# Patient Record
Sex: FEMALE | Race: WHITE | NOT HISPANIC OR LATINO | Employment: OTHER | URBAN - METROPOLITAN AREA
[De-identification: names, ages, dates, MRNs, and addresses within clinical notes are randomized per-mention and may not be internally consistent; named-entity substitution may affect disease eponyms.]

---

## 2017-01-10 ENCOUNTER — OFFICE VISIT (OUTPATIENT)
Dept: SURGERY | Facility: CLINIC | Age: 62
End: 2017-01-10

## 2017-01-10 DIAGNOSIS — Z09 FOLLOW UP: Primary | ICD-10-CM

## 2017-01-10 PROCEDURE — 99024 POSTOP FOLLOW-UP VISIT: CPT | Performed by: SURGERY

## 2017-01-10 NOTE — LETTER
January 10, 2017     Jessica Vázquez MD  [address] [place] [address] [place] [address]    Patient: Susanne Harper   YOB: 1955   Date of Visit: 1/10/2017       Dear Dr. Gurpreet MD:    Thank you for referring Susanne Harper to me for evaluation. Below are the relevant portions of my assessment and plan of care.                   If you have questions, please do not hesitate to call me. I look forward to following Susanne along with you.         Sincerely,        Sumit Logan MD        CC: No Recipients

## 2017-01-10 NOTE — PROGRESS NOTES
PATIENT INFORMATION  Susanne Harper  5 wks 6 days s/p Incisional hernia repair, pt is w/o complaints     - 1955    CHIEF COMPLAINT  Chief Complaint   Patient presents with   • Post-op Follow-up       HISTORY OF PRESENT ILLNESS  HPI    Patient 6 week status post incisional hernia repair.  She is doing well.  She has no complaints.      REVIEW OF SYSTEMS  Review of Systems      ACTIVE PROBLEMS  Patient Active Problem List    Diagnosis   • Incisional hernia, without obstruction or gangrene [K43.2]   • Abdominal pain [R10.9]   • Arthritis [M19.90]   • Crohn's disease [K50.90]   • Gastroesophageal reflux disease [K21.9]   • Hypertension [I10]   • Elevated cholesterol [E78.00]   • Irreducible umbilical hernia [K42.0]   • Sleep disorder [G47.9]         PAST MEDICAL HISTORY  Past Medical History   Diagnosis Date   • Arthritis    • Crohn's disease    • Crohn's disease    • GERD (gastroesophageal reflux disease)    • Glaucoma    • H/O renal failure      post-op   • High cholesterol    • History of transfusion    • Hypertension    • Hypothyroidism    • Respiratory failure, post-operative      on vent x3 days 2015   • Sleep apnea      w/cpap   • Ventral hernia      sched repair   • Vertigo          SURGICAL HISTORY  Past Surgical History   Procedure Laterality Date   • Omentectomy     • Tonsillectomy     • Hysterectomy     • Ganglion cyst excision     • Umbilical hernia repair  2015   • Ventral/incisional hernia repair N/A 2016     Procedure: VENTRAL/INCISIONAL HERNIA REPAIR;  Surgeon: Sumit Logan MD;  Location: [facility];  Service:          FAMILY HISTORY  Family History   Problem Relation Age of Onset   • Arthritis Mother    • Diabetes Maternal Grandmother    • Glaucoma Maternal Grandmother    • Heart disease Maternal Grandfather    • Cancer Maternal Grandfather    • Heart disease Paternal Grandfather          SOCIAL HISTORY  Social History     Occupational History   • Not on file.     Social  History Main Topics   • Smoking status: Never Smoker   • Smokeless tobacco: Never Used   • Alcohol use No   • Drug use: No   • Sexual activity: Defer         CURRENT MEDICATIONS    Current Outpatient Prescriptions:   •  amLODIPine (NORVASC) 5 MG tablet, Take 5 mg by mouth Daily., Disp: , Rfl:   •  azaTHIOprine (IMURAN) 50 MG tablet, TAKE FOUR AND ONE-HALF TABLETS BY MOUTH DAILY, Disp: 405 tablet, Rfl: 0  •  B Complex Vitamins (VITAMIN B COMPLEX) capsule capsule, Take 1 capsule by mouth Daily., Disp: , Rfl:   •  Biotin 10 MG tablet, Take 10 mg by mouth Daily., Disp: , Rfl:   •  Calcium Carb-Cholecalciferol (CALCIUM 600 + D PO), Take 2 tablets by mouth 2 (Two) Times a Day., Disp: , Rfl:   •  celecoxib (CeleBREX) 200 MG capsule, Take 200 mg by mouth Daily., Disp: , Rfl:   •  Cholecalciferol (VITAMIN D3) 2000 UNITS capsule, Take 1,000 Units by mouth Daily., Disp: , Rfl:   •  Cyanocobalamin (B-12) 5000 MCG sublingual tablet, Place 1 tablet under the tongue Daily., Disp: , Rfl:   •  Glucosamine-Chondroitin 750-600 MG tablet, Take 2 tablets by mouth Daily., Disp: , Rfl:   •  levothyroxine (SYNTHROID, LEVOTHROID) 50 MCG tablet, Take 50 mcg by mouth Daily., Disp: , Rfl:   •  lisinopril-hydrochlorothiazide (PRINZIDE,ZESTORETIC) 20-12.5 MG per tablet, Take 1 tablet by mouth Daily., Disp: , Rfl:   •  meclizine (ANTIVERT) 25 MG tablet, Take 25 mg by mouth Every Night., Disp: , Rfl:   •  mometasone (NASONEX) 50 MCG/ACT nasal spray, 2 sprays into each nostril Daily., Disp: , Rfl:   •  Multiple Vitamin (MULTI VITAMIN DAILY PO), Take 1 tablet by mouth Daily., Disp: , Rfl:   •  Omega-3 Fatty Acids (FISH OIL) 1000 MG capsule capsule, Take 500 mg by mouth Daily With Breakfast., Disp: , Rfl:   •  omeprazole (PriLOSEC) 20 MG capsule, Take 20 mg by mouth Daily., Disp: , Rfl:   •  ondansetron (ZOFRAN) 4 MG tablet, Take 1 tablet by mouth Every 6 (Six) Hours As Needed for vomiting., Disp: 20 tablet, Rfl: 0  •  pravastatin (PRAVACHOL) 40 MG  tablet, Take 40 mg by mouth Every Night., Disp: , Rfl:   •  ranitidine (ZANTAC) 150 MG tablet, Take 150 mg by mouth Every Night., Disp: , Rfl:   •  sertraline (ZOLOFT) 50 MG tablet, Take 50 mg by mouth Every Night., Disp: , Rfl:   •  travoprost, DAHLIA free, (TRAVATAN) 0.004 % solution ophthalmic solution, Administer 1 drop to both eyes Every Night., Disp: , Rfl:   •  vitamin C (ASCORBIC ACID) 250 MG tablet, Take 1,000 mg by mouth Daily., Disp: , Rfl:     ALLERGIES  Codeine and Penicillins    VITALS  There were no vitals filed for this visit.    LAST RESULTS   Admission on 11/30/2016, Discharged on 12/04/2016   Component Date Value Ref Range Status   • Case Report 11/30/2016    Final                    Value:Surgical Pathology Report                         Case: [id number]                                  Authorizing Provider:  Sumit Logan MD   Collected:           11/30/2016 04:00 PM          Ordering Location:     [facility]   Received:            11/30/2016 05:20 PM                                 OR                                                                           Pathologist:           Marni Montgomery MD                                                     Specimen:    Hernia, Sac                                                                               • Final Diagnosis 11/30/2016    Final                    Value:This result contains rich text formatting which cannot be displayed here.   • Glucose 12/01/2016 119* 65 - 99 mg/dL Final   • BUN 12/01/2016 17  8 - 23 mg/dL Final   • Creatinine 12/01/2016 0.84  0.57 - 1.00 mg/dL Final   • Sodium 12/01/2016 139  136 - 145 mmol/L Final   • Potassium 12/01/2016 4.2  3.5 - 5.2 mmol/L Final   • Chloride 12/01/2016 101  98 - 107 mmol/L Final   • CO2 12/01/2016 25.3  22.0 - 29.0 mmol/L Final   • Calcium 12/01/2016 8.9  8.8 - 10.5 mg/dL Final   • eGFR Non African Amer 12/01/2016 69  >60 mL/min/1.73 Final   • BUN/Creatinine Ratio  12/01/2016 20.2  7.0 - 25.0 Final   • Anion Gap 12/01/2016 12.7  mmol/L Final   • WBC 12/01/2016 11.39* 4.80 - 10.80 10*3/mm3 Final   • RBC 12/01/2016 4.39  4.20 - 5.40 10*6/mm3 Final   • Hemoglobin 12/01/2016 13.5  12.0 - 16.0 g/dL Final   • Hematocrit 12/01/2016 41.7  37.0 - 47.0 % Final   • MCV 12/01/2016 95.0  81.0 - 99.0 fL Final   • MCH 12/01/2016 30.8  27.0 - 31.0 pg Final   • MCHC 12/01/2016 32.4  31.0 - 37.0 g/dL Final   • RDW 12/01/2016 13.3  11.5 - 14.5 % Final   • RDW-SD 12/01/2016 46.8  37.0 - 54.0 fl Final   • MPV 12/01/2016 10.7* 7.4 - 10.4 fL Final   • Platelets 12/01/2016 286  140 - 500 10*3/mm3 Final   • Neutrophil % 12/01/2016 73.8* 45.0 - 70.0 % Final   • Lymphocyte % 12/01/2016 12.2* 20.0 - 45.0 % Final   • Monocyte % 12/01/2016 13.1* 3.0 - 8.0 % Final   • Eosinophil % 12/01/2016 0.1  0.0 - 4.0 % Final   • Basophil % 12/01/2016 0.4  0.0 - 2.0 % Final   • Immature Grans % 12/01/2016 0.4  0.0 - 0.5 % Final   • Neutrophils, Absolute 12/01/2016 8.40* 1.50 - 8.30 10*3/mm3 Final   • Lymphocytes, Absolute 12/01/2016 1.39  0.60 - 4.80 10*3/mm3 Final   • Monocytes, Absolute 12/01/2016 1.49* 0.00 - 1.00 10*3/mm3 Final   • Eosinophils, Absolute 12/01/2016 0.01* 0.10 - 0.30 10*3/mm3 Final   • Basophils, Absolute 12/01/2016 0.05  0.00 - 0.20 10*3/mm3 Final   • Immature Grans, Absolute 12/01/2016 0.05* 0.00 - 0.03 10*3/mm3 Final   • nRBC 12/01/2016 0.0  0.0 - 0.0 /100 WBC Final   • Hemoglobin A1C 12/01/2016 5.10  4.80 - 5.60 % Final   • TSH 12/01/2016 1.480  0.270 - 4.200 mIU/mL Final   • Hemoglobin 12/03/2016 11.1* 12.0 - 16.0 g/dL Final   • Hematocrit 12/03/2016 34.6* 37.0 - 47.0 % Final   • Glucose 12/04/2016 104* 65 - 99 mg/dL Final   • BUN 12/04/2016 9  8 - 23 mg/dL Final   • Creatinine 12/04/2016 0.74  0.57 - 1.00 mg/dL Final   • Sodium 12/04/2016 135* 136 - 145 mmol/L Final   • Potassium 12/04/2016 3.7  3.5 - 5.2 mmol/L Final   • Chloride 12/04/2016 97* 98 - 107 mmol/L Final   • CO2 12/04/2016 26.2   22.0 - 29.0 mmol/L Final   • Calcium 12/04/2016 8.4* 8.8 - 10.5 mg/dL Final   • eGFR Non African Amer 12/04/2016 80  >60 mL/min/1.73 Final   • BUN/Creatinine Ratio 12/04/2016 12.2  7.0 - 25.0 Final   • Anion Gap 12/04/2016 11.8  mmol/L Final   • WBC 12/04/2016 6.86  4.80 - 10.80 10*3/mm3 Final   • RBC 12/04/2016 3.50* 4.20 - 5.40 10*6/mm3 Final   • Hemoglobin 12/04/2016 10.8* 12.0 - 16.0 g/dL Final   • Hematocrit 12/04/2016 34.4* 37.0 - 47.0 % Final   • MCV 12/04/2016 98.3  81.0 - 99.0 fL Final   • MCH 12/04/2016 30.9  27.0 - 31.0 pg Final   • MCHC 12/04/2016 31.4  31.0 - 37.0 g/dL Final   • RDW 12/04/2016 12.5  11.5 - 14.5 % Final   • RDW-SD 12/04/2016 45.0  37.0 - 54.0 fl Final   • MPV 12/04/2016 11.6* 7.4 - 10.4 fL Final   • Platelets 12/04/2016 210  140 - 500 10*3/mm3 Final   • Neutrophil % 12/04/2016 43.3* 45.0 - 70.0 % Final   • Lymphocyte % 12/04/2016 34.8  20.0 - 45.0 % Final   • Monocyte % 12/04/2016 15.9* 3.0 - 8.0 % Final   • Eosinophil % 12/04/2016 4.5* 0.0 - 4.0 % Final   • Basophil % 12/04/2016 0.9  0.0 - 2.0 % Final   • Immature Grans % 12/04/2016 0.6* 0.0 - 0.5 % Final   • Neutrophils, Absolute 12/04/2016 2.97  1.50 - 8.30 10*3/mm3 Final   • Lymphocytes, Absolute 12/04/2016 2.39  0.60 - 4.80 10*3/mm3 Final   • Monocytes, Absolute 12/04/2016 1.09* 0.00 - 1.00 10*3/mm3 Final   • Eosinophils, Absolute 12/04/2016 0.31* 0.10 - 0.30 10*3/mm3 Final   • Basophils, Absolute 12/04/2016 0.06  0.00 - 0.20 10*3/mm3 Final   • Immature Grans, Absolute 12/04/2016 0.04* 0.00 - 0.03 10*3/mm3 Final   • nRBC 12/04/2016 0.0  0.0 - 0.0 /100 WBC Final     No results found.    PHYSICAL EXAM  Physical Exam     Abdominal exam is benign.  Incision is well-healed.  No evidence of recurrent incisional hernia.    ASSESSMENT    Patient is doing well status post incisional hernia repair.      PLAN    May increase activity as tolerated.  Follow-up one month.

## 2017-01-10 NOTE — MR AVS SNAPSHOT
Susanne Harper   1/10/2017 3:00 PM   Office Visit    Dept Phone:  [phone]   Encounter #:  [id number]    Provider:  Sumit Logan MD   Department:  [facility] GENERAL SURGERY                Your Full Care Plan              Your Updated Medication List          This list is accurate as of: 1/10/17  3:26 PM.  Always use your most recent med list.                amLODIPine 5 MG tablet   Commonly known as:  NORVASC       azaTHIOprine 50 MG tablet   Commonly known as:  IMURAN   TAKE FOUR AND ONE-HALF TABLETS BY MOUTH DAILY       B-12 5000 MCG sublingual tablet       Biotin 10 MG tablet       CALCIUM 600 + D PO       celecoxib 200 MG capsule   Commonly known as:  CeleBREX       fish oil 1000 MG capsule capsule       Glucosamine-Chondroitin 750-600 MG tablet       levothyroxine 50 MCG tablet   Commonly known as:  SYNTHROID, LEVOTHROID       lisinopril-hydrochlorothiazide 20-12.5 MG per tablet   Commonly known as:  PRINZIDE,ZESTORETIC       meclizine 25 MG tablet   Commonly known as:  ANTIVERT       mometasone 50 MCG/ACT nasal spray   Commonly known as:  NASONEX       MULTI VITAMIN DAILY PO       omeprazole 20 MG capsule   Commonly known as:  priLOSEC       ondansetron 4 MG tablet   Commonly known as:  ZOFRAN   Take 1 tablet by mouth Every 6 (Six) Hours As Needed for vomiting.       pravastatin 40 MG tablet   Commonly known as:  PRAVACHOL       raNITIdine 150 MG tablet   Commonly known as:  ZANTAC       sertraline 50 MG tablet   Commonly known as:  ZOLOFT       travoprost (BAK free) 0.004 % solution ophthalmic solution   Commonly known as:  TRAVATAN       vitamin b complex capsule capsule       vitamin C 250 MG tablet   Commonly known as:  ASCORBIC ACID       Vitamin D3 2000 UNITS capsule               You Were Diagnosed With        Codes Comments    Follow up    -  Primary ICD-10-CM: Z09  ICD-9-CM: V67.9       Instructions     None    Patient Instructions History         Upcoming Appointments     Visit Type Date Time Department    POST-OP 1/10/2017  3:00 PM [facility]    OFFICE VISIT 2/9/2017  3:00 PM [facility]      [facility] Signup     Our records indicate that you have an active [facility] [employer] account.    You can view your After Visit Summary by going to [employer] and logging in with your [employer] username and password.  If you don't have a [employer] username and password but a parent or guardian has access to your record, the parent or guardian should login with their own [employer] username and password and access your record to view the After Visit Summary.    If you have questions, you can email [email or web address] or call [phone] to talk to our [employer] staff.  Remember, [employer] is NOT to be used for urgent needs.  For medical emergencies, dial 911.               Other Info from Your Visit           Your Appointments     Feb 09, 2017  3:00 PM EST   Office Visit with Sumit Logan MD   [facility] GENERAL SURGERY (--)    [address]  [place] [address] [phone]           Arrive 15 minutes prior to appointment.              Allergies     Codeine  Nausea And Vomiting    Penicillins  Hives      Reason for Visit     Post-op Follow-up           Vital Signs     Smoking Status                   Never Smoker           Problems and Diagnoses Noted     Follow up    -  Primary

## 2017-01-30 RX ORDER — AZATHIOPRINE 50 MG/1
TABLET ORAL
Qty: 405 TABLET | Refills: 6 | Status: SHIPPED | OUTPATIENT
Start: 2017-01-30 | End: 2018-07-10 | Stop reason: SDUPTHER

## 2017-02-09 ENCOUNTER — OFFICE VISIT (OUTPATIENT)
Dept: SURGERY | Facility: CLINIC | Age: 62
End: 2017-02-09

## 2017-02-09 DIAGNOSIS — Z09 FOLLOW UP: Primary | ICD-10-CM

## 2017-02-09 PROCEDURE — 99024 POSTOP FOLLOW-UP VISIT: CPT | Performed by: SURGERY

## 2017-02-09 NOTE — PROGRESS NOTES
PATIENT INFORMATION  Susanne Harper  10 WKS 1 DAY S/P VENTRAL/INCISIONAL HERNIA REPAIR, PT IS W/O COMPLAINTS     - 1955    CHIEF COMPLAINT  Chief Complaint   Patient presents with   • Post-op Follow-up       HISTORY OF PRESENT ILLNESS  HPI    Patient seen 10 weeks status post repair incisional hernia.  Doing well.  She has no complaints.      REVIEW OF SYSTEMS  Review of Systems      ACTIVE PROBLEMS  Patient Active Problem List    Diagnosis   • Incisional hernia, without obstruction or gangrene [K43.2]   • Abdominal pain [R10.9]   • Arthritis [M19.90]   • Crohn's disease [K50.90]   • Gastroesophageal reflux disease [K21.9]   • Hypertension [I10]   • Elevated cholesterol [E78.00]   • Irreducible umbilical hernia [K42.0]   • Sleep disorder [G47.9]         PAST MEDICAL HISTORY  Past Medical History   Diagnosis Date   • Arthritis    • Crohn's disease    • Crohn's disease    • GERD (gastroesophageal reflux disease)    • Glaucoma    • H/O renal failure      post-op   • High cholesterol    • History of transfusion    • Hypertension    • Hypothyroidism    • Respiratory failure, post-operative      on vent x3 days 2015   • Sleep apnea      w/cpap   • Ventral hernia      sched repair   • Vertigo          SURGICAL HISTORY  Past Surgical History   Procedure Laterality Date   • Omentectomy     • Tonsillectomy     • Hysterectomy     • Ganglion cyst excision     • Umbilical hernia repair  2015   • Ventral/incisional hernia repair N/A 2016     Procedure: VENTRAL/INCISIONAL HERNIA REPAIR;  Surgeon: Sumit Logan MD;  Location: [facility];  Service:          FAMILY HISTORY  Family History   Problem Relation Age of Onset   • Arthritis Mother    • Diabetes Maternal Grandmother    • Glaucoma Maternal Grandmother    • Heart disease Maternal Grandfather    • Cancer Maternal Grandfather    • Heart disease Paternal Grandfather          SOCIAL HISTORY  Social History     Occupational History   • Not on file.      Social History Main Topics   • Smoking status: Never Smoker   • Smokeless tobacco: Never Used   • Alcohol use No   • Drug use: No   • Sexual activity: Defer         CURRENT MEDICATIONS    Current Outpatient Prescriptions:   •  amLODIPine (NORVASC) 5 MG tablet, Take 5 mg by mouth Daily., Disp: , Rfl:   •  azaTHIOprine (IMURAN) 50 MG tablet, TAKE FOUR AND ONE HALF (4 1/2) TABLET BY MOUTH DAILY, Disp: 405 tablet, Rfl: 6  •  B Complex Vitamins (VITAMIN B COMPLEX) capsule capsule, Take 1 capsule by mouth Daily., Disp: , Rfl:   •  Biotin 10 MG tablet, Take 10 mg by mouth Daily., Disp: , Rfl:   •  Calcium Carb-Cholecalciferol (CALCIUM 600 + D PO), Take 2 tablets by mouth 2 (Two) Times a Day., Disp: , Rfl:   •  celecoxib (CeleBREX) 200 MG capsule, Take 200 mg by mouth Daily., Disp: , Rfl:   •  Cholecalciferol (VITAMIN D3) 2000 UNITS capsule, Take 1,000 Units by mouth Daily., Disp: , Rfl:   •  Cyanocobalamin (B-12) 5000 MCG sublingual tablet, Place 1 tablet under the tongue Daily., Disp: , Rfl:   •  Glucosamine-Chondroitin 750-600 MG tablet, Take 2 tablets by mouth Daily., Disp: , Rfl:   •  levothyroxine (SYNTHROID, LEVOTHROID) 50 MCG tablet, Take 50 mcg by mouth Daily., Disp: , Rfl:   •  lisinopril-hydrochlorothiazide (PRINZIDE,ZESTORETIC) 20-12.5 MG per tablet, Take 1 tablet by mouth Daily., Disp: , Rfl:   •  meclizine (ANTIVERT) 25 MG tablet, Take 25 mg by mouth Every Night., Disp: , Rfl:   •  mometasone (NASONEX) 50 MCG/ACT nasal spray, 2 sprays into each nostril Daily., Disp: , Rfl:   •  Multiple Vitamin (MULTI VITAMIN DAILY PO), Take 1 tablet by mouth Daily., Disp: , Rfl:   •  Omega-3 Fatty Acids (FISH OIL) 1000 MG capsule capsule, Take 500 mg by mouth Daily With Breakfast., Disp: , Rfl:   •  omeprazole (PriLOSEC) 20 MG capsule, Take 20 mg by mouth Daily., Disp: , Rfl:   •  ondansetron (ZOFRAN) 4 MG tablet, Take 1 tablet by mouth Every 6 (Six) Hours As Needed for vomiting., Disp: 20 tablet, Rfl: 0  •  pravastatin  (PRAVACHOL) 40 MG tablet, Take 40 mg by mouth Every Night., Disp: , Rfl:   •  ranitidine (ZANTAC) 150 MG tablet, Take 150 mg by mouth Every Night., Disp: , Rfl:   •  sertraline (ZOLOFT) 50 MG tablet, Take 50 mg by mouth Every Night., Disp: , Rfl:   •  travoprost, DAHLIA free, (TRAVATAN) 0.004 % solution ophthalmic solution, Administer 1 drop to both eyes Every Night., Disp: , Rfl:   •  vitamin C (ASCORBIC ACID) 250 MG tablet, Take 1,000 mg by mouth Daily., Disp: , Rfl:     ALLERGIES  Codeine and Penicillins    VITALS  There were no vitals filed for this visit.    LAST RESULTS   Admission on 11/30/2016, Discharged on 12/04/2016   Component Date Value Ref Range Status   • Case Report 11/30/2016    Final                    Value:Surgical Pathology Report                         Case: [id number]                                  Authorizing Provider:  Sumit Logan MD   Collected:           11/30/2016 04:00 PM          Ordering Location:     [facility]   Received:            11/30/2016 05:20 PM                                 OR                                                                           Pathologist:           Marni Montgomery MD                                                     Specimen:    Hernia, Sac                                                                               • Final Diagnosis 11/30/2016    Final                    Value:This result contains rich text formatting which cannot be displayed here.   • Glucose 12/01/2016 119* 65 - 99 mg/dL Final   • BUN 12/01/2016 17  8 - 23 mg/dL Final   • Creatinine 12/01/2016 0.84  0.57 - 1.00 mg/dL Final   • Sodium 12/01/2016 139  136 - 145 mmol/L Final   • Potassium 12/01/2016 4.2  3.5 - 5.2 mmol/L Final   • Chloride 12/01/2016 101  98 - 107 mmol/L Final   • CO2 12/01/2016 25.3  22.0 - 29.0 mmol/L Final   • Calcium 12/01/2016 8.9  8.8 - 10.5 mg/dL Final   • eGFR Non African Amer 12/01/2016 69  >60 mL/min/1.73 Final   •  BUN/Creatinine Ratio 12/01/2016 20.2  7.0 - 25.0 Final   • Anion Gap 12/01/2016 12.7  mmol/L Final   • WBC 12/01/2016 11.39* 4.80 - 10.80 10*3/mm3 Final   • RBC 12/01/2016 4.39  4.20 - 5.40 10*6/mm3 Final   • Hemoglobin 12/01/2016 13.5  12.0 - 16.0 g/dL Final   • Hematocrit 12/01/2016 41.7  37.0 - 47.0 % Final   • MCV 12/01/2016 95.0  81.0 - 99.0 fL Final   • MCH 12/01/2016 30.8  27.0 - 31.0 pg Final   • MCHC 12/01/2016 32.4  31.0 - 37.0 g/dL Final   • RDW 12/01/2016 13.3  11.5 - 14.5 % Final   • RDW-SD 12/01/2016 46.8  37.0 - 54.0 fl Final   • MPV 12/01/2016 10.7* 7.4 - 10.4 fL Final   • Platelets 12/01/2016 286  140 - 500 10*3/mm3 Final   • Neutrophil % 12/01/2016 73.8* 45.0 - 70.0 % Final   • Lymphocyte % 12/01/2016 12.2* 20.0 - 45.0 % Final   • Monocyte % 12/01/2016 13.1* 3.0 - 8.0 % Final   • Eosinophil % 12/01/2016 0.1  0.0 - 4.0 % Final   • Basophil % 12/01/2016 0.4  0.0 - 2.0 % Final   • Immature Grans % 12/01/2016 0.4  0.0 - 0.5 % Final   • Neutrophils, Absolute 12/01/2016 8.40* 1.50 - 8.30 10*3/mm3 Final   • Lymphocytes, Absolute 12/01/2016 1.39  0.60 - 4.80 10*3/mm3 Final   • Monocytes, Absolute 12/01/2016 1.49* 0.00 - 1.00 10*3/mm3 Final   • Eosinophils, Absolute 12/01/2016 0.01* 0.10 - 0.30 10*3/mm3 Final   • Basophils, Absolute 12/01/2016 0.05  0.00 - 0.20 10*3/mm3 Final   • Immature Grans, Absolute 12/01/2016 0.05* 0.00 - 0.03 10*3/mm3 Final   • nRBC 12/01/2016 0.0  0.0 - 0.0 /100 WBC Final   • Hemoglobin A1C 12/01/2016 5.10  4.80 - 5.60 % Final   • TSH 12/01/2016 1.480  0.270 - 4.200 mIU/mL Final   • Hemoglobin 12/03/2016 11.1* 12.0 - 16.0 g/dL Final   • Hematocrit 12/03/2016 34.6* 37.0 - 47.0 % Final   • Glucose 12/04/2016 104* 65 - 99 mg/dL Final   • BUN 12/04/2016 9  8 - 23 mg/dL Final   • Creatinine 12/04/2016 0.74  0.57 - 1.00 mg/dL Final   • Sodium 12/04/2016 135* 136 - 145 mmol/L Final   • Potassium 12/04/2016 3.7  3.5 - 5.2 mmol/L Final   • Chloride 12/04/2016 97* 98 - 107 mmol/L Final   •  CO2 12/04/2016 26.2  22.0 - 29.0 mmol/L Final   • Calcium 12/04/2016 8.4* 8.8 - 10.5 mg/dL Final   • eGFR Non African Amer 12/04/2016 80  >60 mL/min/1.73 Final   • BUN/Creatinine Ratio 12/04/2016 12.2  7.0 - 25.0 Final   • Anion Gap 12/04/2016 11.8  mmol/L Final   • WBC 12/04/2016 6.86  4.80 - 10.80 10*3/mm3 Final   • RBC 12/04/2016 3.50* 4.20 - 5.40 10*6/mm3 Final   • Hemoglobin 12/04/2016 10.8* 12.0 - 16.0 g/dL Final   • Hematocrit 12/04/2016 34.4* 37.0 - 47.0 % Final   • MCV 12/04/2016 98.3  81.0 - 99.0 fL Final   • MCH 12/04/2016 30.9  27.0 - 31.0 pg Final   • MCHC 12/04/2016 31.4  31.0 - 37.0 g/dL Final   • RDW 12/04/2016 12.5  11.5 - 14.5 % Final   • RDW-SD 12/04/2016 45.0  37.0 - 54.0 fl Final   • MPV 12/04/2016 11.6* 7.4 - 10.4 fL Final   • Platelets 12/04/2016 210  140 - 500 10*3/mm3 Final   • Neutrophil % 12/04/2016 43.3* 45.0 - 70.0 % Final   • Lymphocyte % 12/04/2016 34.8  20.0 - 45.0 % Final   • Monocyte % 12/04/2016 15.9* 3.0 - 8.0 % Final   • Eosinophil % 12/04/2016 4.5* 0.0 - 4.0 % Final   • Basophil % 12/04/2016 0.9  0.0 - 2.0 % Final   • Immature Grans % 12/04/2016 0.6* 0.0 - 0.5 % Final   • Neutrophils, Absolute 12/04/2016 2.97  1.50 - 8.30 10*3/mm3 Final   • Lymphocytes, Absolute 12/04/2016 2.39  0.60 - 4.80 10*3/mm3 Final   • Monocytes, Absolute 12/04/2016 1.09* 0.00 - 1.00 10*3/mm3 Final   • Eosinophils, Absolute 12/04/2016 0.31* 0.10 - 0.30 10*3/mm3 Final   • Basophils, Absolute 12/04/2016 0.06  0.00 - 0.20 10*3/mm3 Final   • Immature Grans, Absolute 12/04/2016 0.04* 0.00 - 0.03 10*3/mm3 Final   • nRBC 12/04/2016 0.0  0.0 - 0.0 /100 WBC Final     No results found.    PHYSICAL EXAM  Physical Exam     Abdominal exam is benign today.  No tenderness.  No evidence of recurrent herniation.  Incision well-healed.    ASSESSMENT    Doing well status post incisional hernia repair.      PLAN      May resume normal activities as tolerated.  Follow-up as needed.

## 2017-04-12 ENCOUNTER — TRANSCRIBE ORDERS (OUTPATIENT)
Dept: ADMINISTRATIVE | Facility: HOSPITAL | Age: 62
End: 2017-04-12

## 2017-04-12 DIAGNOSIS — Z13.9 SCREENING: Primary | ICD-10-CM

## 2017-05-02 ENCOUNTER — HOSPITAL ENCOUNTER (OUTPATIENT)
Dept: SLEEP MEDICINE | Facility: HOSPITAL | Age: 62
Discharge: HOME | End: 2017-05-02
Admitting: INTERNAL MEDICINE

## 2017-05-02 DIAGNOSIS — G47.33 OSA (OBSTRUCTIVE SLEEP APNEA): Primary | ICD-10-CM

## 2017-05-02 PROCEDURE — G0463 HOSPITAL OUTPT CLINIC VISIT: HCPCS

## 2017-05-15 ENCOUNTER — HOSPITAL ENCOUNTER (OUTPATIENT)
Dept: MAMMOGRAPHY | Facility: HOSPITAL | Age: 62
Discharge: HOME | End: 2017-05-15
Admitting: PHYSICIAN ASSISTANT

## 2017-05-15 DIAGNOSIS — Z13.9 SCREENING: ICD-10-CM

## 2017-05-15 PROCEDURE — 77063 BREAST TOMOSYNTHESIS BI: CPT

## 2017-05-15 PROCEDURE — G0202 SCR MAMMO BI INCL CAD: HCPCS

## 2017-05-19 ENCOUNTER — HOSPITAL ENCOUNTER (OUTPATIENT)
Dept: SLEEP MEDICINE | Facility: HOSPITAL | Age: 62
End: 2017-05-19

## 2017-05-22 ENCOUNTER — HOSPITAL ENCOUNTER (OUTPATIENT)
Dept: SLEEP MEDICINE | Facility: HOSPITAL | Age: 62
Discharge: HOME | End: 2017-05-22
Attending: INTERNAL MEDICINE | Admitting: INTERNAL MEDICINE

## 2017-05-22 DIAGNOSIS — G47.33 OSA (OBSTRUCTIVE SLEEP APNEA): ICD-10-CM

## 2017-05-22 PROCEDURE — 95810 POLYSOM 6/> YRS 4/> PARAM: CPT

## 2018-04-13 RX ORDER — AZATHIOPRINE 50 MG/1
TABLET ORAL
Qty: 405 TABLET | Refills: 5 | OUTPATIENT
Start: 2018-04-13

## 2018-05-03 ENCOUNTER — TRANSCRIBE ORDERS (OUTPATIENT)
Dept: ADMINISTRATIVE | Facility: HOSPITAL | Age: 63
End: 2018-05-03

## 2018-05-03 DIAGNOSIS — Z78.0 POSTMENOPAUSAL STATUS: ICD-10-CM

## 2018-05-03 DIAGNOSIS — Z12.39 SCREENING BREAST EXAMINATION: Primary | ICD-10-CM

## 2018-05-15 ENCOUNTER — OFFICE VISIT (OUTPATIENT)
Dept: GASTROENTEROLOGY | Facility: CLINIC | Age: 63
End: 2018-05-15

## 2018-05-15 VITALS
SYSTOLIC BLOOD PRESSURE: 138 MMHG | HEIGHT: 62 IN | DIASTOLIC BLOOD PRESSURE: 90 MMHG | WEIGHT: 293 LBS | BODY MASS INDEX: 53.92 KG/M2

## 2018-05-15 DIAGNOSIS — K50.90 CROHN'S DISEASE WITHOUT COMPLICATION, UNSPECIFIED GASTROINTESTINAL TRACT LOCATION (HCC): Primary | ICD-10-CM

## 2018-05-15 PROCEDURE — 99212 OFFICE O/P EST SF 10 MIN: CPT | Performed by: INTERNAL MEDICINE

## 2018-05-15 NOTE — PROGRESS NOTES
PATIENT INFORMATION  Susanne Harper       - 1955    CHIEF COMPLAINT  Chief Complaint   Patient presents with   • Crohn's Disease       HISTORY OF PRESENT ILLNESS  Crohn's Disease   Associated symptoms include arthralgias and joint swelling.     She is here today in follow up and is doing well. She is on imuran. bm are daily 1-2 times. No blood. Recent labs with ast/alt of 28/26.  No abdominal pain.      REVIEW OF SYSTEMS  Review of Systems   Respiratory: Positive for apnea.    Musculoskeletal: Positive for arthralgias, back pain, gait problem and joint swelling.   All other systems reviewed and are negative.        ACTIVE PROBLEMS  Patient Active Problem List    Diagnosis   • Incisional hernia, without obstruction or gangrene [K43.2]   • Abdominal pain [R10.9]   • Arthritis [M19.90]   • Crohn's disease [K50.90]   • Gastroesophageal reflux disease [K21.9]   • Hypertension [I10]   • Elevated cholesterol [E78.00]   • Irreducible umbilical hernia [K42.0]   • Sleep disorder [G47.9]         PAST MEDICAL HISTORY  Past Medical History:   Diagnosis Date   • Arthritis    • Crohn's disease    • Crohn's disease    • GERD (gastroesophageal reflux disease)    • Glaucoma    • H/O renal failure     post-op   • High cholesterol    • History of transfusion    • Hypertension    • Hypothyroidism    • Respiratory failure, post-operative     on vent x3 days 2015   • Sleep apnea     w/cpap   • Ventral hernia     sched repair   • Vertigo          SURGICAL HISTORY  Past Surgical History:   Procedure Laterality Date   • GANGLION CYST EXCISION     • HYSTERECTOMY     • OMENTECTOMY     • TONSILLECTOMY     • UMBILICAL HERNIA REPAIR  2015   • VENTRAL/INCISIONAL HERNIA REPAIR N/A 2016    Procedure: VENTRAL/INCISIONAL HERNIA REPAIR;  Surgeon: Sumit Logan MD;  Location: [facility];  Service:          FAMILY HISTORY  Family History   Problem Relation Age of Onset   • Arthritis Mother    • Diabetes Maternal Grandmother     • Glaucoma Maternal Grandmother    • Heart disease Maternal Grandfather    • Cancer Maternal Grandfather    • Heart disease Paternal Grandfather          SOCIAL HISTORY  Social History     Occupational History   • Not on file.     Social History Main Topics   • Smoking status: Never Smoker   • Smokeless tobacco: Never Used   • Alcohol use No   • Drug use: No   • Sexual activity: Defer         CURRENT MEDICATIONS    Current Outpatient Prescriptions:   •  amLODIPine (NORVASC) 5 MG tablet, Take 5 mg by mouth Daily., Disp: , Rfl:   •  azaTHIOprine (IMURAN) 50 MG tablet, TAKE FOUR AND ONE HALF (4 1/2) TABLET BY MOUTH DAILY, Disp: 405 tablet, Rfl: 6  •  B Complex Vitamins (VITAMIN B COMPLEX) capsule capsule, Take 1 capsule by mouth Daily., Disp: , Rfl:   •  celecoxib (CeleBREX) 200 MG capsule, Take 200 mg by mouth Daily., Disp: , Rfl:   •  Cholecalciferol (VITAMIN D3) 2000 UNITS capsule, Take 1,000 Units by mouth Daily., Disp: , Rfl:   •  lisinopril-hydrochlorothiazide (PRINZIDE,ZESTORETIC) 20-12.5 MG per tablet, Take 1 tablet by mouth Daily., Disp: , Rfl:   •  mometasone (NASONEX) 50 MCG/ACT nasal spray, 2 sprays into each nostril Daily., Disp: , Rfl:   •  Multiple Vitamin (MULTI VITAMIN DAILY PO), Take 1 tablet by mouth Daily., Disp: , Rfl:   •  Omega-3 Fatty Acids (FISH OIL) 1000 MG capsule capsule, Take 500 mg by mouth Daily With Breakfast., Disp: , Rfl:   •  omeprazole (PriLOSEC) 20 MG capsule, Take 20 mg by mouth Daily., Disp: , Rfl:   •  pravastatin (PRAVACHOL) 40 MG tablet, Take 40 mg by mouth Every Night., Disp: , Rfl:   •  ranitidine (ZANTAC) 150 MG tablet, Take 150 mg by mouth Every Night., Disp: , Rfl:   •  travoprost, DAHLIA free, (TRAVATAN) 0.004 % solution ophthalmic solution, Administer 1 drop to both eyes Every Night., Disp: , Rfl:   •  vitamin C (ASCORBIC ACID) 250 MG tablet, Take 1,000 mg by mouth Daily., Disp: , Rfl:   •  Biotin 10 MG tablet, Take 10 mg by mouth Daily., Disp: , Rfl:   •  Calcium  "Carb-Cholecalciferol (CALCIUM 600 + D PO), Take 2 tablets by mouth 2 (Two) Times a Day., Disp: , Rfl:   •  Cyanocobalamin (B-12) 5000 MCG sublingual tablet, Place 1 tablet under the tongue Daily., Disp: , Rfl:   •  Glucosamine-Chondroitin 750-600 MG tablet, Take 2 tablets by mouth Daily., Disp: , Rfl:   •  levothyroxine (SYNTHROID, LEVOTHROID) 50 MCG tablet, Take 50 mcg by mouth Daily., Disp: , Rfl:   •  meclizine (ANTIVERT) 25 MG tablet, Take 25 mg by mouth Every Night., Disp: , Rfl:   •  ondansetron (ZOFRAN) 4 MG tablet, Take 1 tablet by mouth Every 6 (Six) Hours As Needed for vomiting., Disp: 20 tablet, Rfl: 0  •  sertraline (ZOLOFT) 50 MG tablet, Take 50 mg by mouth Every Night., Disp: , Rfl:     ALLERGIES  Codeine and Penicillins    VITALS  Vitals:    05/15/18 1524   BP: 138/90   Weight: (!) 153 kg (337 lb)   Height: 157.5 cm (62.01\")       LAST RESULTS   Admission on 11/30/2016, Discharged on 12/04/2016   Component Date Value Ref Range Status   • Case Report 12/03/2016    Final                    Value:Surgical Pathology Report                         Case: [id number]                                  Authorizing Provider:  Sumit Logan MD   Collected:           11/30/2016 04:00 PM          Ordering Location:     [facility]   Received:            11/30/2016 05:20 PM                                 OR                                                                           Pathologist:           Marni Montgomery MD                                                     Specimen:    Hernia, Sac                                                                               • Final Diagnosis 12/03/2016    Final                    Value:This result contains rich text formatting which cannot be displayed here.   • Glucose 12/01/2016 119* 65 - 99 mg/dL Final   • BUN 12/01/2016 17  8 - 23 mg/dL Final   • Creatinine 12/01/2016 0.84  0.57 - 1.00 mg/dL Final   • Sodium 12/01/2016 139  136 - 145 " mmol/L Final   • Potassium 12/01/2016 4.2  3.5 - 5.2 mmol/L Final   • Chloride 12/01/2016 101  98 - 107 mmol/L Final   • CO2 12/01/2016 25.3  22.0 - 29.0 mmol/L Final   • Calcium 12/01/2016 8.9  8.8 - 10.5 mg/dL Final   • eGFR Non African Amer 12/01/2016 69  >60 mL/min/1.73 Final   • BUN/Creatinine Ratio 12/01/2016 20.2  7.0 - 25.0 Final   • Anion Gap 12/01/2016 12.7  mmol/L Final   • WBC 12/01/2016 11.39* 4.80 - 10.80 10*3/mm3 Final   • RBC 12/01/2016 4.39  4.20 - 5.40 10*6/mm3 Final   • Hemoglobin 12/01/2016 13.5  12.0 - 16.0 g/dL Final   • Hematocrit 12/01/2016 41.7  37.0 - 47.0 % Final   • MCV 12/01/2016 95.0  81.0 - 99.0 fL Final   • MCH 12/01/2016 30.8  27.0 - 31.0 pg Final   • MCHC 12/01/2016 32.4  31.0 - 37.0 g/dL Final   • RDW 12/01/2016 13.3  11.5 - 14.5 % Final   • RDW-SD 12/01/2016 46.8  37.0 - 54.0 fl Final   • MPV 12/01/2016 10.7* 7.4 - 10.4 fL Final   • Platelets 12/01/2016 286  140 - 500 10*3/mm3 Final   • Neutrophil % 12/01/2016 73.8* 45.0 - 70.0 % Final   • Lymphocyte % 12/01/2016 12.2* 20.0 - 45.0 % Final   • Monocyte % 12/01/2016 13.1* 3.0 - 8.0 % Final   • Eosinophil % 12/01/2016 0.1  0.0 - 4.0 % Final   • Basophil % 12/01/2016 0.4  0.0 - 2.0 % Final   • Immature Grans % 12/01/2016 0.4  0.0 - 0.5 % Final   • Neutrophils, Absolute 12/01/2016 8.40* 1.50 - 8.30 10*3/mm3 Final   • Lymphocytes, Absolute 12/01/2016 1.39  0.60 - 4.80 10*3/mm3 Final   • Monocytes, Absolute 12/01/2016 1.49* 0.00 - 1.00 10*3/mm3 Final   • Eosinophils, Absolute 12/01/2016 0.01* 0.10 - 0.30 10*3/mm3 Final   • Basophils, Absolute 12/01/2016 0.05  0.00 - 0.20 10*3/mm3 Final   • Immature Grans, Absolute 12/01/2016 0.05* 0.00 - 0.03 10*3/mm3 Final   • nRBC 12/01/2016 0.0  0.0 - 0.0 /100 WBC Final   • Hemoglobin A1C 12/01/2016 5.10  4.80 - 5.60 % Final   • TSH 12/01/2016 1.480  0.270 - 4.200 mIU/mL Final   • Hemoglobin 12/03/2016 11.1* 12.0 - 16.0 g/dL Final   • Hematocrit 12/03/2016 34.6* 37.0 - 47.0 % Final   • Glucose  12/04/2016 104* 65 - 99 mg/dL Final   • BUN 12/04/2016 9  8 - 23 mg/dL Final   • Creatinine 12/04/2016 0.74  0.57 - 1.00 mg/dL Final   • Sodium 12/04/2016 135* 136 - 145 mmol/L Final   • Potassium 12/04/2016 3.7  3.5 - 5.2 mmol/L Final   • Chloride 12/04/2016 97* 98 - 107 mmol/L Final   • CO2 12/04/2016 26.2  22.0 - 29.0 mmol/L Final   • Calcium 12/04/2016 8.4* 8.8 - 10.5 mg/dL Final   • eGFR Non African Amer 12/04/2016 80  >60 mL/min/1.73 Final   • BUN/Creatinine Ratio 12/04/2016 12.2  7.0 - 25.0 Final   • Anion Gap 12/04/2016 11.8  mmol/L Final   • WBC 12/04/2016 6.86  4.80 - 10.80 10*3/mm3 Final   • RBC 12/04/2016 3.50* 4.20 - 5.40 10*6/mm3 Final   • Hemoglobin 12/04/2016 10.8* 12.0 - 16.0 g/dL Final   • Hematocrit 12/04/2016 34.4* 37.0 - 47.0 % Final   • MCV 12/04/2016 98.3  81.0 - 99.0 fL Final   • MCH 12/04/2016 30.9  27.0 - 31.0 pg Final   • MCHC 12/04/2016 31.4  31.0 - 37.0 g/dL Final   • RDW 12/04/2016 12.5  11.5 - 14.5 % Final   • RDW-SD 12/04/2016 45.0  37.0 - 54.0 fl Final   • MPV 12/04/2016 11.6* 7.4 - 10.4 fL Final   • Platelets 12/04/2016 210  140 - 500 10*3/mm3 Final   • Neutrophil % 12/04/2016 43.3* 45.0 - 70.0 % Final   • Lymphocyte % 12/04/2016 34.8  20.0 - 45.0 % Final   • Monocyte % 12/04/2016 15.9* 3.0 - 8.0 % Final   • Eosinophil % 12/04/2016 4.5* 0.0 - 4.0 % Final   • Basophil % 12/04/2016 0.9  0.0 - 2.0 % Final   • Immature Grans % 12/04/2016 0.6* 0.0 - 0.5 % Final   • Neutrophils, Absolute 12/04/2016 2.97  1.50 - 8.30 10*3/mm3 Final   • Lymphocytes, Absolute 12/04/2016 2.39  0.60 - 4.80 10*3/mm3 Final   • Monocytes, Absolute 12/04/2016 1.09* 0.00 - 1.00 10*3/mm3 Final   • Eosinophils, Absolute 12/04/2016 0.31* 0.10 - 0.30 10*3/mm3 Final   • Basophils, Absolute 12/04/2016 0.06  0.00 - 0.20 10*3/mm3 Final   • Immature Grans, Absolute 12/04/2016 0.04* 0.00 - 0.03 10*3/mm3 Final   • nRBC 12/04/2016 0.0  0.0 - 0.0 /100 WBC Final     No results found.    PHYSICAL EXAM  Physical Exam    Constitutional: She is oriented to person, place, and time. She appears well-developed and well-nourished. No distress.   HENT:   Head: Normocephalic and atraumatic.   Mouth/Throat: Oropharynx is clear and moist.   Eyes: EOM are normal. Pupils are equal, round, and reactive to light.   Neck: Normal range of motion. No tracheal deviation present.   Cardiovascular: Normal rate, regular rhythm, normal heart sounds and intact distal pulses.  Exam reveals no gallop and no friction rub.    No murmur heard.  Pulmonary/Chest: Effort normal and breath sounds normal. No stridor. No respiratory distress. She has no wheezes. She has no rales. She exhibits no tenderness.   Abdominal: Soft. Bowel sounds are normal. She exhibits no distension. There is no tenderness. There is no rebound and no guarding.   Musculoskeletal: She exhibits no edema.   Lymphadenopathy:     She has no cervical adenopathy.   Neurological: She is alert and oriented to person, place, and time.   Skin: Skin is warm. She is not diaphoretic.   Psychiatric: She has a normal mood and affect. Her behavior is normal. Judgment and thought content normal.   Nursing note and vitals reviewed.      ASSESSMENT  Diagnoses and all orders for this visit:    Crohn's disease without complication, unspecified gastrointestinal tract location          PLAN  No Follow-up on file.      She is doing well on imuran and continue same.. Will get cbc from pcp office.

## 2018-05-24 ENCOUNTER — APPOINTMENT (OUTPATIENT)
Dept: BONE DENSITY | Facility: HOSPITAL | Age: 63
End: 2018-05-24

## 2018-05-24 ENCOUNTER — HOSPITAL ENCOUNTER (OUTPATIENT)
Dept: MAMMOGRAPHY | Facility: HOSPITAL | Age: 63
Discharge: HOME | End: 2018-05-24
Admitting: PHYSICIAN ASSISTANT

## 2018-05-24 DIAGNOSIS — Z78.0 POSTMENOPAUSAL STATUS: ICD-10-CM

## 2018-05-24 DIAGNOSIS — Z12.39 SCREENING BREAST EXAMINATION: ICD-10-CM

## 2018-05-24 PROCEDURE — 77067 SCR MAMMO BI INCL CAD: CPT

## 2018-05-24 PROCEDURE — 77080 DXA BONE DENSITY AXIAL: CPT

## 2018-05-24 PROCEDURE — 77063 BREAST TOMOSYNTHESIS BI: CPT

## 2018-07-10 ENCOUNTER — TELEPHONE (OUTPATIENT)
Dept: GASTROENTEROLOGY | Facility: CLINIC | Age: 63
End: 2018-07-10

## 2018-07-10 RX ORDER — AZATHIOPRINE 50 MG/1
TABLET ORAL
Qty: 405 TABLET | Refills: 6 | Status: SHIPPED | OUTPATIENT
Start: 2018-07-10 | End: 2019-10-01 | Stop reason: SDUPTHER

## 2018-07-17 ENCOUNTER — OFFICE VISIT (OUTPATIENT)
Dept: SLEEP MEDICINE | Facility: HOSPITAL | Age: 63
End: 2018-07-17
Attending: INTERNAL MEDICINE

## 2018-07-17 VITALS
HEIGHT: 62 IN | WEIGHT: 293 LBS | HEART RATE: 66 BPM | BODY MASS INDEX: 53.92 KG/M2 | DIASTOLIC BLOOD PRESSURE: 75 MMHG | SYSTOLIC BLOOD PRESSURE: 150 MMHG

## 2018-07-17 DIAGNOSIS — G47.33 OBSTRUCTIVE SLEEP APNEA: Primary | ICD-10-CM

## 2018-07-17 DIAGNOSIS — E66.01 OBESITY, MORBID, BMI 40.0-49.9 (HCC): ICD-10-CM

## 2018-07-17 DIAGNOSIS — G47.61 PERIODIC LIMB MOVEMENT DISORDER (PLMD): ICD-10-CM

## 2018-07-17 DIAGNOSIS — G47.10 HYPERSOMNOLENCE: ICD-10-CM

## 2018-07-17 NOTE — PROGRESS NOTES
[facility]  [address]  [address]  [facility] [address]  [phone]    PCP: MERCEDES Valente    Reason for visit:  Sleep disorders: KELLY Skinner is a 62 y.o.female who was seen in the [facility] today. We did a new sleep study and got her a new machine. She states that she slept poorly on night of study and was restless due to that reason. Typically she slept from 1:30a to 10a. She is using nasal pillows and fits well. No air leaks or dry mouth. She wakes up not very rested and never has. We sent her prescription for pramipexole but she did not  as she feels not significant restlessness at night.  [place] Sleepiness Scale is 8. Caffeine 2 per day. Alcohol 0 per week.    Susanne  reports that she has never smoked. She has never used smokeless tobacco.    Pertinent Positive Review of Systems of denies.  Rest of Review of Systems was negative as recorded in Sleep Questionnaire.    Patient  has a past medical history of Arthritis; Crohn's disease (CMS/HCC); Crohn's disease (CMS/HCC); GERD (gastroesophageal reflux disease); Glaucoma; H/O renal failure; High cholesterol; History of transfusion; Hypertension; Hypothyroidism; Respiratory failure, post-operative (CMS/HCC); Sleep apnea; Ventral hernia; and Vertigo.     Current Medications:    Current Outpatient Prescriptions:   •  amLODIPine (NORVASC) 5 MG tablet, Take 5 mg by mouth Daily., Disp: , Rfl:   •  azaTHIOprine (IMURAN) 50 MG tablet, TAKE FOUR AND ONE HALF (4 1/2) TABLET BY MOUTH DAILY, Disp: 405 tablet, Rfl: 6  •  B Complex Vitamins (VITAMIN B COMPLEX) capsule capsule, Take 1 capsule by mouth Daily., Disp: , Rfl:   •  Biotin 10 MG tablet, Take 10 mg by mouth Daily., Disp: , Rfl:   •  Calcium Carb-Cholecalciferol (CALCIUM 600 + D PO), Take 2 tablets by mouth 2 (Two) Times a Day., Disp: , Rfl:   •  celecoxib (CeleBREX) 200 MG capsule, Take 200 mg by mouth Daily., Disp: , Rfl:   •  Cholecalciferol (VITAMIN  "D3) 2000 UNITS capsule, Take 1,000 Units by mouth Daily., Disp: , Rfl:   •  Cyanocobalamin (B-12) 5000 MCG sublingual tablet, Place 1 tablet under the tongue Daily., Disp: , Rfl:   •  Glucosamine-Chondroitin 750-600 MG tablet, Take 2 tablets by mouth Daily., Disp: , Rfl:   •  levothyroxine (SYNTHROID, LEVOTHROID) 50 MCG tablet, Take 50 mcg by mouth Daily., Disp: , Rfl:   •  lisinopril-hydrochlorothiazide (PRINZIDE,ZESTORETIC) 20-12.5 MG per tablet, Take 1 tablet by mouth Daily., Disp: , Rfl:   •  meclizine (ANTIVERT) 25 MG tablet, Take 25 mg by mouth Every Night., Disp: , Rfl:   •  mometasone (NASONEX) 50 MCG/ACT nasal spray, 2 sprays into each nostril Daily., Disp: , Rfl:   •  Multiple Vitamin (MULTI VITAMIN DAILY PO), Take 1 tablet by mouth Daily., Disp: , Rfl:   •  Omega-3 Fatty Acids (FISH OIL) 1000 MG capsule capsule, Take 500 mg by mouth Daily With Breakfast., Disp: , Rfl:   •  omeprazole (PriLOSEC) 20 MG capsule, Take 20 mg by mouth Daily., Disp: , Rfl:   •  ondansetron (ZOFRAN) 4 MG tablet, Take 1 tablet by mouth Every 6 (Six) Hours As Needed for vomiting., Disp: 20 tablet, Rfl: 0  •  pravastatin (PRAVACHOL) 40 MG tablet, Take 40 mg by mouth Every Night., Disp: , Rfl:   •  ranitidine (ZANTAC) 150 MG tablet, Take 150 mg by mouth Every Night., Disp: , Rfl:   •  sertraline (ZOLOFT) 50 MG tablet, Take 50 mg by mouth Every Night., Disp: , Rfl:   •  travoprost, BAK free, (TRAVATAN) 0.004 % solution ophthalmic solution, Administer 1 drop to both eyes Every Night., Disp: , Rfl:   •  vitamin C (ASCORBIC ACID) 250 MG tablet, Take 1,000 mg by mouth Daily., Disp: , Rfl:    also entered in Sleep Questionnaire         Vital Signs: /75   Pulse 66   Ht 157.5 cm (62\")   Wt (!) 154 kg (340 lb)   BMI 62.19 kg/m²     Body mass index is 62.19 kg/m².       Tongue: normal      Dentition: good       Pharynx: Posterior pharyngeal pillars are wide   Mallampatti: II (hard and soft palate, upper portion of tonsils anduvula " visible)        General: Alert. Cooperative. Well developed. No acute distress.             Head:  Normocephalic. Symmetrical. Atraumatic.              Nose: No septal deviation. No drainage.          Throat: No oral lesions. No thrush. Moist mucous membranes.    Chest Wall:  Normal shape. Symmetric expansion with respiration. No tenderness.             Neck:  Trachea midline.           Lungs:  Clear to auscultation bilaterally. No wheezes. No rhonchi. No rales. Respirations regular, even and unlabored.            Heart:  Regular rhythm and normal rate. Normal S1 and S2. No murmur.     Abdomen:  Soft, non-tender and non-distended. Normal bowel sounds. No masses.  Extremities:  Moves all extremities well. No edema.    Psychiatric: Normal mood and affect.    Study:  · 5/22/17  Overnight split polysomnogram study that was completed as diagnostic only since sufficient severity of obstructive sleep apnea was not present.  Sleep efficiency was 68.7% with 4.44 hours of total sleep time.  AHI index is 5.2.  Sleep architecture was abnormal with absence of REM sleep.  Due to the absence of REM sleep severity may be underestimated.  Oxygen saturation remained above 89%.  PLM index increased to 69.6 with PLM arousal index of 4.1.  Snoring noted for 40.7% of total sleep time.    Testing:  · Thirty-day compliance is 100%.  Average usage of 7 hours 50 minutes.  AHI 0.6.  Set pressure is 8 cm.     DME Company: [employer]    Impression:  1. Obstructive sleep apnea    2. Hypersomnolence    3. Periodic limb movement disorder (PLMD)    4. Obesity, morbid, BMI 40.0-49.9 (CMS/HCC)        Plan:  Susanne continues to have daytime hypersomnolence.  She states this has never been corrected with the CPAP machine even when she had her older device.  I will empirically trial her on a higher CPAP set pressure of 11 cm.  Check nocturnal oximetry study on same.  If persistent hypersomnolence then she requires a titration study in the lab.  She feels  her PLM was entirely due to poor sleep efficiency due to lack of CPAP on the diagnostic study.  As such she does not wish to use any pramipexole at this time.  We'll defer as we do not have a titration study and she denies restlessness with CPAP.    I reiterated the importance of effective treatment of obstructive sleep apnea with PAP machine.  Cardiovascular health risks of untreated sleep apnea were again reviewed.  Patient was asked to remain cautious if there is persistent hypersomnolence. The benefit of weight loss in reducing severity of obstructive sleep apnea was discussed.  Patient would benefit from adhering to a strict diet to achieve ideal BMI.     Change of PAP supplies regularly is important for effective use.  Change of cushion on the mask or plugs on nasal pillows along with disposable filters once every month and change of mask frame, tubing, headgear and Velcro straps every 6 months at the minimum was reiterated.    This patient is compliant with PAP machine and benefits from its use.  Apnea hypopneas index is corrected/improved.    Patient will follow up in this clinic in 3 months.    Thank you for allowing me to participate in your patient's care.    Hang Roberts MD    Part of this note may be an electronic transcription/translation of spoken language to printed text using the Dragon Dictation System.

## 2018-10-23 ENCOUNTER — OFFICE VISIT (OUTPATIENT)
Dept: SLEEP MEDICINE | Facility: HOSPITAL | Age: 63
End: 2018-10-23
Attending: INTERNAL MEDICINE

## 2018-10-23 VITALS
SYSTOLIC BLOOD PRESSURE: 151 MMHG | HEIGHT: 62 IN | HEART RATE: 68 BPM | DIASTOLIC BLOOD PRESSURE: 68 MMHG | WEIGHT: 293 LBS | BODY MASS INDEX: 53.92 KG/M2

## 2018-10-23 DIAGNOSIS — G47.33 OBSTRUCTIVE SLEEP APNEA: Primary | ICD-10-CM

## 2018-10-23 DIAGNOSIS — E66.01 OBESITY, MORBID, BMI 40.0-49.9 (HCC): ICD-10-CM

## 2018-10-23 NOTE — PROGRESS NOTES
[facility] SLEEP MEDICINE  [address]  3rd Floor  [facility] [place] [address]  [phone]    PCP: Yolanda Marshall PA    Reason for visit:  Sleep disorders: KELLY    Susanne is a 63 y.o.female who was seen in the [facility] today. She is doing better with her CPAP at a higher pressure of 11cms. Sleeps from 2a to 10:30a. Denies drowsy with driving. Sleepy when she awakens but not sleepy during rest of day. Using dreamwear nasal pillows. Changes supplies regularly.  [place] Sleepiness Scale is 6. Caffeine 2 per day. Alcohol 0 per week.    Susanne  reports that  has never smoked. she has never used smokeless tobacco.    Pertinent Positive Review of Systems of denies  Rest of Review of Systems was negative as recorded in Sleep Questionnaire.    Patient  has a past medical history of Arthritis, Crohn's disease (CMS/HCC), Crohn's disease (CMS/HCC), GERD (gastroesophageal reflux disease), Glaucoma, H/O renal failure, High cholesterol, History of transfusion, Hypertension, Hypothyroidism, Respiratory failure, post-operative (CMS/HCC), Sleep apnea, Ventral hernia, and Vertigo.     Current Medications:    Current Outpatient Medications:   •  amLODIPine (NORVASC) 5 MG tablet, Take 5 mg by mouth Daily., Disp: , Rfl:   •  azaTHIOprine (IMURAN) 50 MG tablet, TAKE FOUR AND ONE HALF (4 1/2) TABLET BY MOUTH DAILY, Disp: 405 tablet, Rfl: 6  •  B Complex Vitamins (VITAMIN B COMPLEX) capsule capsule, Take 1 capsule by mouth Daily., Disp: , Rfl:   •  Biotin 10 MG tablet, Take 10 mg by mouth Daily., Disp: , Rfl:   •  Calcium Carb-Cholecalciferol (CALCIUM 600 + D PO), Take 2 tablets by mouth 2 (Two) Times a Day., Disp: , Rfl:   •  celecoxib (CeleBREX) 200 MG capsule, Take 200 mg by mouth Daily., Disp: , Rfl:   •  Cholecalciferol (VITAMIN D3) 2000 UNITS capsule, Take 1,000 Units by mouth Daily., Disp: , Rfl:   •  Cyanocobalamin (B-12) 5000 MCG sublingual tablet, Place 1 tablet under the tongue Daily., Disp: , Rfl:  "  •  Glucosamine-Chondroitin 750-600 MG tablet, Take 2 tablets by mouth Daily., Disp: , Rfl:   •  levothyroxine (SYNTHROID, LEVOTHROID) 50 MCG tablet, Take 50 mcg by mouth Daily., Disp: , Rfl:   •  lisinopril-hydrochlorothiazide (PRINZIDE,ZESTORETIC) 20-12.5 MG per tablet, Take 1 tablet by mouth Daily., Disp: , Rfl:   •  meclizine (ANTIVERT) 25 MG tablet, Take 25 mg by mouth Every Night., Disp: , Rfl:   •  mometasone (NASONEX) 50 MCG/ACT nasal spray, 2 sprays into each nostril Daily., Disp: , Rfl:   •  Multiple Vitamin (MULTI VITAMIN DAILY PO), Take 1 tablet by mouth Daily., Disp: , Rfl:   •  Omega-3 Fatty Acids (FISH OIL) 1000 MG capsule capsule, Take 500 mg by mouth Daily With Breakfast., Disp: , Rfl:   •  omeprazole (PriLOSEC) 20 MG capsule, Take 20 mg by mouth Daily., Disp: , Rfl:   •  ondansetron (ZOFRAN) 4 MG tablet, Take 1 tablet by mouth Every 6 (Six) Hours As Needed for vomiting., Disp: 20 tablet, Rfl: 0  •  pravastatin (PRAVACHOL) 40 MG tablet, Take 40 mg by mouth Every Night., Disp: , Rfl:   •  ranitidine (ZANTAC) 150 MG tablet, Take 150 mg by mouth Every Night., Disp: , Rfl:   •  sertraline (ZOLOFT) 50 MG tablet, Take 50 mg by mouth Every Night., Disp: , Rfl:   •  travoprost, BAK free, (TRAVATAN) 0.004 % solution ophthalmic solution, Administer 1 drop to both eyes Every Night., Disp: , Rfl:   •  vitamin C (ASCORBIC ACID) 250 MG tablet, Take 1,000 mg by mouth Daily., Disp: , Rfl:    also entered in Sleep Questionnaire         Vital Signs: /68   Pulse 68   Ht 157.5 cm (62\")   Wt (!) 156 kg (344 lb)   BMI 62.92 kg/m²     Body mass index is 62.92 kg/m².       Tongue: large      Dentition: good       Pharynx: Posterior pharyngeal pillars are wide   Mallampatti: II (hard and soft palate, upper portion of tonsils anduvula visible)        General: Alert. Cooperative. Well developed. No acute distress.             Head:  Normocephalic. Symmetrical. Atraumatic.              Nose: No septal deviation. No " drainage.          Throat: No oral lesions. No thrush. Moist mucous membranes.    Chest Wall:  Normal shape. Symmetric expansion with respiration. No tenderness.             Neck:  Trachea midline.           Lungs:  Clear to auscultation bilaterally. No wheezes. No rhonchi. No rales. Respirations regular, even and unlabored.            Heart:  Regular rhythm and normal rate. Normal S1 and S2. No murmur.     Abdomen:  Soft, non-tender and non-distended. Normal bowel sounds. No masses.  Extremities:  Moves all extremities well. No edema.    Psychiatric: Normal mood and affect.    Study:  · 5/22/17  Overnight split polysomnogram study that was completed as diagnostic only since sufficient severity of obstructive sleep apnea was not present.  Sleep efficiency was 68.7% with 4.44 hours of total sleep time.  AHI index is 5.2.  Sleep architecture was abnormal with absence of REM sleep.  Due to the absence of REM sleep severity may be underestimated.  Oxygen saturation remained above 89%.  PLM index increased to 69.6 with PLM arousal index of 4.1.  Snoring noted for 40.7% of total sleep time.  · 7/28/18 Nocox on CPAP 11cms: No significant desat    Testing:  · Compliance for 90 days is 100%.  Average usage 7 hours 39 minutes.  AHI 0.2.  Set pressure is 11 cm.    DME Company: [employer]    Impression:  1. Obstructive sleep apnea    2. Obesity, morbid, BMI 40.0-49.9 (CMS/[facility])        Plan:  Susanne is compliant with the machine and benefits from its use.  She is sleepy when she awakens in the morning.  However this dissipates after a couple of hours.  Her sleep time is quite late at night.  She was asked to take her evening medications at 9 PM.  Hopefully this will help with morning alertness.  Her nocturnal oximetry report became available after she left.  It did not show any significant desat with CPAP.  Patient was informed by staff.      I reiterated the importance of effective treatment of obstructive sleep apnea with PAP  machine.  Cardiovascular health risks of untreated sleep apnea were again reviewed.  Patient was asked to remain cautious if there is persistent hypersomnolence. The benefit of weight loss in reducing severity of obstructive sleep apnea was discussed.  Patient would benefit from adhering to a strict diet to achieve ideal BMI.     Change of PAP supplies regularly is important for effective use.  Change of cushion on the mask or plugs on nasal pillows along with disposable filters once every month and change of mask frame, tubing, headgear and Velcro straps every 6 months at the minimum was reiterated.    This patient is compliant with PAP machine and benefits from its use.  Apnea hypopneas index is corrected/improved.  Daytime hypersomnolence has resolved.     Patient will follow up in this clinic in 1 year.    Thank you for allowing me to participate in your patient's care.    Hang Roberts MD    Part of this note may be an electronic transcription/translation of spoken language to printed text using the Dragon Dictation System.

## 2019-03-06 ENCOUNTER — TRANSCRIBE ORDERS (OUTPATIENT)
Dept: ADMINISTRATIVE | Facility: HOSPITAL | Age: 64
End: 2019-03-06

## 2019-03-06 DIAGNOSIS — R00.2 PALPITATION: Primary | ICD-10-CM

## 2019-03-11 ENCOUNTER — HOSPITAL ENCOUNTER (OUTPATIENT)
Dept: CARDIOLOGY | Facility: HOSPITAL | Age: 64
Discharge: HOME | End: 2019-03-11
Admitting: PHYSICIAN ASSISTANT

## 2019-03-11 DIAGNOSIS — R00.2 PALPITATION: ICD-10-CM

## 2019-03-11 PROCEDURE — 93226 XTRNL ECG REC<48 HR SCAN A/R: CPT

## 2019-03-11 PROCEDURE — 93225 XTRNL ECG REC<48 HRS REC: CPT

## 2019-03-13 PROCEDURE — 93227 XTRNL ECG REC<48 HR R&I: CPT | Performed by: INTERNAL MEDICINE

## 2019-05-23 ENCOUNTER — OFFICE VISIT (OUTPATIENT)
Dept: ORTHOPEDIC SURGERY | Facility: CLINIC | Age: 64
End: 2019-05-23

## 2019-05-23 VITALS
BODY MASS INDEX: 53.92 KG/M2 | HEART RATE: 67 BPM | WEIGHT: 293 LBS | SYSTOLIC BLOOD PRESSURE: 148 MMHG | HEIGHT: 62 IN | DIASTOLIC BLOOD PRESSURE: 78 MMHG

## 2019-05-23 DIAGNOSIS — M17.11 PRIMARY OSTEOARTHRITIS OF RIGHT KNEE: ICD-10-CM

## 2019-05-23 DIAGNOSIS — E66.01 MORBID OBESITY WITH BMI OF 60.0-69.9, ADULT (HCC): ICD-10-CM

## 2019-05-23 DIAGNOSIS — R52 PAIN: Primary | ICD-10-CM

## 2019-05-23 DIAGNOSIS — S82.031A CLOSED DISPLACED TRANSVERSE FRACTURE OF RIGHT PATELLA, INITIAL ENCOUNTER: ICD-10-CM

## 2019-05-23 PROCEDURE — 73560 X-RAY EXAM OF KNEE 1 OR 2: CPT | Performed by: ORTHOPAEDIC SURGERY

## 2019-05-23 PROCEDURE — 99203 OFFICE O/P NEW LOW 30 MIN: CPT | Performed by: ORTHOPAEDIC SURGERY

## 2019-05-23 NOTE — PROGRESS NOTES
Subjective: Right knee pain     Patient ID: Susanne Harper is a 63 y.o. female.    Chief Complaint:    History of Present Illness  63-year-old female was seen by me today for the first time regarding right knee pain that she has had for approximately a month.  Patient has a long-standing history of bilateral knee pain treated previously by the physicians with injections in the past month the pain in the right knee has significantly increased.  No history of trauma associated with the onset of her increasing pain discomfort.  Patient was seen at the urgent care center here May 9 and x-ray.  X-ray shows end-stage tricompartmental arthritis and also show what appeared to be transverse fracture minimally displaced involving the inferior pole of patella.  The patient had no history of trauma was felt that this was an incidental finding.  She was given prednisone and states that he does feel better still does have some pain and discomfort.       Social History     Occupational History   • Not on file   Tobacco Use   • Smoking status: Never Smoker   • Smokeless tobacco: Never Used   Substance and Sexual Activity   • Alcohol use: No   • Drug use: No   • Sexual activity: Defer      Review of Systems   Constitutional: Negative for chills, diaphoresis, fever and unexpected weight change.   HENT: Negative for hearing loss, nosebleeds, sore throat and tinnitus.    Eyes: Negative for pain and visual disturbance.   Respiratory: Negative for cough, shortness of breath and wheezing.    Cardiovascular: Positive for palpitations. Negative for chest pain.   Gastrointestinal: Negative for abdominal pain, diarrhea, nausea and vomiting.   Endocrine: Negative for cold intolerance, heat intolerance and polydipsia.   Genitourinary: Negative for difficulty urinating, dysuria and hematuria.   Musculoskeletal: Positive for arthralgias, joint swelling and myalgias.   Skin: Negative for rash and wound.   Allergic/Immunologic: Negative for  environmental allergies.   Neurological: Negative for dizziness, syncope and numbness.   Hematological: Does not bruise/bleed easily.   Psychiatric/Behavioral: Negative for dysphoric mood and sleep disturbance. The patient is not nervous/anxious.          Past Medical History:   Diagnosis Date   • Arthritis    • Crohn's disease (CMS/HCC)    • Crohn's disease (CMS/HCC)    • GERD (gastroesophageal reflux disease)    • Glaucoma    • H/O renal failure     post-op   • High cholesterol    • History of transfusion    • Hypertension    • Hypothyroidism    • Respiratory failure, post-operative (CMS/HCC)     on vent x3 days 12/2015   • Sleep apnea     w/cpap   • Ventral hernia     sched repair   • Vertigo      Past Surgical History:   Procedure Laterality Date   • GANGLION CYST EXCISION     • HYSTERECTOMY     • OMENTECTOMY     • TONSILLECTOMY     • UMBILICAL HERNIA REPAIR  12/2015   • VENTRAL/INCISIONAL HERNIA REPAIR N/A 11/30/2016    Procedure: VENTRAL/INCISIONAL HERNIA REPAIR;  Surgeon: Sumit Logan MD;  Location: [facility];  Service:      Family History   Problem Relation Age of Onset   • Arthritis Mother    • Diabetes Maternal Grandmother    • Glaucoma Maternal Grandmother    • Heart disease Maternal Grandfather    • Cancer Maternal Grandfather    • Heart disease Paternal Grandfather    • Breast cancer Neg Hx          Objective:  Vitals:    05/23/19 1101   BP: 148/78   Pulse: 67         05/23/19  1101   Weight: (!) 163 kg (360 lb)     Body mass index is 65.84 kg/m².        Ortho Exam   Repeat lateral view of the knee was taken today and it does show further displacement of the inferior pole transverse fracture.  She is alert and oriented x3.  Head is normocephalic and sclerae clear.  The right leg is 0 to about 95 possibly 100 degrees of motion though there is pain with full flexion.  Pain is anterior and there is some tenderness over the inferior pole of patella fully extend the knee against resistance although  there is mild to moderate discomfort.  No instability at 0 90 degrees no appreciable varus or valgus instability at 0 30 degrees.  Calf is nontender.  She has good distal pulses motor or sensory deficit.  Quad function is 5/5 and the skin is cool to touch.  She is good capillary refill.  Is currently taking Celebrex without any GI side effect.  Again she completed a dose of steroid pack and states the knee does feel slightly better but is still some pain.    Assessment:        1. Pain    2. Primary osteoarthritis of right knee    3. Closed displaced transverse fracture of right patella, initial encounter    4. Morbid obesity with BMI of 60.0-69.9, adult (CMS/[facility])           Plan: 20 minutes was spent reviewing the patient's previous x-rays her history and the new x-rays done today.  My concern based on the new x-rays and that she has a fracture of the inferior pole of patella possibly a stress fracture that is beginning to displace even further.  I may get a stat CT scan of the knee today base further treatment on the results of the CT scan.  If it in the immobilizer on the patient the largest knee immobilizer rehab will not fit due to the patient's wife again her BMI is over 65.  She was involved to touch down weightbearing minimizing weight she puts in the right leg using the crutches or a walker completed definitive treatment plan can be made.  Patient was in agreement with those recommendations            Work Status:    JADE query complete.    Orders:  Orders Placed This Encounter   Procedures   • XR Knee 1 or 2 View Right   • CT knee right wo contrast       Medications:  No orders of the defined types were placed in this encounter.      Followup:  Return in about 1 week (around 5/30/2019).          Dictated utilizing Dragon dictation

## 2019-05-24 ENCOUNTER — HOSPITAL ENCOUNTER (OUTPATIENT)
Dept: CT IMAGING | Facility: HOSPITAL | Age: 64
Discharge: HOME | End: 2019-05-24
Admitting: ORTHOPAEDIC SURGERY

## 2019-05-24 DIAGNOSIS — M17.11 PRIMARY OSTEOARTHRITIS OF RIGHT KNEE: ICD-10-CM

## 2019-05-24 DIAGNOSIS — S82.031A CLOSED DISPLACED TRANSVERSE FRACTURE OF RIGHT PATELLA, INITIAL ENCOUNTER: ICD-10-CM

## 2019-05-24 DIAGNOSIS — R52 PAIN: ICD-10-CM

## 2019-05-24 PROCEDURE — 73700 CT LOWER EXTREMITY W/O DYE: CPT

## 2019-05-28 ENCOUNTER — OFFICE VISIT (OUTPATIENT)
Dept: ORTHOPEDIC SURGERY | Facility: CLINIC | Age: 64
End: 2019-05-28

## 2019-05-28 VITALS — WEIGHT: 293 LBS | BODY MASS INDEX: 53.92 KG/M2 | HEIGHT: 62 IN

## 2019-05-28 DIAGNOSIS — S82.031A CLOSED DISPLACED TRANSVERSE FRACTURE OF RIGHT PATELLA, INITIAL ENCOUNTER: Primary | ICD-10-CM

## 2019-05-28 PROCEDURE — 73560 X-RAY EXAM OF KNEE 1 OR 2: CPT | Performed by: ORTHOPAEDIC SURGERY

## 2019-05-28 PROCEDURE — 27520 TREAT KNEECAP FRACTURE: CPT | Performed by: ORTHOPAEDIC SURGERY

## 2019-05-28 NOTE — PROGRESS NOTES
Subjective: Right inferior pole fracture patella     Patient ID: Susanne Harper is a 63 y.o. female.    Chief Complaint:    History of Present Illness patient returns with results of the CT scan which did confirm a fracture involving the inferior pole of patella.  Is a complete fracture.  Patient experiencing minimal pain at the fracture site on exam.       Social History     Occupational History   • Not on file   Tobacco Use   • Smoking status: Never Smoker   • Smokeless tobacco: Never Used   Substance and Sexual Activity   • Alcohol use: No   • Drug use: No   • Sexual activity: Defer      Review of Systems   Constitutional: Negative for chills, diaphoresis, fever and unexpected weight change.   HENT: Negative for hearing loss, nosebleeds, sore throat and tinnitus.    Eyes: Negative for pain and visual disturbance.   Respiratory: Negative for cough, shortness of breath and wheezing.    Cardiovascular: Negative for chest pain and palpitations.   Gastrointestinal: Negative for abdominal pain, diarrhea, nausea and vomiting.   Endocrine: Negative for cold intolerance, heat intolerance and polydipsia.   Genitourinary: Negative for difficulty urinating, dysuria and hematuria.   Musculoskeletal: Positive for arthralgias, joint swelling and myalgias.   Skin: Negative for rash and wound.   Allergic/Immunologic: Negative for environmental allergies.   Neurological: Negative for dizziness, syncope and numbness.   Hematological: Does not bruise/bleed easily.   Psychiatric/Behavioral: Negative for dysphoric mood and sleep disturbance. The patient is not nervous/anxious.          Past Medical History:   Diagnosis Date   • Arthritis    • Crohn's disease (CMS/HCC)    • Crohn's disease (CMS/HCC)    • GERD (gastroesophageal reflux disease)    • Glaucoma    • H/O renal failure     post-op   • High cholesterol    • History of transfusion    • Hypertension    • Hypothyroidism    • Respiratory failure, post-operative (CMS/HCC)     on  vent x3 days 12/2015   • Sleep apnea     w/cpap   • Ventral hernia     sched repair   • Vertigo      Past Surgical History:   Procedure Laterality Date   • GANGLION CYST EXCISION     • HYSTERECTOMY     • OMENTECTOMY     • TONSILLECTOMY     • UMBILICAL HERNIA REPAIR  12/2015   • VENTRAL/INCISIONAL HERNIA REPAIR N/A 11/30/2016    Procedure: VENTRAL/INCISIONAL HERNIA REPAIR;  Surgeon: Sumit Logan MD;  Location: [facility];  Service:      Family History   Problem Relation Age of Onset   • Arthritis Mother    • Diabetes Maternal Grandmother    • Glaucoma Maternal Grandmother    • Heart disease Maternal Grandfather    • Cancer Maternal Grandfather    • Heart disease Paternal Grandfather    • Breast cancer Neg Hx          Objective:  There were no vitals filed for this visit.      05/28/19  1311   Weight: (!) 163 kg (360 lb)     Body mass index is 65.84 kg/m².        Ortho Exam   Repeat x-ray done in the office today shows further displacement by maybe 1 mm or 2 of the inferior pole transverse fracture compared to previous x-ray.  She is alert and oriented x3.  There is no swelling noted over the fracture site and there is no ecchymosis she can extend the knee against resistance with no pain is minimal discomfort to palpation over the inferior pole of the patella.  No motor or sensory deficit the skin is cool to touch her calf is nontender.  Good capillary refill.    Assessment:      No diagnosis found.       Plan: Spent over 10 to 15 minutes with the patient and  reviewing treatment options.  She is asymptomatic and there is some displacement but if she remains nonweightbearing it may he will be may try to treat this nonoperatively and see how she does.  If it does not heal surgery is always an option.  I did advise her that she needs to be the most part nonweightbearing as much as she can on the right leg.  Attempted to fit a knee immobilizer but due to the patient's size cannot get any type of  immobilization to the knee so she is advised to keep the knee straight when she walks and again minimize the amount of weight she puts in that leg and then avoid hyperflexion.  Return next week with a repeat x-ray of the knee and lateral view.  If it displaces further we may be forced to proceed with surgery at that time.  Patient understands the risk as I explained to her and her             Work Status:    JADE query complete.    Orders:  No orders of the defined types were placed in this encounter.      Medications:  No orders of the defined types were placed in this encounter.      Followup:  No Follow-up on file.          Dictated utilizing Dragon dictation

## 2019-06-05 ENCOUNTER — OFFICE VISIT (OUTPATIENT)
Dept: ORTHOPEDIC SURGERY | Facility: CLINIC | Age: 64
End: 2019-06-05

## 2019-06-05 DIAGNOSIS — S82.031A CLOSED DISPLACED TRANSVERSE FRACTURE OF RIGHT PATELLA, INITIAL ENCOUNTER: ICD-10-CM

## 2019-06-05 DIAGNOSIS — R52 PAIN: Primary | ICD-10-CM

## 2019-06-05 PROCEDURE — 73560 X-RAY EXAM OF KNEE 1 OR 2: CPT | Performed by: ORTHOPAEDIC SURGERY

## 2019-06-05 PROCEDURE — 99024 POSTOP FOLLOW-UP VISIT: CPT | Performed by: ORTHOPAEDIC SURGERY

## 2019-06-05 NOTE — PROGRESS NOTES
Subjective: Right patella fracture     Patient ID: Susanne Harper is a 63 y.o. female.    Chief Complaint:    History of Present Illness patient a little over 3 weeks out from her injury returns.  She is try to stay nonweightbearing but obviously she is putting some weight on it when she goes from chair to chair and it out of her shower.  Not experiencing significant pain       Social History     Occupational History   • Not on file   Tobacco Use   • Smoking status: Never Smoker   • Smokeless tobacco: Never Used   Substance and Sexual Activity   • Alcohol use: No   • Drug use: No   • Sexual activity: Defer      Review of Systems   Constitutional: Negative for chills, diaphoresis, fever and unexpected weight change.   HENT: Negative for hearing loss, nosebleeds, sore throat and tinnitus.    Eyes: Negative for pain and visual disturbance.   Respiratory: Negative for cough, shortness of breath and wheezing.    Cardiovascular: Negative for chest pain and palpitations.   Gastrointestinal: Negative for abdominal pain, diarrhea, nausea and vomiting.   Endocrine: Negative for cold intolerance, heat intolerance and polydipsia.   Genitourinary: Negative for difficulty urinating, dysuria and hematuria.   Musculoskeletal: Positive for arthralgias. Negative for joint swelling and myalgias.   Skin: Negative for rash and wound.   Allergic/Immunologic: Negative for environmental allergies.   Neurological: Negative for dizziness, syncope and numbness.   Hematological: Does not bruise/bleed easily.   Psychiatric/Behavioral: Negative for dysphoric mood and sleep disturbance. The patient is not nervous/anxious.    All other systems reviewed and are negative.        Past Medical History:   Diagnosis Date   • Arthritis    • Crohn's disease (CMS/HCC)    • Crohn's disease (CMS/HCC)    • GERD (gastroesophageal reflux disease)    • Glaucoma    • H/O renal failure     post-op   • High cholesterol    • History of transfusion    • Hypertension     • Hypothyroidism    • Respiratory failure, post-operative (CMS/HCC)     on vent x3 days 12/2015   • Sleep apnea     w/cpap   • Ventral hernia     sched repair   • Vertigo      Past Surgical History:   Procedure Laterality Date   • GANGLION CYST EXCISION     • HYSTERECTOMY     • OMENTECTOMY     • TONSILLECTOMY     • UMBILICAL HERNIA REPAIR  12/2015   • VENTRAL/INCISIONAL HERNIA REPAIR N/A 11/30/2016    Procedure: VENTRAL/INCISIONAL HERNIA REPAIR;  Surgeon: Sumit Logan MD;  Location: [facility];  Service:      Family History   Problem Relation Age of Onset   • Arthritis Mother    • Diabetes Maternal Grandmother    • Glaucoma Maternal Grandmother    • Heart disease Maternal Grandfather    • Cancer Maternal Grandfather    • Heart disease Paternal Grandfather    • Breast cancer Neg Hx          Objective:  There were no vitals filed for this visit.  There were no vitals filed for this visit.  There is no height or weight on file to calculate BMI.        Ortho Exam   AP and lateral view of the lateral does demonstrate there is some further displacement of the fracture at least another millimeter if not 2.  The leg is not appreciably swollen there is no erythema.  There is mild tenderness at the fracture site.    Assessment:        1. Pain    2. Closed displaced transverse fracture of right patella, initial encounter           Plan: Reviewed the x-rays with the patient and her .  She will return next week if there is another x-ray demonstrating further displacement I think surgery would be indicated as the father this displaces the less chance that this is healed I understand to return next week with a lateral x-ray of the knee            Work Status:    JADE query complete.    Orders:  Orders Placed This Encounter   Procedures   • XR Knee 1 or 2 View Right       Medications:  No orders of the defined types were placed in this encounter.      Followup:  Return in about 1 week (around 6/12/2019).           Dictated utilizing Dragon dictation

## 2019-06-13 ENCOUNTER — OFFICE VISIT (OUTPATIENT)
Dept: ORTHOPEDIC SURGERY | Facility: CLINIC | Age: 64
End: 2019-06-13

## 2019-06-13 DIAGNOSIS — S82.031A CLOSED DISPLACED TRANSVERSE FRACTURE OF RIGHT PATELLA, INITIAL ENCOUNTER: ICD-10-CM

## 2019-06-13 DIAGNOSIS — R52 PAIN: Primary | ICD-10-CM

## 2019-06-13 PROCEDURE — 73560 X-RAY EXAM OF KNEE 1 OR 2: CPT | Performed by: ORTHOPAEDIC SURGERY

## 2019-06-13 PROCEDURE — 99024 POSTOP FOLLOW-UP VISIT: CPT | Performed by: ORTHOPAEDIC SURGERY

## 2019-06-13 NOTE — PROGRESS NOTES
Subjective: Right.  Inferior pole patella fracture     Patient ID: Susanne Harper is a 63 y.o. female.    Chief Complaint:    History of Present Illness patient is now about 4-1/2 to 5 weeks out and today's x-ray shows no change from the previous x-rays as far as position and alignment of the fracture       Social History     Occupational History   • Not on file   Tobacco Use   • Smoking status: Never Smoker   • Smokeless tobacco: Never Used   Substance and Sexual Activity   • Alcohol use: No   • Drug use: No   • Sexual activity: Defer      Review of Systems   Constitutional: Negative for chills, diaphoresis, fever and unexpected weight change.   HENT: Negative for hearing loss, nosebleeds, sore throat and tinnitus.    Eyes: Negative for pain and visual disturbance.   Respiratory: Negative for cough, shortness of breath and wheezing.    Cardiovascular: Negative for chest pain and palpitations.   Gastrointestinal: Negative for abdominal pain, diarrhea, nausea and vomiting.   Endocrine: Negative for cold intolerance, heat intolerance and polydipsia.   Genitourinary: Negative for difficulty urinating, dysuria and hematuria.   Musculoskeletal: Positive for arthralgias and myalgias. Negative for joint swelling.   Skin: Negative for rash and wound.   Allergic/Immunologic: Negative for environmental allergies.   Neurological: Negative for dizziness, syncope and numbness.   Hematological: Does not bruise/bleed easily.   Psychiatric/Behavioral: Negative for dysphoric mood and sleep disturbance. The patient is not nervous/anxious.          Past Medical History:   Diagnosis Date   • Arthritis    • Crohn's disease (CMS/HCC)    • Crohn's disease (CMS/HCC)    • GERD (gastroesophageal reflux disease)    • Glaucoma    • H/O renal failure     post-op   • High cholesterol    • History of transfusion    • Hypertension    • Hypothyroidism    • Respiratory failure, post-operative (CMS/HCC)     on vent x3 days 12/2015   • Sleep apnea      w/cpap   • Ventral hernia     sched repair   • Vertigo      Past Surgical History:   Procedure Laterality Date   • GANGLION CYST EXCISION     • HYSTERECTOMY     • OMENTECTOMY     • TONSILLECTOMY     • UMBILICAL HERNIA REPAIR  12/2015   • VENTRAL/INCISIONAL HERNIA REPAIR N/A 11/30/2016    Procedure: VENTRAL/INCISIONAL HERNIA REPAIR;  Surgeon: Sumit Logan MD;  Location: [facility];  Service:      Family History   Problem Relation Age of Onset   • Arthritis Mother    • Diabetes Maternal Grandmother    • Glaucoma Maternal Grandmother    • Heart disease Maternal Grandfather    • Cancer Maternal Grandfather    • Heart disease Paternal Grandfather    • Breast cancer Neg Hx          Objective:  There were no vitals filed for this visit.  There were no vitals filed for this visit.  There is no height or weight on file to calculate BMI.        Ortho Exam   She is alert and oriented x3.  Again she is not having significant pain in the AP and lateral x-ray shows no change the displacement of the patella fragment    Assessment:        1. Pain    2. Closed displaced transverse fracture of right patella, initial encounter           Plan: Patient is since gotten herself a knee immobilizer and I want her to wear when she is ambulating but again she is to be nonweightbearing and she does not wear the knee immobilizer to bed.  Return in 3 weeks with repeat x-ray to evaluate the alignment and healing.  May need a CT scan            Work Status:    JADE query complete.    Orders:  Orders Placed This Encounter   Procedures   • XR Knee 1 or 2 View Right       Medications:  No orders of the defined types were placed in this encounter.      Followup:  Return in about 3 weeks (around 7/4/2019).          Dictated utilizing Dragon dictation

## 2019-07-03 ENCOUNTER — OFFICE VISIT (OUTPATIENT)
Dept: ORTHOPEDIC SURGERY | Facility: CLINIC | Age: 64
End: 2019-07-03

## 2019-07-03 DIAGNOSIS — S82.031A CLOSED DISPLACED TRANSVERSE FRACTURE OF RIGHT PATELLA, INITIAL ENCOUNTER: Primary | ICD-10-CM

## 2019-07-03 DIAGNOSIS — R52 PAIN: ICD-10-CM

## 2019-07-03 DIAGNOSIS — M17.11 PRIMARY OSTEOARTHRITIS OF RIGHT KNEE: ICD-10-CM

## 2019-07-03 PROCEDURE — 73560 X-RAY EXAM OF KNEE 1 OR 2: CPT | Performed by: ORTHOPAEDIC SURGERY

## 2019-07-03 PROCEDURE — 99024 POSTOP FOLLOW-UP VISIT: CPT | Performed by: ORTHOPAEDIC SURGERY

## 2019-07-03 RX ORDER — HYDROCHLOROTHIAZIDE 25 MG/1
TABLET ORAL
COMMUNITY
Start: 2019-07-01 | End: 2020-01-07

## 2019-07-03 NOTE — PROGRESS NOTES
Subjective: Right patella fracture     Patient ID: Susanne Harper is a 63 y.o. female.    Chief Complaint:    History of Present Illness patient is 6 weeks out from the injury.  Still having some pain when she ambulates although she is trying not to put any weight on that leg but with her size with BMI 65.8 that is difficult needle knee.       Social History     Occupational History   • Not on file   Tobacco Use   • Smoking status: Never Smoker   • Smokeless tobacco: Never Used   Substance and Sexual Activity   • Alcohol use: No   • Drug use: No   • Sexual activity: Defer      Review of Systems   Constitutional: Negative for chills, diaphoresis, fever and unexpected weight change.   HENT: Negative for hearing loss, nosebleeds, sore throat and tinnitus.    Eyes: Negative for pain and visual disturbance.   Respiratory: Negative for cough, shortness of breath and wheezing.    Cardiovascular: Negative for chest pain and palpitations.   Gastrointestinal: Negative for abdominal pain, diarrhea, nausea and vomiting.   Endocrine: Negative for cold intolerance, heat intolerance and polydipsia.   Genitourinary: Negative for difficulty urinating, dysuria and hematuria.   Musculoskeletal: Negative for arthralgias, joint swelling and myalgias.   Skin: Negative for rash and wound.   Allergic/Immunologic: Negative for environmental allergies.   Neurological: Negative for dizziness, syncope and numbness.   Hematological: Does not bruise/bleed easily.   Psychiatric/Behavioral: Negative for dysphoric mood and sleep disturbance. The patient is not nervous/anxious.          Past Medical History:   Diagnosis Date   • Arthritis    • Crohn's disease (CMS/HCC)    • Crohn's disease (CMS/HCC)    • GERD (gastroesophageal reflux disease)    • Glaucoma    • H/O renal failure     post-op   • High cholesterol    • History of transfusion    • Hypertension    • Hypothyroidism    • Respiratory failure, post-operative (CMS/HCC)     on vent x3 days  12/2015   • Sleep apnea     w/cpap   • Ventral hernia     sched repair   • Vertigo      Past Surgical History:   Procedure Laterality Date   • GANGLION CYST EXCISION     • HYSTERECTOMY     • OMENTECTOMY     • TONSILLECTOMY     • UMBILICAL HERNIA REPAIR  12/2015   • VENTRAL/INCISIONAL HERNIA REPAIR N/A 11/30/2016    Procedure: VENTRAL/INCISIONAL HERNIA REPAIR;  Surgeon: Sumit Logan MD;  Location: [facility];  Service:      Family History   Problem Relation Age of Onset   • Arthritis Mother    • Diabetes Maternal Grandmother    • Glaucoma Maternal Grandmother    • Heart disease Maternal Grandfather    • Cancer Maternal Grandfather    • Heart disease Paternal Grandfather    • Breast cancer Neg Hx          Objective:  There were no vitals filed for this visit.  There were no vitals filed for this visit.  There is no height or weight on file to calculate BMI.        Ortho Exam   AP and lateral show may be slightly increase in diastases of the fracture may be a millimeter at the most but certainly no callus is seen compared to previous x-rays patient continues to have pain to palpation over the fracture site.  No apparent motor or sensory deficit.    Assessment:        1. Closed displaced transverse fracture of right patella, initial encounter    2. Pain    3. Primary osteoarthritis of right knee           Plan: We will proceed with a CT scan to fully evaluate the fracture to see if there is any callus formation at all.  If none is seen she may need surgery to help prevent the patient from further injury from falling resulting in fracture to her hip tibia her shoulder.  Although there are significant potential complications associated with surgery may be the lesser to evils if there is no callus formation seen and she has a nonunion at that site.  Return after the CT scan has been completed.            Work Status:    JADE query complete.    Orders:  Orders Placed This Encounter   Procedures   • XR Knee 1 or 2  View Right   • CT knee right wo contrast       Medications:  No orders of the defined types were placed in this encounter.      Followup:  Return in about 1 week (around 7/10/2019).          Dictated utilizing Dragon dictation

## 2019-07-12 ENCOUNTER — APPOINTMENT (OUTPATIENT)
Dept: CT IMAGING | Facility: HOSPITAL | Age: 64
End: 2019-07-12

## 2019-07-15 ENCOUNTER — HOSPITAL ENCOUNTER (OUTPATIENT)
Dept: CT IMAGING | Facility: HOSPITAL | Age: 64
Discharge: HOME | End: 2019-07-15
Admitting: ORTHOPAEDIC SURGERY

## 2019-07-15 DIAGNOSIS — S82.031A CLOSED DISPLACED TRANSVERSE FRACTURE OF RIGHT PATELLA, INITIAL ENCOUNTER: ICD-10-CM

## 2019-07-15 DIAGNOSIS — R52 PAIN: ICD-10-CM

## 2019-07-15 DIAGNOSIS — M17.11 PRIMARY OSTEOARTHRITIS OF RIGHT KNEE: ICD-10-CM

## 2019-07-15 PROCEDURE — 73700 CT LOWER EXTREMITY W/O DYE: CPT

## 2019-07-16 ENCOUNTER — APPOINTMENT (OUTPATIENT)
Dept: SLEEP MEDICINE | Facility: HOSPITAL | Age: 64
End: 2019-07-16

## 2019-07-17 ENCOUNTER — OFFICE VISIT (OUTPATIENT)
Dept: ORTHOPEDIC SURGERY | Facility: CLINIC | Age: 64
End: 2019-07-17

## 2019-07-17 VITALS — BODY MASS INDEX: 53.92 KG/M2 | WEIGHT: 293 LBS | HEIGHT: 62 IN

## 2019-07-17 DIAGNOSIS — S82.031A CLOSED DISPLACED TRANSVERSE FRACTURE OF RIGHT PATELLA, INITIAL ENCOUNTER: Primary | ICD-10-CM

## 2019-07-17 DIAGNOSIS — R52 PAIN: ICD-10-CM

## 2019-07-17 PROCEDURE — 99024 POSTOP FOLLOW-UP VISIT: CPT | Performed by: ORTHOPAEDIC SURGERY

## 2019-07-17 NOTE — PROGRESS NOTES
Subjective: Patella fracture     Patient ID: Susanne Harper is a 63 y.o. female.    Chief Complaint:    History of Present Illness patient returns with results of the CT scans are personally reviewed by myself and with Dr. Sidhu..  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  Review of the CT scan images also show that the patella tendon may be attached to the majority of the patella and that the inferior fragment may be more of an osteophyte may be a fragment of the pole.  She does not complain of any significant pain although she has not really been weightbearing as she was prior to her       Social History     Occupational History   • Not on file   Tobacco Use   • Smoking status: Never Smoker   • Smokeless tobacco: Never Used   Substance and Sexual Activity   • Alcohol use: No   • Drug use: No   • Sexual activity: Defer      Review of Systems   Constitutional: Negative for chills, diaphoresis, fever and unexpected weight change.   HENT: Negative for hearing loss, nosebleeds, sore throat and tinnitus.    Eyes: Negative for pain and visual disturbance.   Respiratory: Negative for cough, shortness of breath and wheezing.    Cardiovascular: Negative for chest pain and palpitations.   Gastrointestinal: Negative for abdominal pain, diarrhea, nausea and vomiting.   Endocrine: Negative for cold intolerance, heat intolerance and polydipsia.   Genitourinary: Negative for difficulty urinating, dysuria and hematuria.   Musculoskeletal: Positive for arthralgias, joint swelling and myalgias.   Skin: Negative for rash and wound.   Allergic/Immunologic: Negative for environmental allergies and immunocompromised state.   Neurological: Negative for dizziness, syncope and numbness.   Hematological: Does not bruise/bleed easily.   Psychiatric/Behavioral: Negative for dysphoric mood and sleep disturbance. The patient is not nervous/anxious.          Past Medical History:   Diagnosis Date   • Arthritis    • Crohn's disease (CMS/[facility])    •  Crohn's disease (CMS/HCC)    • GERD (gastroesophageal reflux disease)    • Glaucoma    • H/O renal failure     post-op   • High cholesterol    • History of transfusion    • Hypertension    • Hypothyroidism    • Respiratory failure, post-operative (CMS/HCC)     on vent x3 days 12/2015   • Sleep apnea     w/cpap   • Ventral hernia     sched repair   • Vertigo      Past Surgical History:   Procedure Laterality Date   • GANGLION CYST EXCISION     • HYSTERECTOMY     • OMENTECTOMY     • TONSILLECTOMY     • UMBILICAL HERNIA REPAIR  12/2015   • VENTRAL/INCISIONAL HERNIA REPAIR N/A 11/30/2016    Procedure: VENTRAL/INCISIONAL HERNIA REPAIR;  Surgeon: Sumit Logan MD;  Location: [facility];  Service:      Family History   Problem Relation Age of Onset   • Arthritis Mother    • Diabetes Maternal Grandmother    • Glaucoma Maternal Grandmother    • Heart disease Maternal Grandfather    • Cancer Maternal Grandfather    • Heart disease Paternal Grandfather    • Breast cancer Neg Hx          Objective:  There were no vitals filed for this visit.      07/17/19  1437   Weight: (!) 163 kg (360 lb)     Body mass index is 65.84 kg/m².        Ortho Exam   Is alert and oriented x3 reviewed the results of the CT scan with the patient and her .  She can extend the knee against resistance with minimal to no pain.  She does have some pain about the knee when she walks but is not significant at all she does use the walker.  No motor or sensory deficit the skin is cool to touch there is no effusion to the knee and the calf is nontender.    Assessment:        1. Closed displaced transverse fracture of right patella, initial encounter    2. Pain           Plan: Reviewed the results of the CT scan of the physical findings with the patient and her .  Couple of treatment options I want to proceed with surgery to reattach the fragment of the tendon to the patella itself versus less activity as tolerated see how she does.  She  does not have any real tenderness over the inferior pole of patella the patella tendon itself may be intact and what is seen is a nonunion is actually osteophyte.  If she can ambulate without pain resolves he can be treated nonoperatively she is having pain and require surgery may be better showing obvious large osteophyte reinforce in the patella insertion into the pole of the patella but there is no need to rush to do surgery so we will try to treat this nonoperatively and they were in agreement he can weight-bear as tolerated using a walker again as pain permits return in 3 weeks with repeat x-ray IEL lateral of the knee.  As I discussed the surgery done just as easily this week is next week or next month.  They are in agreement with those recommendations            Work Status:    JADE query complete.    Orders:  No orders of the defined types were placed in this encounter.      Medications:  No orders of the defined types were placed in this encounter.      Followup:  Return in about 3 weeks (around 8/7/2019).          Dictated utilizing Dragon dictation

## 2019-08-13 ENCOUNTER — APPOINTMENT (OUTPATIENT)
Dept: SLEEP MEDICINE | Facility: HOSPITAL | Age: 64
End: 2019-08-13

## 2019-08-19 ENCOUNTER — OFFICE VISIT (OUTPATIENT)
Dept: ORTHOPEDIC SURGERY | Facility: CLINIC | Age: 64
End: 2019-08-19

## 2019-08-19 DIAGNOSIS — S82.031A CLOSED DISPLACED TRANSVERSE FRACTURE OF RIGHT PATELLA, INITIAL ENCOUNTER: ICD-10-CM

## 2019-08-19 DIAGNOSIS — M17.11 PRIMARY OSTEOARTHRITIS OF RIGHT KNEE: ICD-10-CM

## 2019-08-19 DIAGNOSIS — R52 PAIN: Primary | ICD-10-CM

## 2019-08-19 DIAGNOSIS — E66.01 MORBID OBESITY WITH BMI OF 60.0-69.9, ADULT (HCC): ICD-10-CM

## 2019-08-19 PROCEDURE — 99024 POSTOP FOLLOW-UP VISIT: CPT | Performed by: ORTHOPAEDIC SURGERY

## 2019-08-19 PROCEDURE — 73560 X-RAY EXAM OF KNEE 1 OR 2: CPT | Performed by: ORTHOPAEDIC SURGERY

## 2019-08-19 NOTE — PROGRESS NOTES
Subjective: Right knee pain     Patient ID: Susanne Harper is a 64 y.o. female.    Chief Complaint:    History of Present Illness 64-year-old female seen in follow-up to the transverse fracture of the distal pole of the patella.  She has been weightbearing with just minimal discomfort at this time.  She does walk very short distances using a walker due to her obesity       Social History     Occupational History   • Not on file   Tobacco Use   • Smoking status: Never Smoker   • Smokeless tobacco: Never Used   Substance and Sexual Activity   • Alcohol use: No   • Drug use: No   • Sexual activity: Defer      Review of Systems   Constitutional: Negative for chills, diaphoresis, fever and unexpected weight change.   HENT: Negative for hearing loss, nosebleeds, sore throat and tinnitus.    Eyes: Negative for pain and visual disturbance.   Respiratory: Negative for cough, shortness of breath and wheezing.    Cardiovascular: Negative for chest pain and palpitations.   Gastrointestinal: Negative for abdominal pain, diarrhea, nausea and vomiting.   Endocrine: Negative for cold intolerance, heat intolerance and polydipsia.   Genitourinary: Negative for difficulty urinating, dysuria and hematuria.   Musculoskeletal: Positive for arthralgias and myalgias. Negative for joint swelling.   Skin: Negative for rash and wound.   Allergic/Immunologic: Negative for environmental allergies.   Neurological: Negative for dizziness, syncope and numbness.   Hematological: Does not bruise/bleed easily.   Psychiatric/Behavioral: Negative for dysphoric mood and sleep disturbance. The patient is not nervous/anxious.          Past Medical History:   Diagnosis Date   • Arthritis    • Crohn's disease (CMS/HCC)    • Crohn's disease (CMS/HCC)    • GERD (gastroesophageal reflux disease)    • Glaucoma    • H/O renal failure     post-op   • High cholesterol    • History of transfusion    • Hypertension    • Hypothyroidism    • Respiratory failure,  post-operative (CMS/[facility])     on vent x3 days 12/2015   • Sleep apnea     w/cpap   • Ventral hernia     sched repair   • Vertigo      Past Surgical History:   Procedure Laterality Date   • GANGLION CYST EXCISION     • HYSTERECTOMY     • OMENTECTOMY     • TONSILLECTOMY     • UMBILICAL HERNIA REPAIR  12/2015   • VENTRAL/INCISIONAL HERNIA REPAIR N/A 11/30/2016    Procedure: VENTRAL/INCISIONAL HERNIA REPAIR;  Surgeon: Sumit Logan MD;  Location: [facility];  Service:      Family History   Problem Relation Age of Onset   • Arthritis Mother    • Diabetes Maternal Grandmother    • Glaucoma Maternal Grandmother    • Heart disease Maternal Grandfather    • Cancer Maternal Grandfather    • Heart disease Paternal Grandfather    • Breast cancer Neg Hx          Objective:  There were no vitals filed for this visit.  There were no vitals filed for this visit.  There is no height or weight on file to calculate BMI.        Ortho Exam   AP and lateral show increasing callus formation at the fracture site.  Is not completely healed but does not displace further.  Again she has end-stage degenerative arthritis.  There has been change as far as increasing callus compared to previous x-rays.  She is alert and oriented x3.  Again she has no pain to palpation at the fracture site.    Assessment:        1. Pain    2. Closed displaced transverse fracture of right patella, initial encounter    3. Primary osteoarthritis of right knee    4. Morbid obesity with BMI of 60.0-69.9, adult (CMS/[facility])           Plan: Continue weightbearing as tolerated that was emphasized as pain permits.  She did inquire about total knee replacement but I told her the BMI 65 she was not a candidate she needs to consider consultation with a dietitian or [facility] surgeon before surgery could be a considered.  I told her BMI has to be below 40 for surgery to be done.  With regard to the right knee and the patella fracture return in 4 weeks with repeat x-ray             Work Status:    JADE query complete.    Orders:  Orders Placed This Encounter   Procedures   • XR Knee 1 or 2 View Right       Medications:  No orders of the defined types were placed in this encounter.      Followup:  Return in about 4 weeks (around 9/16/2019).          Dictated utilizing Dragon dictation

## 2019-09-16 ENCOUNTER — OFFICE VISIT (OUTPATIENT)
Dept: ORTHOPEDIC SURGERY | Facility: CLINIC | Age: 64
End: 2019-09-16

## 2019-09-16 VITALS — HEIGHT: 62 IN | WEIGHT: 293 LBS | BODY MASS INDEX: 53.92 KG/M2

## 2019-09-16 DIAGNOSIS — S82.031A CLOSED DISPLACED TRANSVERSE FRACTURE OF RIGHT PATELLA, INITIAL ENCOUNTER: Primary | ICD-10-CM

## 2019-09-16 DIAGNOSIS — M17.11 PRIMARY OSTEOARTHRITIS OF RIGHT KNEE: ICD-10-CM

## 2019-09-16 DIAGNOSIS — R52 PAIN: ICD-10-CM

## 2019-09-16 DIAGNOSIS — M17.12 PRIMARY OSTEOARTHRITIS OF LEFT KNEE: ICD-10-CM

## 2019-09-16 DIAGNOSIS — E66.01 MORBID OBESITY WITH BMI OF 60.0-69.9, ADULT (HCC): ICD-10-CM

## 2019-09-16 PROCEDURE — 73560 X-RAY EXAM OF KNEE 1 OR 2: CPT | Performed by: ORTHOPAEDIC SURGERY

## 2019-09-16 PROCEDURE — 99024 POSTOP FOLLOW-UP VISIT: CPT | Performed by: ORTHOPAEDIC SURGERY

## 2019-09-16 NOTE — PROGRESS NOTES
Subjective: Right patella fracture, osteoarthritis both knees     Patient ID: Susanne Harper is a 64 y.o. female.    Chief Complaint:    History of Present Illness patient seen in follow-up to the fracture of the right patella.  She is doing well before is having osteoarthritic pain but not pain from the fracture site per se.       Social History     Occupational History   • Not on file   Tobacco Use   • Smoking status: Never Smoker   • Smokeless tobacco: Never Used   Substance and Sexual Activity   • Alcohol use: No   • Drug use: No   • Sexual activity: Defer      Review of Systems   Constitutional: Negative for chills, diaphoresis, fever and unexpected weight change.   HENT: Negative for hearing loss, nosebleeds, sore throat and tinnitus.    Eyes: Negative for pain and visual disturbance.   Respiratory: Negative for cough, shortness of breath and wheezing.    Cardiovascular: Negative for chest pain and palpitations.   Gastrointestinal: Negative for abdominal pain, diarrhea, nausea and vomiting.   Endocrine: Negative for cold intolerance, heat intolerance and polydipsia.   Genitourinary: Negative for difficulty urinating, dysuria and hematuria.   Musculoskeletal: Positive for arthralgias and myalgias. Negative for joint swelling.   Skin: Negative for rash and wound.   Allergic/Immunologic: Negative for environmental allergies.   Neurological: Negative for dizziness, syncope and numbness.   Hematological: Does not bruise/bleed easily.   Psychiatric/Behavioral: Negative for dysphoric mood and sleep disturbance. The patient is not nervous/anxious.          Past Medical History:   Diagnosis Date   • Arthritis    • Crohn's disease (CMS/HCC)    • Crohn's disease (CMS/HCC)    • GERD (gastroesophageal reflux disease)    • Glaucoma    • H/O renal failure     post-op   • High cholesterol    • History of transfusion    • Hypertension    • Hypothyroidism    • Respiratory failure, post-operative (CMS/HCC)     on vent x3 days  12/2015   • Sleep apnea     w/cpap   • Ventral hernia     sched repair   • Vertigo      Past Surgical History:   Procedure Laterality Date   • GANGLION CYST EXCISION     • HYSTERECTOMY     • OMENTECTOMY     • TONSILLECTOMY     • UMBILICAL HERNIA REPAIR  12/2015   • VENTRAL/INCISIONAL HERNIA REPAIR N/A 11/30/2016    Procedure: VENTRAL/INCISIONAL HERNIA REPAIR;  Surgeon: Sumit Logan MD;  Location: [facility];  Service:      Family History   Problem Relation Age of Onset   • Arthritis Mother    • Diabetes Maternal Grandmother    • Glaucoma Maternal Grandmother    • Heart disease Maternal Grandfather    • Cancer Maternal Grandfather    • Heart disease Paternal Grandfather    • Breast cancer Neg Hx          Objective:  There were no vitals filed for this visit.      09/16/19  1514   Weight: (!) 159 kg (350 lb)     Body mass index is 64.02 kg/m².        Ortho Exam   AP and lateral show callus formation at the fracture site compared to previous x-rays there is further healing.  Again she has osteoarthritic changes of both knees with crepitus and pain but she has extension against resistance in the right knee with no pain or discomfort.  She is ambulating with assistance because of the arthritis of both knees.    Assessment:        1. Closed displaced transverse fracture of right patella, initial encounter    2. Pain    3. Primary osteoarthritis of right knee    4. Primary osteoarthritis of left knee    5. Morbid obesity with BMI of 60.0-69.9, adult (CMS/[facility])           Plan: Patient has had gel injections in the past which did give her some relief and obviously with her morbid obesity BMI of over 64 the only thing that can be done is gel injections for the arthritis is cortisone in the past has not helped.  Will contact insurance carrier regarding repeating the Visco supplement injections of both knees return to begin those once authorization has been obtained            Work Status:    JADE query  complete.    Orders:  Orders Placed This Encounter   Procedures   • XR Knee 1 or 2 View Right   • Visco Treatment       Medications:  No orders of the defined types were placed in this encounter.      Followup:  Return in about 2 weeks (around 9/30/2019).          Dictated utilizing Dragon dictation

## 2019-09-24 ENCOUNTER — OFFICE VISIT (OUTPATIENT)
Dept: SLEEP MEDICINE | Facility: HOSPITAL | Age: 64
End: 2019-09-24

## 2019-09-24 VITALS
BODY MASS INDEX: 53.92 KG/M2 | HEIGHT: 62 IN | WEIGHT: 293 LBS | SYSTOLIC BLOOD PRESSURE: 137 MMHG | HEART RATE: 63 BPM | DIASTOLIC BLOOD PRESSURE: 77 MMHG

## 2019-09-24 DIAGNOSIS — E66.01 OBESITY, MORBID, BMI 40.0-49.9 (HCC): ICD-10-CM

## 2019-09-24 DIAGNOSIS — G47.33 OBSTRUCTIVE SLEEP APNEA: Primary | ICD-10-CM

## 2019-09-24 PROCEDURE — G0463 HOSPITAL OUTPT CLINIC VISIT: HCPCS

## 2019-09-24 NOTE — PROGRESS NOTES
[facility] SLEEP MEDICINE  [address]  3rd Floor  [facility] [address]  [phone]    PCP: Yolanda Marshall PA    Reason for visit:  Sleep disorders: KELLY    Susanne is a 64 y.o.female who was seen in the [facility] Disorders [facility] today. Annual fu. She is compliant with her CPAP and uses nightly. She sleeps from 2am to 10:30am. Using nasal pillows and changes them regularly. Fits well and no air leaks or dry mouth.  [place] Sleepiness Scale is 6. Caffeine 2 per day. Alcohol 0 per week.    Susanne  reports that she has never smoked. She has never used smokeless tobacco.    Pertinent Positive Review of Systems of denies  Rest of Review of Systems was negative as recorded in Sleep Questionnaire.    Patient  has a past medical history of Arthritis, Crohn's disease (CMS/HCC), Crohn's disease (CMS/HCC), GERD (gastroesophageal reflux disease), Glaucoma, H/O renal failure, High cholesterol, History of transfusion, Hypertension, Hypothyroidism, Respiratory failure, post-operative (CMS/HCC), Sleep apnea, Ventral hernia, and Vertigo.     Current Medications:    Current Outpatient Medications:   •  amLODIPine (NORVASC) 5 MG tablet, Take 5 mg by mouth Daily., Disp: , Rfl:   •  azaTHIOprine (IMURAN) 50 MG tablet, TAKE FOUR AND ONE HALF (4 1/2) TABLET BY MOUTH DAILY, Disp: 405 tablet, Rfl: 6  •  B Complex Vitamins (VITAMIN B COMPLEX) capsule capsule, Take 1 capsule by mouth Daily., Disp: , Rfl:   •  Biotin 10 MG tablet, Take 10 mg by mouth Daily., Disp: , Rfl:   •  Calcium Carb-Cholecalciferol (CALCIUM 600 + D PO), Take 2 tablets by mouth 2 (Two) Times a Day., Disp: , Rfl:   •  celecoxib (CeleBREX) 200 MG capsule, Take 200 mg by mouth Daily., Disp: , Rfl:   •  Cholecalciferol (VITAMIN D3) 2000 UNITS capsule, Take 1,000 Units by mouth Daily., Disp: , Rfl:   •  Glucosamine-Chondroitin 750-600 MG tablet, Take 2 tablets by mouth Daily., Disp: , Rfl:   •  hydrochlorothiazide (HYDRODIURIL) 25 MG tablet, , Disp: , Rfl:  "  •  latanoprost (XALATAN) 0.005 % ophthalmic solution, , Disp: , Rfl:   •  levothyroxine (SYNTHROID, LEVOTHROID) 50 MCG tablet, Take 50 mcg by mouth Daily., Disp: , Rfl:   •  lisinopril-hydrochlorothiazide (PRINZIDE,ZESTORETIC) 20-12.5 MG per tablet, Take 1 tablet by mouth Daily., Disp: , Rfl:   •  methocarbamol (ROBAXIN) 750 MG tablet, , Disp: , Rfl:   •  mometasone (NASONEX) 50 MCG/ACT nasal spray, 2 sprays into each nostril Daily., Disp: , Rfl:   •  Multiple Vitamin (MULTI VITAMIN DAILY PO), Take 1 tablet by mouth Daily., Disp: , Rfl:   •  Omega-3 Fatty Acids (FISH OIL) 1000 MG capsule capsule, Take 500 mg by mouth Daily With Breakfast., Disp: , Rfl:   •  omeprazole (PriLOSEC) 20 MG capsule, Take 20 mg by mouth Daily., Disp: , Rfl:   •  pravastatin (PRAVACHOL) 80 MG tablet, , Disp: , Rfl:   •  ranitidine (ZANTAC) 150 MG tablet, Take 150 mg by mouth Every Night., Disp: , Rfl:   •  sertraline (ZOLOFT) 100 MG tablet, , Disp: , Rfl:   •  vitamin C (ASCORBIC ACID) 250 MG tablet, Take 1,000 mg by mouth Daily., Disp: , Rfl:    also entered in Sleep Questionnaire         Vital Signs: /77   Pulse 63   Ht 157.5 cm (62\")   Wt (!) 159 kg (350 lb)   BMI 64.02 kg/m²     Body mass index is 64.02 kg/m².       Tongue: large      Dentition: good       Pharynx: Posterior pharyngeal pillars are wide   Mallampatti: III (soft and hard palate and base of uvula visible)        General: Alert. Cooperative. Well developed. No acute distress.             Head:  Normocephalic. Symmetrical. Atraumatic.              Nose: No septal deviation. No drainage.          Throat: No oral lesions. No thrush. Moist mucous membranes.    Chest Wall:  Normal shape. Symmetric expansion with respiration. No tenderness.             Neck:  Trachea midline.           Lungs:  Clear to auscultation bilaterally. No wheezes. No rhonchi. No rales. Respirations regular, even and unlabored.            Heart:  Regular rhythm and normal rate. Normal S1 and " S2. No murmur.     Abdomen:  Soft, non-tender and non-distended. Normal bowel sounds. No masses.  Extremities:  Moves all extremities well. No edema.    Psychiatric: Normal mood and affect.    Study:  · 5/22/17  Overnight split polysomnogram study that was completed as diagnostic only since sufficient severity of obstructive sleep apnea was not present.  Sleep efficiency was 68.7% with 4.44 hours of total sleep time.  AHI index is 5.2.  Sleep architecture was abnormal with absence of REM sleep.  Due to the absence of REM sleep severity may be underestimated.  Oxygen saturation remained above 89%.  PLM index increased to 69.6 with PLM arousal index of 4.1.  Snoring noted for 40.7% of total sleep time.  · 7/28/18 Nocox on CPAP 11cms: No significant desat    Testing:  · Compliance 100% with average usage 7 hours 17 minutes AHI 0.3 with set pressure 11 cm.    DME Company: [email or web address]    Impression:  1. Obstructive sleep apnea    2. Obesity, morbid, BMI 40.0-49.9 (CMS/[facility])        Plan:  Susanne is compliant and benefits from use of the machine.  She changes her supplies regularly.  She was asked to continue with same.    I reiterated the importance of effective treatment of obstructive sleep apnea with PAP machine.  Cardiovascular health risks of untreated sleep apnea were again reviewed.  Patient was asked to remain cautious if there is persistent hypersomnolence. The benefit of weight loss in reducing severity of obstructive sleep apnea was discussed.  Patient would benefit from adhering to a strict diet to achieve ideal BMI.     Change of PAP supplies regularly is important for effective use.  Change of cushion on the mask or plugs on nasal pillows along with disposable filters once every month and change of mask frame, tubing, headgear and Velcro straps every 6 months at the minimum was reiterated.    This patient is compliant with PAP machine and benefits from its use.  Apnea hypopneas index is corrected/improved.   Daytime hypersomnolence has resolved.     Patient will follow up in this clinic in 1 year     Thank you for allowing me to participate in your patient's care.    Hang Roberts MD    Part of this note may be an electronic transcription/translation of spoken language to printed text using the Dragon Dictation System.

## 2019-10-01 RX ORDER — AZATHIOPRINE 50 MG/1
TABLET ORAL
Qty: 405 TABLET | Refills: 5 | Status: SHIPPED | OUTPATIENT
Start: 2019-10-01 | End: 2020-12-28 | Stop reason: SDUPTHER

## 2019-11-05 ENCOUNTER — OFFICE VISIT (OUTPATIENT)
Dept: GASTROENTEROLOGY | Facility: CLINIC | Age: 64
End: 2019-11-05

## 2019-11-05 VITALS — HEIGHT: 62 IN | DIASTOLIC BLOOD PRESSURE: 88 MMHG | BODY MASS INDEX: 64 KG/M2 | SYSTOLIC BLOOD PRESSURE: 134 MMHG

## 2019-11-05 DIAGNOSIS — K50.90 CROHN'S DISEASE WITHOUT COMPLICATION, UNSPECIFIED GASTROINTESTINAL TRACT LOCATION (HCC): Primary | ICD-10-CM

## 2019-11-05 DIAGNOSIS — Z12.11 ENCOUNTER FOR SCREENING FOR MALIGNANT NEOPLASM OF COLON: ICD-10-CM

## 2019-11-05 PROCEDURE — 99214 OFFICE O/P EST MOD 30 MIN: CPT | Performed by: INTERNAL MEDICINE

## 2019-11-05 RX ORDER — SODIUM, POTASSIUM,MAG SULFATES 17.5-3.13G
1 SOLUTION, RECONSTITUTED, ORAL ORAL EVERY 12 HOURS
Qty: 2 BOTTLE | Refills: 0 | Status: ON HOLD | OUTPATIENT
Start: 2019-11-05 | End: 2020-01-08

## 2019-11-05 NOTE — PROGRESS NOTES
PATIENT INFORMATION  Susanne Harper       - 1955    CHIEF COMPLAINT  Chief Complaint   Patient presents with   • Follow-up     1 year follow up on Crohn's        HISTORY OF PRESENT ILLNESS  HPI  She is here in follow up for her Crohns. She is doing well. She is maintained on imuran. bm are 2 times daily, formed, no blood.  Last cls was 5 years ago.      REVIEW OF SYSTEMS  Review of Systems   HENT: Positive for sinus pressure.    Musculoskeletal: Positive for arthralgias, back pain, gait problem, joint swelling, myalgias, neck pain and neck stiffness.   All other systems reviewed and are negative.        ACTIVE PROBLEMS  Patient Active Problem List    Diagnosis   • Incisional hernia, without obstruction or gangrene [K43.2]   • Abdominal pain [R10.9]   • Arthritis [M19.90]   • Crohn's disease (CMS/HCC) [K50.90]   • Gastroesophageal reflux disease [K21.9]   • Hypertension [I10]   • Elevated cholesterol [E78.00]   • Irreducible umbilical hernia [K42.0]   • Sleep disorder [G47.9]         PAST MEDICAL HISTORY  Past Medical History:   Diagnosis Date   • Arthritis    • Colon polyp    • Crohn's disease (CMS/HCC)    • Crohn's disease (CMS/HCC)    • GERD (gastroesophageal reflux disease)    • Glaucoma    • H/O renal failure     post-op   • High cholesterol    • History of transfusion    • Hypertension    • Hypothyroidism    • Respiratory failure, post-operative (CMS/HCC)     on vent x3 days 2015   • Sleep apnea     w/cpap   • Ventral hernia     sched repair   • Vertigo          SURGICAL HISTORY  Past Surgical History:   Procedure Laterality Date   • COLONOSCOPY     • GANGLION CYST EXCISION     • HYSTERECTOMY     • OMENTECTOMY     • TONSILLECTOMY     • UMBILICAL HERNIA REPAIR  2015   • VENTRAL/INCISIONAL HERNIA REPAIR N/A 2016    Procedure: VENTRAL/INCISIONAL HERNIA REPAIR;  Surgeon: Sumit Logan MD;  Location: [facility] OR;  Service:          FAMILY HISTORY  Family History   Problem Relation Age of  Onset   • Arthritis Mother    • Diabetes Maternal Grandmother    • Glaucoma Maternal Grandmother    • Heart disease Maternal Grandfather    • Cancer Maternal Grandfather    • Heart disease Paternal Grandfather    • Breast cancer Neg Hx    • Colon cancer Neg Hx    • Colon polyps Neg Hx          SOCIAL HISTORY  Social History     Occupational History   • Not on file   Tobacco Use   • Smoking status: Never Smoker   • Smokeless tobacco: Never Used   Substance and Sexual Activity   • Alcohol use: No   • Drug use: No   • Sexual activity: Defer       Debilities/Disabilities Identified: None    Emotional Behavior: Appropriate    CURRENT MEDICATIONS    Current Outpatient Medications:   •  amLODIPine (NORVASC) 5 MG tablet, Take 5 mg by mouth Daily., Disp: , Rfl:   •  azaTHIOprine (IMURAN) 50 MG tablet, TAKE FOUR AND ONE-HALF TABLETS BY MOUTH EVERY DAY, Disp: 405 tablet, Rfl: 5  •  B Complex Vitamins (VITAMIN B COMPLEX) capsule capsule, Take 1 capsule by mouth Daily., Disp: , Rfl:   •  Biotin 10 MG tablet, Take 10 mg by mouth Daily., Disp: , Rfl:   •  Calcium Carb-Cholecalciferol (CALCIUM 600 + D PO), Take 2 tablets by mouth 2 (Two) Times a Day., Disp: , Rfl:   •  celecoxib (CeleBREX) 200 MG capsule, Take 200 mg by mouth Daily., Disp: , Rfl:   •  Cholecalciferol (VITAMIN D3) 2000 UNITS capsule, Take 1,000 Units by mouth Daily., Disp: , Rfl:   •  Glucosamine-Chondroitin 750-600 MG tablet, Take 2 tablets by mouth Daily., Disp: , Rfl:   •  hydrochlorothiazide (HYDRODIURIL) 25 MG tablet, , Disp: , Rfl:   •  latanoprost (XALATAN) 0.005 % ophthalmic solution, , Disp: , Rfl:   •  levothyroxine (SYNTHROID, LEVOTHROID) 50 MCG tablet, Take 50 mcg by mouth Daily., Disp: , Rfl:   •  lisinopril-hydrochlorothiazide (PRINZIDE,ZESTORETIC) 20-12.5 MG per tablet, Take 1 tablet by mouth Daily., Disp: , Rfl:   •  methocarbamol (ROBAXIN) 750 MG tablet, , Disp: , Rfl:   •  mometasone (NASONEX) 50 MCG/ACT nasal spray, 2 sprays into each nostril  "Daily., Disp: , Rfl:   •  Multiple Vitamin (MULTI VITAMIN DAILY PO), Take 1 tablet by mouth Daily., Disp: , Rfl:   •  Omega-3 Fatty Acids (FISH OIL) 1000 MG capsule capsule, Take 500 mg by mouth Daily With Breakfast., Disp: , Rfl:   •  omeprazole (PriLOSEC) 20 MG capsule, Take 20 mg by mouth Daily., Disp: , Rfl:   •  pravastatin (PRAVACHOL) 80 MG tablet, , Disp: , Rfl:   •  ranitidine (ZANTAC) 150 MG tablet, Take 150 mg by mouth Every Night., Disp: , Rfl:   •  sertraline (ZOLOFT) 100 MG tablet, , Disp: , Rfl:   •  vitamin C (ASCORBIC ACID) 250 MG tablet, Take 1,000 mg by mouth Daily., Disp: , Rfl:     ALLERGIES  Codeine and Penicillins    VITALS  Vitals:    11/05/19 1450   BP: 134/88   Height: 157.5 cm (62.01\")       LAST RESULTS   Admission on 11/30/2016, Discharged on 12/04/2016   Component Date Value Ref Range Status   • Case Report 11/30/2016    Final                    Value:Surgical Pathology Report                         Case: [id number]                                  Authorizing Provider:  Sumit Logan MD   Collected:           11/30/2016 04:00 PM          Ordering Location:     [facility]   Received:            11/30/2016 05:20 PM                                 OR                                                                           Pathologist:           Marni Montgomery MD                                                     Specimen:    Hernia, Sac                                                                               • Final Diagnosis 11/30/2016    Final                    Value:This result contains rich text formatting which cannot be displayed here.   • Glucose 12/01/2016 119* 65 - 99 mg/dL Final   • BUN 12/01/2016 17  8 - 23 mg/dL Final   • Creatinine 12/01/2016 0.84  0.57 - 1.00 mg/dL Final   • Sodium 12/01/2016 139  136 - 145 mmol/L Final   • Potassium 12/01/2016 4.2  3.5 - 5.2 mmol/L Final   • Chloride 12/01/2016 101  98 - 107 mmol/L Final   • CO2 " 12/01/2016 25.3  22.0 - 29.0 mmol/L Final   • Calcium 12/01/2016 8.9  8.8 - 10.5 mg/dL Final   • eGFR Non African Amer 12/01/2016 69  >60 mL/min/1.73 Final   • BUN/Creatinine Ratio 12/01/2016 20.2  7.0 - 25.0 Final   • Anion Gap 12/01/2016 12.7  mmol/L Final   • WBC 12/01/2016 11.39* 4.80 - 10.80 10*3/mm3 Final   • RBC 12/01/2016 4.39  4.20 - 5.40 10*6/mm3 Final   • Hemoglobin 12/01/2016 13.5  12.0 - 16.0 g/dL Final   • Hematocrit 12/01/2016 41.7  37.0 - 47.0 % Final   • MCV 12/01/2016 95.0  81.0 - 99.0 fL Final   • MCH 12/01/2016 30.8  27.0 - 31.0 pg Final   • MCHC 12/01/2016 32.4  31.0 - 37.0 g/dL Final   • RDW 12/01/2016 13.3  11.5 - 14.5 % Final   • RDW-SD 12/01/2016 46.8  37.0 - 54.0 fl Final   • MPV 12/01/2016 10.7* 7.4 - 10.4 fL Final   • Platelets 12/01/2016 286  140 - 500 10*3/mm3 Final   • Neutrophil % 12/01/2016 73.8* 45.0 - 70.0 % Final   • Lymphocyte % 12/01/2016 12.2* 20.0 - 45.0 % Final   • Monocyte % 12/01/2016 13.1* 3.0 - 8.0 % Final   • Eosinophil % 12/01/2016 0.1  0.0 - 4.0 % Final   • Basophil % 12/01/2016 0.4  0.0 - 2.0 % Final   • Immature Grans % 12/01/2016 0.4  0.0 - 0.5 % Final   • Neutrophils, Absolute 12/01/2016 8.40* 1.50 - 8.30 10*3/mm3 Final   • Lymphocytes, Absolute 12/01/2016 1.39  0.60 - 4.80 10*3/mm3 Final   • Monocytes, Absolute 12/01/2016 1.49* 0.00 - 1.00 10*3/mm3 Final   • Eosinophils, Absolute 12/01/2016 0.01* 0.10 - 0.30 10*3/mm3 Final   • Basophils, Absolute 12/01/2016 0.05  0.00 - 0.20 10*3/mm3 Final   • Immature Grans, Absolute 12/01/2016 0.05* 0.00 - 0.03 10*3/mm3 Final   • nRBC 12/01/2016 0.0  0.0 - 0.0 /100 WBC Final   • Hemoglobin A1C 12/01/2016 5.10  4.80 - 5.60 % Final   • TSH 12/01/2016 1.480  0.270 - 4.200 mIU/mL Final   • Hemoglobin 12/03/2016 11.1* 12.0 - 16.0 g/dL Final   • Hematocrit 12/03/2016 34.6* 37.0 - 47.0 % Final   • Glucose 12/04/2016 104* 65 - 99 mg/dL Final   • BUN 12/04/2016 9  8 - 23 mg/dL Final   • Creatinine 12/04/2016 0.74  0.57 - 1.00 mg/dL Final    • Sodium 12/04/2016 135* 136 - 145 mmol/L Final   • Potassium 12/04/2016 3.7  3.5 - 5.2 mmol/L Final   • Chloride 12/04/2016 97* 98 - 107 mmol/L Final   • CO2 12/04/2016 26.2  22.0 - 29.0 mmol/L Final   • Calcium 12/04/2016 8.4* 8.8 - 10.5 mg/dL Final   • eGFR Non African Amer 12/04/2016 80  >60 mL/min/1.73 Final   • BUN/Creatinine Ratio 12/04/2016 12.2  7.0 - 25.0 Final   • Anion Gap 12/04/2016 11.8  mmol/L Final   • WBC 12/04/2016 6.86  4.80 - 10.80 10*3/mm3 Final   • RBC 12/04/2016 3.50* 4.20 - 5.40 10*6/mm3 Final   • Hemoglobin 12/04/2016 10.8* 12.0 - 16.0 g/dL Final   • Hematocrit 12/04/2016 34.4* 37.0 - 47.0 % Final   • MCV 12/04/2016 98.3  81.0 - 99.0 fL Final   • MCH 12/04/2016 30.9  27.0 - 31.0 pg Final   • MCHC 12/04/2016 31.4  31.0 - 37.0 g/dL Final   • RDW 12/04/2016 12.5  11.5 - 14.5 % Final   • RDW-SD 12/04/2016 45.0  37.0 - 54.0 fl Final   • MPV 12/04/2016 11.6* 7.4 - 10.4 fL Final   • Platelets 12/04/2016 210  140 - 500 10*3/mm3 Final   • Neutrophil % 12/04/2016 43.3* 45.0 - 70.0 % Final   • Lymphocyte % 12/04/2016 34.8  20.0 - 45.0 % Final   • Monocyte % 12/04/2016 15.9* 3.0 - 8.0 % Final   • Eosinophil % 12/04/2016 4.5* 0.0 - 4.0 % Final   • Basophil % 12/04/2016 0.9  0.0 - 2.0 % Final   • Immature Grans % 12/04/2016 0.6* 0.0 - 0.5 % Final   • Neutrophils, Absolute 12/04/2016 2.97  1.50 - 8.30 10*3/mm3 Final   • Lymphocytes, Absolute 12/04/2016 2.39  0.60 - 4.80 10*3/mm3 Final   • Monocytes, Absolute 12/04/2016 1.09* 0.00 - 1.00 10*3/mm3 Final   • Eosinophils, Absolute 12/04/2016 0.31* 0.10 - 0.30 10*3/mm3 Final   • Basophils, Absolute 12/04/2016 0.06  0.00 - 0.20 10*3/mm3 Final   • Immature Grans, Absolute 12/04/2016 0.04* 0.00 - 0.03 10*3/mm3 Final   • nRBC 12/04/2016 0.0  0.0 - 0.0 /100 WBC Final     No results found.    PHYSICAL EXAM  Physical Exam   Constitutional: She is oriented to person, place, and time. She appears well-developed and well-nourished. No distress.   HENT:   Head:  Normocephalic and atraumatic.   Mouth/Throat: Oropharynx is clear and moist.   Eyes: EOM are normal. Pupils are equal, round, and reactive to light.   Neck: Normal range of motion. No tracheal deviation present.   Cardiovascular: Normal rate, regular rhythm, normal heart sounds and intact distal pulses. Exam reveals no gallop and no friction rub.   No murmur heard.  Pulmonary/Chest: Effort normal and breath sounds normal. No stridor. No respiratory distress. She has no wheezes. She has no rales. She exhibits no tenderness.   Abdominal: Soft. Bowel sounds are normal. She exhibits no distension. There is no tenderness. There is no rebound and no guarding.   Musculoskeletal: She exhibits no edema.   Lymphadenopathy:     She has no cervical adenopathy.   Neurological: She is alert and oriented to person, place, and time.   Skin: Skin is warm. She is not diaphoretic.   Psychiatric: She has a normal mood and affect. Her behavior is normal. Judgment and thought content normal.   Nursing note and vitals reviewed.      ASSESSMENT  Diagnoses and all orders for this visit:    Crohn's disease without complication, unspecified gastrointestinal tract location (CMS/HCC)  -     Case Request; Standing  -     Follow Anesthesia Guidelines / Standing Orders; Future  -     Case Request    Encounter for screening for malignant neoplasm of colon  -     Case Request; Standing  -     Follow Anesthesia Guidelines / Standing Orders; Future  -     Case Request    Other orders  -     Implement Anesthesia orders day of procedure.; Standing  -     Obtain informed consent; Standing          PLAN  No Follow-up on file.    Indications, risks and procedure were discussed with the patient, including but not limited to, bleeding, infection, possibility of perforation and possible polypectomy. All of the patient's questions were answered, and signed informed consent was obtained and placed on the chart.    Need labs from pcp office

## 2019-11-18 ENCOUNTER — TRANSCRIBE ORDERS (OUTPATIENT)
Dept: ADMINISTRATIVE | Facility: HOSPITAL | Age: 64
End: 2019-11-18

## 2019-11-18 DIAGNOSIS — Z12.31 VISIT FOR SCREENING MAMMOGRAM: Primary | ICD-10-CM

## 2019-11-19 ENCOUNTER — TRANSCRIBE ORDERS (OUTPATIENT)
Dept: ADMINISTRATIVE | Facility: HOSPITAL | Age: 64
End: 2019-11-19

## 2019-11-19 ENCOUNTER — TELEPHONE (OUTPATIENT)
Dept: GASTROENTEROLOGY | Facility: CLINIC | Age: 64
End: 2019-11-19

## 2019-11-19 DIAGNOSIS — Z12.31 ENCOUNTER FOR SCREENING MAMMOGRAM FOR MALIGNANT NEOPLASM OF BREAST: Primary | ICD-10-CM

## 2019-11-19 NOTE — TELEPHONE ENCOUNTER
Patient wanted to move her colonoscopy out until next year. She has been rescheduled to 1/8/20 to arrive at 7:30am.

## 2019-11-25 ENCOUNTER — HOSPITAL ENCOUNTER (OUTPATIENT)
Dept: MAMMOGRAPHY | Facility: HOSPITAL | Age: 64
Discharge: HOME | End: 2019-11-25
Admitting: PHYSICIAN ASSISTANT

## 2019-11-25 DIAGNOSIS — Z12.31 VISIT FOR SCREENING MAMMOGRAM: ICD-10-CM

## 2019-11-25 PROCEDURE — 77067 SCR MAMMO BI INCL CAD: CPT

## 2019-11-25 PROCEDURE — 77063 BREAST TOMOSYNTHESIS BI: CPT

## 2020-01-07 ENCOUNTER — ANESTHESIA EVENT (OUTPATIENT)
Dept: PERIOP | Facility: HOSPITAL | Age: 65
End: 2020-01-07

## 2020-01-08 ENCOUNTER — HOSPITAL ENCOUNTER (OUTPATIENT)
Facility: HOSPITAL | Age: 65
Setting detail: HOSPITAL OUTPATIENT SURGERY
Discharge: HOME | End: 2020-01-08
Attending: INTERNAL MEDICINE | Admitting: INTERNAL MEDICINE

## 2020-01-08 ENCOUNTER — ANESTHESIA (OUTPATIENT)
Dept: PERIOP | Facility: HOSPITAL | Age: 65
End: 2020-01-08

## 2020-01-08 VITALS
BODY MASS INDEX: 53.92 KG/M2 | TEMPERATURE: 97.8 F | OXYGEN SATURATION: 98 % | HEART RATE: 63 BPM | WEIGHT: 293 LBS | DIASTOLIC BLOOD PRESSURE: 76 MMHG | HEIGHT: 62 IN | RESPIRATION RATE: 22 BRPM | SYSTOLIC BLOOD PRESSURE: 125 MMHG

## 2020-01-08 DIAGNOSIS — K50.90 CROHN'S DISEASE WITHOUT COMPLICATION, UNSPECIFIED GASTROINTESTINAL TRACT LOCATION (HCC): ICD-10-CM

## 2020-01-08 PROCEDURE — 45380 COLONOSCOPY AND BIOPSY: CPT | Performed by: INTERNAL MEDICINE

## 2020-01-08 PROCEDURE — 25010000003 LIDOCAINE 1 % SOLUTION: Performed by: NURSE ANESTHETIST, CERTIFIED REGISTERED

## 2020-01-08 PROCEDURE — 25010000002 PROPOFOL 10 MG/ML EMULSION: Performed by: NURSE ANESTHETIST, CERTIFIED REGISTERED

## 2020-01-08 PROCEDURE — 88305 TISSUE EXAM BY PATHOLOGIST: CPT | Performed by: INTERNAL MEDICINE

## 2020-01-08 RX ORDER — SODIUM CHLORIDE 0.9 % (FLUSH) 0.9 %
10 SYRINGE (ML) INJECTION EVERY 12 HOURS SCHEDULED
Status: DISCONTINUED | OUTPATIENT
Start: 2020-01-08 | End: 2020-01-08 | Stop reason: HOSPADM

## 2020-01-08 RX ORDER — LIDOCAINE HYDROCHLORIDE 10 MG/ML
INJECTION, SOLUTION INFILTRATION; PERINEURAL AS NEEDED
Status: DISCONTINUED | OUTPATIENT
Start: 2020-01-08 | End: 2020-01-08 | Stop reason: SURG

## 2020-01-08 RX ORDER — MAGNESIUM HYDROXIDE 1200 MG/15ML
LIQUID ORAL AS NEEDED
Status: DISCONTINUED | OUTPATIENT
Start: 2020-01-08 | End: 2020-01-08 | Stop reason: HOSPADM

## 2020-01-08 RX ORDER — PROPOFOL 10 MG/ML
VIAL (ML) INTRAVENOUS AS NEEDED
Status: DISCONTINUED | OUTPATIENT
Start: 2020-01-08 | End: 2020-01-08 | Stop reason: SURG

## 2020-01-08 RX ORDER — SODIUM CHLORIDE, SODIUM LACTATE, POTASSIUM CHLORIDE, CALCIUM CHLORIDE 600; 310; 30; 20 MG/100ML; MG/100ML; MG/100ML; MG/100ML
9 INJECTION, SOLUTION INTRAVENOUS CONTINUOUS
Status: DISCONTINUED | OUTPATIENT
Start: 2020-01-08 | End: 2020-01-08 | Stop reason: HOSPADM

## 2020-01-08 RX ORDER — SODIUM CHLORIDE 0.9 % (FLUSH) 0.9 %
10 SYRINGE (ML) INJECTION AS NEEDED
Status: DISCONTINUED | OUTPATIENT
Start: 2020-01-08 | End: 2020-01-08 | Stop reason: HOSPADM

## 2020-01-08 RX ORDER — PROPOFOL 10 MG/ML
VIAL (ML) INTRAVENOUS CONTINUOUS PRN
Status: DISCONTINUED | OUTPATIENT
Start: 2020-01-08 | End: 2020-01-08 | Stop reason: SURG

## 2020-01-08 RX ORDER — SODIUM CHLORIDE 9 MG/ML
40 INJECTION, SOLUTION INTRAVENOUS AS NEEDED
Status: DISCONTINUED | OUTPATIENT
Start: 2020-01-08 | End: 2020-01-08 | Stop reason: HOSPADM

## 2020-01-08 RX ORDER — GLYCOPYRROLATE 0.2 MG/ML
INJECTION INTRAMUSCULAR; INTRAVENOUS AS NEEDED
Status: DISCONTINUED | OUTPATIENT
Start: 2020-01-08 | End: 2020-01-08 | Stop reason: SURG

## 2020-01-08 RX ORDER — DEXMEDETOMIDINE HYDROCHLORIDE 100 UG/ML
INJECTION, SOLUTION INTRAVENOUS AS NEEDED
Status: DISCONTINUED | OUTPATIENT
Start: 2020-01-08 | End: 2020-01-08 | Stop reason: SURG

## 2020-01-08 RX ORDER — LIDOCAINE HYDROCHLORIDE 10 MG/ML
0.5 INJECTION, SOLUTION EPIDURAL; INFILTRATION; INTRACAUDAL; PERINEURAL ONCE AS NEEDED
Status: DISCONTINUED | OUTPATIENT
Start: 2020-01-08 | End: 2020-01-08 | Stop reason: HOSPADM

## 2020-01-08 RX ADMIN — DEXMEDETOMIDINE HYDROCHLORIDE 4 MCG: 100 INJECTION, SOLUTION, CONCENTRATE INTRAVENOUS at 09:00

## 2020-01-08 RX ADMIN — PROPOFOL 40 MG: 10 INJECTION, EMULSION INTRAVENOUS at 09:04

## 2020-01-08 RX ADMIN — PROPOFOL 50 MCG/KG/MIN: 10 INJECTION, EMULSION INTRAVENOUS at 08:50

## 2020-01-08 RX ADMIN — LIDOCAINE HYDROCHLORIDE 50 MG: 10 INJECTION, SOLUTION INFILTRATION; PERINEURAL at 08:49

## 2020-01-08 RX ADMIN — PROPOFOL 40 MG: 10 INJECTION, EMULSION INTRAVENOUS at 09:10

## 2020-01-08 RX ADMIN — GLYCOPYRROLATE 0.1 MG: 0.2 INJECTION INTRAMUSCULAR; INTRAVENOUS at 08:49

## 2020-01-08 RX ADMIN — DEXMEDETOMIDINE HYDROCHLORIDE 4 MCG: 100 INJECTION, SOLUTION, CONCENTRATE INTRAVENOUS at 08:55

## 2020-01-08 RX ADMIN — DEXMEDETOMIDINE HYDROCHLORIDE 4 MCG: 100 INJECTION, SOLUTION, CONCENTRATE INTRAVENOUS at 09:08

## 2020-01-08 RX ADMIN — SODIUM CHLORIDE, POTASSIUM CHLORIDE, SODIUM LACTATE AND CALCIUM CHLORIDE 9 ML/HR: 600; 310; 30; 20 INJECTION, SOLUTION INTRAVENOUS at 08:18

## 2020-01-08 RX ADMIN — DEXMEDETOMIDINE HYDROCHLORIDE 4 MCG: 100 INJECTION, SOLUTION, CONCENTRATE INTRAVENOUS at 08:50

## 2020-01-08 RX ADMIN — SODIUM CHLORIDE, POTASSIUM CHLORIDE, SODIUM LACTATE AND CALCIUM CHLORIDE: 600; 310; 30; 20 INJECTION, SOLUTION INTRAVENOUS at 08:43

## 2020-01-08 RX ADMIN — PROPOFOL 50 MG: 10 INJECTION, EMULSION INTRAVENOUS at 08:50

## 2020-01-08 RX ADMIN — PROPOFOL 50 MG: 10 INJECTION, EMULSION INTRAVENOUS at 08:57

## 2020-01-08 NOTE — ANESTHESIA POSTPROCEDURE EVALUATION
Patient: Susanne Harper    Procedure Summary     Date:  01/08/20 Room / Location:   [facility] /  [facility]    Anesthesia Start:  0843 Anesthesia Stop:  0922    Procedure:  COLONOSCOPY , polypectomy with biopsies (N/A ) Diagnosis:       Crohn's disease without complication, unspecified gastrointestinal tract location (CMS/HCC)      (Crohn's disease without complication, unspecified gastrointestinal tract location (CMS/HCC) [K50.90])    Surgeon:  Shantal Greenwood MD Provider:  Angel Salguero CRNA    Anesthesia Type:  MAC ASA Status:  3          Anesthesia Type: MAC    Vitals  Vitals Value Taken Time   /76 1/8/2020  9:48 AM   Temp 97.8 °F (36.6 °C) 1/8/2020  9:28 AM   Pulse 63 1/8/2020  9:48 AM   Resp 22 1/8/2020  9:48 AM   SpO2 98 % 1/8/2020  9:48 AM           Post Anesthesia Care and Evaluation    Patient location during evaluation: PHASE II  Patient participation: complete - patient participated  Level of consciousness: awake and alert  Pain score: 0  Pain management: adequate  Airway patency: patent  Anesthetic complications: No anesthetic complications  PONV Status: none  Cardiovascular status: acceptable  Respiratory status: acceptable  Hydration status: acceptable

## 2020-01-08 NOTE — OP NOTE
Patient Name:  Susanne Harper  YOB: 1955    Date of Procedure:  1/8/2020    Procedure Performed: Screening colonoscopy     Indications: Screening colonoscopy, history of Crohn's    Pre-op Diagnosis:   Crohn's disease without complication, unspecified gastrointestinal tract location (CMS/HCC) [K50.90]    Post-Op Diagnosis Codes:     * Crohn's disease without complication, unspecified gastrointestinal tract location (CMS/HCC) [K50.90]         Staff:  Surgeon(s):  Shantal Greenwood MD         Consent: Procedure colonoscopy indications, risks and procedure were discussed with the patient, including but not limited to, bleeding, infection, possibility of perforation and possible polypectomy. All of the patient's questions were answered, and signed informed consent was obtained and placed on the chart.      Sedation: Sedation was provided by anesthesia.      Estimated Blood Loss: minimal    Specimens:   ID Type Source Tests Collected by Time   A : terminal ileum biopsy Tissue Small Intestine TISSUE PATHOLOGY EXAM Shantal Greenwood MD 1/8/2020 0904   B : right colon biopsy Tissue Large Intestine, Right / Ascending Colon TISSUE PATHOLOGY EXAM Shantal Greenwood MD 1/8/2020 0905   C : transverse colon  biopsy Tissue Large Intestine, Transverse Colon TISSUE PATHOLOGY EXAM Shantal Greenwood MD 1/8/2020 0911   D : left colon biopsy Tissue Large Intestine, Left / Descending Colon TISSUE PATHOLOGY EXAM Shantal Greenwood MD 1/8/2020 0911   E : rectal polyp x5 Polyp Large Intestine, Rectum TISSUE PATHOLOGY EXAM Shantal Greenwood MD 1/8/2020 0917   F : randum rectal biopsies Tissue Large Intestine, Rectum TISSUE PATHOLOGY EXAM Shantal Greenwood MD 1/8/2020 0920         Description of Procedure:   After excellent sedation a digital rectal examination is performed and a flexible colonoscope was inserted into the rectum passed to the cecum.  The cecum was identified by both the ileocecal valve and the appendiceal orifice.  The overall  bowel preparation was fair.  Upon withdrawal the scope careful examination mucosa was made.  She had some looping in the colon such that external pressure was applied to the abdomen to successfully traverse the scope into the cecal bowl.  Terminal ileum was entered this appeared normal.  The entire examined colonic mucosa was free of any obvious inflammatory changes.  Random biopsies are obtained of the terminal ileum, right colon, transverse colon, left colon and rectal biopsies.  She had a few small rectal polyps 5 total that were about 2 to 3 mm each or sessile removed using forceps.  There was scattered diverticulosis noted.  The scope was then slowly withdrawn to the rectum retroflexed were internal hemorrhoids are noted.  The scope was straightened and withdrawn out the patient with no immediate complications and she tolerated the procedure well.     Withdrawal time: 18 minutes    Quality of bowel preparation: Fair    Findings:   Polyps removed using forceps  Diverticulosis on the left side.  Random biopsies obtained throughout the colon we will await biopsy results.  Repeat colonoscopy likely recommended for 5 years but based on pathology results    Complications: None        Shantal Greenwood MD     Date: 1/8/2020  Time: 9:33 AM

## 2020-01-08 NOTE — ANESTHESIA PREPROCEDURE EVALUATION
Anesthesia Evaluation     Patient summary reviewed and Nursing notes reviewed   no history of anesthetic complications:  NPO Solid Status: > 8 hours  NPO Liquid Status: > 8 hours           Airway   Mallampati: II  TM distance: >3 FB  Neck ROM: full  Possible difficult intubation  Dental      Pulmonary    (+) shortness of breath, sleep apnea on CPAP, decreased breath sounds,   Cardiovascular   Exercise tolerance: poor (<4 METS)    ECG reviewed  Rhythm: regular  Rate: normal    (+) hypertension well controlled less than 2 medications, valvular problems/murmurs murmur, dysrhythmias (pt states she has palpitations ), KERR, hyperlipidemia,     ROS comment: Narrative & Impression     RR Interval= 1071 ms  SD Interval= 160 ms  QRSD Interval= 104 ms  QT Interval= 452 ms  QTc Interval= 437 ms  Heart Rate= 56 ms  P Axis= 36 deg  QRS Axis= 31 deg  T Wave Axis= 37 deg  I: 40 Axis= 29 deg  T: 40 Axis= 23 deg  ST Axis= 19 deg  SINUS RHYTHM  NO SIGNIFICANT CHANGE FROM PREVIOUS ECG  Electronically Signed by:  Luis Glaser ([facility]) 22-Nov-2016 21:46:46  Date and Time of Study: 2016-11-22 16:38:54        Neuro/Psych  (+) headaches (hx of ), weakness (generalized, uses walking aides and wheelchair ), psychiatric history Depression,     GI/Hepatic/Renal/Endo    (+) obesity, morbid obesity, GERD well controlled,  renal disease (post operative renal failure), thyroid problem hypothyroidism    ROS Comment: Crohn's disease (CMS/HCC)    Musculoskeletal     (+) back pain, chronic pain,       ROS comment: bertram shoulder pain R>L  Abdominal   (+) obese,    Substance History      OB/GYN          Other   arthritis,      ROS/Med Hx Other: On ventilator in ICU after ventral hernia repair in 2016                  Anesthesia Plan    ASA 3     MAC     intravenous induction     Anesthetic plan, all risks, benefits, and alternatives have been provided, discussed and informed consent has been obtained with: patient.  Use of blood products discussed with  patient  Consented to blood products.

## 2020-01-08 NOTE — H&P
[facility]  Pre-procedure H&P    Chief Complaint:screening colonoscopy, long standing history of crohns  Susanne Harper is a 64 y.o. female  who is referred by Shantal Greenwood MD for a colonoscopy. She is an Asymptomatic person at high risk:  Ulcerative Colitis or Crohn's Disease who has a history of Crohn's disease.   She has had colonoscopy 5 years ago without abnormalities..    She denies any change in bowel function, melena, hematochezia or unexplained weight loss.    Twelve point ROS performed and all are negative    Past Medical History:   Diagnosis Date   • Arthritis    • Colon polyp    • Crohn's disease (CMS/HCC)    • Crohn's disease (CMS/HCC)    • GERD (gastroesophageal reflux disease)    • Glaucoma    • H/O renal failure     post-op   • High cholesterol    • History of transfusion    • Hypertension    • Hypothyroidism    • Respiratory failure, post-operative (CMS/HCC)     on vent x3 days 12/2015   • Sleep apnea     w/cpap   • Ventral hernia     sched repair   • Vertigo        Past Surgical History:   Procedure Laterality Date   • COLONOSCOPY     • GANGLION CYST EXCISION     • HYSTERECTOMY     • OMENTECTOMY     • TONSILLECTOMY     • UMBILICAL HERNIA REPAIR  12/2015   • VENTRAL/INCISIONAL HERNIA REPAIR N/A 11/30/2016    Procedure: VENTRAL/INCISIONAL HERNIA REPAIR;  Surgeon: Sumit Logan MD;  Location: [facility];  Service:        Family History   Problem Relation Age of Onset   • Arthritis Mother    • Diabetes Maternal Grandmother    • Glaucoma Maternal Grandmother    • Heart disease Maternal Grandfather    • Cancer Maternal Grandfather    • Heart disease Paternal Grandfather    • Breast cancer Neg Hx    • Colon cancer Neg Hx    • Colon polyps Neg Hx        Social History     Socioeconomic History   • Marital status:      Spouse name: Not on file   • Number of children: Not on file   • Years of education: Not on file   • Highest education level: Not on file   Tobacco Use    • Smoking status: Never Smoker   • Smokeless tobacco: Never Used   Substance and Sexual Activity   • Alcohol use: No   • Drug use: No   • Sexual activity: Defer         Current Facility-Administered Medications:   •  lactated ringers infusion, 9 mL/hr, Intravenous, Continuous, Bruno Cameron CRNA, Last Rate: 9 mL/hr at 01/08/20 0818, 9 mL/hr at 01/08/20 0818  •  lidocaine PF 1% (XYLOCAINE) injection 0.5 mL, 0.5 mL, Injection, Once PRN, Bruno Cameron CRNA  •  sodium chloride 0.9 % flush 10 mL, 10 mL, Intravenous, PRN, Bruno Cameron CRNA  •  sodium chloride 0.9 % flush 10 mL, 10 mL, Intravenous, Q12H, Bruno Cameron CRNA  •  sodium chloride 0.9 % infusion 40 mL, 40 mL, Intravenous, PRN, Bruno Cameron CRNA  •  sterile water 1,000 mL with simethicone 40 MG/0.6ML 3 mL mixture, , , PRTimo KHANNA Shiela, MD, 200 mL at 01/08/20 0840  •  sterile water irrigation solution, , , Timo LACEY Shiela, MD, 1,000 mL at 01/08/20 0830  No current facility-administered medications on file prior to encounter.      Current Outpatient Medications on File Prior to Encounter   Medication Sig Dispense Refill   • azaTHIOprine (IMURAN) 50 MG tablet TAKE FOUR AND ONE-HALF TABLETS BY MOUTH EVERY  tablet 5   • B Complex Vitamins (VITAMIN B COMPLEX) capsule capsule Take 1 capsule by mouth Daily.     • Biotin 10 MG tablet Take 10 mg by mouth Daily.     • Calcium Carb-Cholecalciferol (CALCIUM 600 + D PO) Take 2 tablets by mouth 2 (Two) Times a Day.     • latanoprost (XALATAN) 0.005 % ophthalmic solution      • levothyroxine (SYNTHROID, LEVOTHROID) 50 MCG tablet Take 50 mcg by mouth Daily.     • lisinopril-hydrochlorothiazide (PRINZIDE,ZESTORETIC) 20-12.5 MG per tablet Take 1 tablet by mouth 2 (Two) Times a Day Before Meals.     • methocarbamol (ROBAXIN) 750 MG tablet      • mometasone (NASONEX) 50 MCG/ACT nasal spray 2 sprays into each nostril Daily.     • omeprazole (PriLOSEC) 20 MG  capsule Take 20 mg by mouth Daily.     • pravastatin (PRAVACHOL) 80 MG tablet      • sertraline (ZOLOFT) 100 MG tablet      • vitamin C (ASCORBIC ACID) 250 MG tablet Take 1,000 mg by mouth Daily.     • celecoxib (CeleBREX) 200 MG capsule Take 200 mg by mouth 2 (Two) Times a Day.     • Cholecalciferol (VITAMIN D3) 2000 UNITS capsule Take 1,000 Units by mouth Daily.     • Glucosamine-Chondroitin 750-600 MG tablet Take 2 tablets by mouth Daily.     • Multiple Vitamin (MULTI VITAMIN DAILY PO) Take 1 tablet by mouth Daily.     • Omega-3 Fatty Acids (FISH OIL) 1000 MG capsule capsule Take 500 mg by mouth Daily With Breakfast.     • [DISCONTINUED] sodium-potassium-magnesium sulfates (SUPREP BOWEL PREP KIT) 17.5-3.13-1.6 GM/177ML solution oral solution Take 1 bottle by mouth Every 12 (Twelve) Hours. 2 bottle 0       Allergies   Allergen Reactions   • Codeine Nausea And Vomiting   • Penicillins Hives            Vitals:    01/08/20 0802   BP: 138/88   Pulse: 72   Resp: 18   Temp:    SpO2: 94%       Physical Exam:   General: patient awake, alert and cooperative   Eyes: Normal lids and lashes, no scleral icterus   Neck: supple, normal ROM   Skin: warm and dry, not jaundiced   Cardiovascular: regular rhythm and rate, no murmurs auscultated   Pulm: clear to auscultation bilaterally, regular and unlabored   Abdomen: soft, nontender, nondistended; normal bowel sounds   Extremities: no rash or edema   Psychiatric: Normal mood and behavior    Debilities: None         Assessment/Plan       No diagnosis found.    Schedule for colonoscopy.      Indications, risks and procedure were discussed with the patient, including but not limited to, bleeding, infection, possibility of perforation and possible polypectomy. All of the patient's questions were answered, and signed informed consent was obtained and placed on the chart.         Shantal Greenwood MD  [facility]  [unfilled]

## 2020-01-09 LAB
CYTO UR: NORMAL
LAB AP CASE REPORT: NORMAL
PATH REPORT.FINAL DX SPEC: NORMAL
PATH REPORT.GROSS SPEC: NORMAL

## 2020-04-12 ENCOUNTER — HOSPITAL ENCOUNTER (OUTPATIENT)
Facility: HOSPITAL | Age: 65
Setting detail: OBSERVATION
Discharge: HOME | End: 2020-04-14
Attending: EMERGENCY MEDICINE | Admitting: INTERNAL MEDICINE

## 2020-04-12 ENCOUNTER — APPOINTMENT (OUTPATIENT)
Dept: CT IMAGING | Facility: HOSPITAL | Age: 65
End: 2020-04-12

## 2020-04-12 DIAGNOSIS — K43.6 INCARCERATED VENTRAL HERNIA: Primary | ICD-10-CM

## 2020-04-12 DIAGNOSIS — K56.600 PARTIAL SMALL BOWEL OBSTRUCTION (HCC): ICD-10-CM

## 2020-04-12 LAB
ALBUMIN SERPL-MCNC: 4.2 G/DL (ref 3.5–5.2)
ALBUMIN/GLOB SERPL: 1.1 G/DL
ALP SERPL-CCNC: 83 U/L (ref 39–117)
ALT SERPL W P-5'-P-CCNC: 21 U/L (ref 1–33)
ANION GAP SERPL CALCULATED.3IONS-SCNC: 13.2 MMOL/L (ref 5–15)
AST SERPL-CCNC: 33 U/L (ref 1–32)
BASOPHILS # BLD AUTO: 0.06 10*3/MM3 (ref 0–0.2)
BASOPHILS NFR BLD AUTO: 0.6 % (ref 0–1.5)
BILIRUB SERPL-MCNC: 0.7 MG/DL (ref 0.2–1.2)
BUN BLD-MCNC: 19 MG/DL (ref 8–23)
BUN/CREAT SERPL: 21.3 (ref 7–25)
CALCIUM SPEC-SCNC: 9.5 MG/DL (ref 8.6–10.5)
CHLORIDE SERPL-SCNC: 95 MMOL/L (ref 98–107)
CO2 SERPL-SCNC: 27.8 MMOL/L (ref 22–29)
CREAT BLD-MCNC: 0.89 MG/DL (ref 0.57–1)
DEPRECATED RDW RBC AUTO: 48.2 FL (ref 37–54)
EOSINOPHIL # BLD AUTO: 0.04 10*3/MM3 (ref 0–0.4)
EOSINOPHIL NFR BLD AUTO: 0.4 % (ref 0.3–6.2)
ERYTHROCYTE [DISTWIDTH] IN BLOOD BY AUTOMATED COUNT: 14 % (ref 12.3–15.4)
GFR SERPL CREATININE-BSD FRML MDRD: 64 ML/MIN/1.73
GLOBULIN UR ELPH-MCNC: 4 GM/DL
GLUCOSE BLD-MCNC: 157 MG/DL (ref 65–99)
HCT VFR BLD AUTO: 47.6 % (ref 34–46.6)
HGB BLD-MCNC: 15.5 G/DL (ref 12–15.9)
IMM GRANULOCYTES # BLD AUTO: 0.06 10*3/MM3 (ref 0–0.05)
IMM GRANULOCYTES NFR BLD AUTO: 0.6 % (ref 0–0.5)
LIPASE SERPL-CCNC: 24 U/L (ref 13–60)
LYMPHOCYTES # BLD AUTO: 0.8 10*3/MM3 (ref 0.7–3.1)
LYMPHOCYTES NFR BLD AUTO: 7.8 % (ref 19.6–45.3)
MCH RBC QN AUTO: 30.6 PG (ref 26.6–33)
MCHC RBC AUTO-ENTMCNC: 32.6 G/DL (ref 31.5–35.7)
MCV RBC AUTO: 93.9 FL (ref 79–97)
MONOCYTES # BLD AUTO: 0.61 10*3/MM3 (ref 0.1–0.9)
MONOCYTES NFR BLD AUTO: 5.9 % (ref 5–12)
NEUTROPHILS # BLD AUTO: 8.75 10*3/MM3 (ref 1.7–7)
NEUTROPHILS NFR BLD AUTO: 84.7 % (ref 42.7–76)
NRBC BLD AUTO-RTO: 0 /100 WBC (ref 0–0.2)
PLATELET # BLD AUTO: 304 10*3/MM3 (ref 140–450)
PMV BLD AUTO: 10.4 FL (ref 6–12)
POTASSIUM BLD-SCNC: 3.7 MMOL/L (ref 3.5–5.2)
PROT SERPL-MCNC: 8.2 G/DL (ref 6–8.5)
RBC # BLD AUTO: 5.07 10*6/MM3 (ref 3.77–5.28)
SODIUM BLD-SCNC: 136 MMOL/L (ref 136–145)
WBC NRBC COR # BLD: 10.32 10*3/MM3 (ref 3.4–10.8)

## 2020-04-12 PROCEDURE — 83690 ASSAY OF LIPASE: CPT | Performed by: EMERGENCY MEDICINE

## 2020-04-12 PROCEDURE — 25010000002 FENTANYL CITRATE (PF) 100 MCG/2ML SOLUTION: Performed by: EMERGENCY MEDICINE

## 2020-04-12 PROCEDURE — 85025 COMPLETE CBC W/AUTO DIFF WBC: CPT | Performed by: EMERGENCY MEDICINE

## 2020-04-12 PROCEDURE — 99284 EMERGENCY DEPT VISIT MOD MDM: CPT | Performed by: EMERGENCY MEDICINE

## 2020-04-12 PROCEDURE — 25010000002 ONDANSETRON PER 1 MG: Performed by: EMERGENCY MEDICINE

## 2020-04-12 PROCEDURE — 96374 THER/PROPH/DIAG INJ IV PUSH: CPT

## 2020-04-12 PROCEDURE — 83036 HEMOGLOBIN GLYCOSYLATED A1C: CPT | Performed by: NURSE PRACTITIONER

## 2020-04-12 PROCEDURE — 80053 COMPREHEN METABOLIC PANEL: CPT | Performed by: EMERGENCY MEDICINE

## 2020-04-12 PROCEDURE — 96375 TX/PRO/DX INJ NEW DRUG ADDON: CPT

## 2020-04-12 PROCEDURE — 84443 ASSAY THYROID STIM HORMONE: CPT | Performed by: NURSE PRACTITIONER

## 2020-04-12 PROCEDURE — 99284 EMERGENCY DEPT VISIT MOD MDM: CPT

## 2020-04-12 RX ORDER — ONDANSETRON 2 MG/ML
8 INJECTION INTRAMUSCULAR; INTRAVENOUS ONCE
Status: COMPLETED | OUTPATIENT
Start: 2020-04-12 | End: 2020-04-12

## 2020-04-12 RX ORDER — SODIUM CHLORIDE 9 MG/ML
250 INJECTION, SOLUTION INTRAVENOUS CONTINUOUS
Status: DISCONTINUED | OUTPATIENT
Start: 2020-04-12 | End: 2020-04-13

## 2020-04-12 RX ORDER — SODIUM CHLORIDE 0.9 % (FLUSH) 0.9 %
10 SYRINGE (ML) INJECTION AS NEEDED
Status: DISCONTINUED | OUTPATIENT
Start: 2020-04-12 | End: 2020-04-14 | Stop reason: HOSPADM

## 2020-04-12 RX ORDER — FENTANYL CITRATE 50 UG/ML
75 INJECTION, SOLUTION INTRAMUSCULAR; INTRAVENOUS ONCE
Status: COMPLETED | OUTPATIENT
Start: 2020-04-12 | End: 2020-04-12

## 2020-04-12 RX ADMIN — FENTANYL CITRATE 75 MCG: 50 INJECTION, SOLUTION INTRAMUSCULAR; INTRAVENOUS at 23:20

## 2020-04-12 RX ADMIN — SODIUM CHLORIDE 250 ML/HR: 9 INJECTION, SOLUTION INTRAVENOUS at 23:19

## 2020-04-12 RX ADMIN — ONDANSETRON 8 MG: 2 INJECTION, SOLUTION INTRAMUSCULAR; INTRAVENOUS at 23:19

## 2020-04-13 ENCOUNTER — APPOINTMENT (OUTPATIENT)
Dept: CT IMAGING | Facility: HOSPITAL | Age: 65
End: 2020-04-13

## 2020-04-13 PROBLEM — K43.6 INCARCERATED VENTRAL HERNIA: Status: ACTIVE | Noted: 2020-04-13

## 2020-04-13 LAB
BACTERIA UR QL AUTO: ABNORMAL /HPF
BILIRUB UR QL STRIP: NEGATIVE
CLARITY UR: CLEAR
COLOR UR: YELLOW
GLUCOSE UR STRIP-MCNC: NEGATIVE MG/DL
HBA1C MFR BLD: 5.8 % (ref 4.8–5.6)
HGB UR QL STRIP.AUTO: ABNORMAL
HYALINE CASTS UR QL AUTO: ABNORMAL /LPF
INR PPP: 1.04 (ref 0.9–1.1)
KETONES UR QL STRIP: NEGATIVE
LEUKOCYTE ESTERASE UR QL STRIP.AUTO: NEGATIVE
NITRITE UR QL STRIP: NEGATIVE
PH UR STRIP.AUTO: 7.5 [PH] (ref 4.5–8)
PROT UR QL STRIP: NEGATIVE
PROTHROMBIN TIME: 13.3 SECONDS (ref 12.1–15)
RBC # UR: ABNORMAL /HPF
REF LAB TEST METHOD: ABNORMAL
SP GR UR STRIP: 1.02 (ref 1–1.03)
SQUAMOUS #/AREA URNS HPF: ABNORMAL /HPF
TSH SERPL DL<=0.05 MIU/L-ACNC: 3.24 UIU/ML (ref 0.27–4.2)
UROBILINOGEN UR QL STRIP: ABNORMAL
WBC UR QL AUTO: ABNORMAL /HPF

## 2020-04-13 PROCEDURE — 99220 PR INITIAL OBSERVATION CARE/DAY 70 MINUTES: CPT | Performed by: NURSE PRACTITIONER

## 2020-04-13 PROCEDURE — 99221 1ST HOSP IP/OBS SF/LOW 40: CPT | Performed by: INTERNAL MEDICINE

## 2020-04-13 PROCEDURE — 0 DIATRIZOATE MEGLUMINE & SODIUM PER 1 ML: Performed by: EMERGENCY MEDICINE

## 2020-04-13 PROCEDURE — 74177 CT ABD & PELVIS W/CONTRAST: CPT

## 2020-04-13 PROCEDURE — G0378 HOSPITAL OBSERVATION PER HR: HCPCS

## 2020-04-13 PROCEDURE — 25010000002 HYDROMORPHONE PER 4 MG: Performed by: NURSE PRACTITIONER

## 2020-04-13 PROCEDURE — 25010000002 ONDANSETRON PER 1 MG: Performed by: EMERGENCY MEDICINE

## 2020-04-13 PROCEDURE — 99221 1ST HOSP IP/OBS SF/LOW 40: CPT | Performed by: SURGERY

## 2020-04-13 PROCEDURE — 85610 PROTHROMBIN TIME: CPT | Performed by: NURSE PRACTITIONER

## 2020-04-13 PROCEDURE — 96361 HYDRATE IV INFUSION ADD-ON: CPT

## 2020-04-13 PROCEDURE — 93005 ELECTROCARDIOGRAM TRACING: CPT | Performed by: NURSE PRACTITIONER

## 2020-04-13 PROCEDURE — 81001 URINALYSIS AUTO W/SCOPE: CPT | Performed by: EMERGENCY MEDICINE

## 2020-04-13 PROCEDURE — 96376 TX/PRO/DX INJ SAME DRUG ADON: CPT

## 2020-04-13 PROCEDURE — 96375 TX/PRO/DX INJ NEW DRUG ADDON: CPT

## 2020-04-13 PROCEDURE — 93010 ELECTROCARDIOGRAM REPORT: CPT | Performed by: INTERNAL MEDICINE

## 2020-04-13 PROCEDURE — 0 IOPAMIDOL PER 1 ML: Performed by: EMERGENCY MEDICINE

## 2020-04-13 PROCEDURE — 25010000002 ONDANSETRON PER 1 MG: Performed by: NURSE PRACTITIONER

## 2020-04-13 RX ORDER — SODIUM CHLORIDE, SODIUM LACTATE, POTASSIUM CHLORIDE, CALCIUM CHLORIDE 600; 310; 30; 20 MG/100ML; MG/100ML; MG/100ML; MG/100ML
75 INJECTION, SOLUTION INTRAVENOUS CONTINUOUS
Status: DISCONTINUED | OUTPATIENT
Start: 2020-04-13 | End: 2020-04-14

## 2020-04-13 RX ORDER — HYDROMORPHONE HCL 110MG/55ML
0.5 PATIENT CONTROLLED ANALGESIA SYRINGE INTRAVENOUS
Status: DISCONTINUED | OUTPATIENT
Start: 2020-04-13 | End: 2020-04-14 | Stop reason: HOSPADM

## 2020-04-13 RX ORDER — ONDANSETRON 2 MG/ML
INJECTION INTRAMUSCULAR; INTRAVENOUS
Status: DISPENSED
Start: 2020-04-13 | End: 2020-04-13

## 2020-04-13 RX ORDER — HYDROCHLOROTHIAZIDE 25 MG/1
25 TABLET ORAL DAILY
COMMUNITY
End: 2020-04-14 | Stop reason: HOSPADM

## 2020-04-13 RX ORDER — ONDANSETRON 2 MG/ML
4 INJECTION INTRAMUSCULAR; INTRAVENOUS ONCE
Status: COMPLETED | OUTPATIENT
Start: 2020-04-13 | End: 2020-04-13

## 2020-04-13 RX ORDER — LIDOCAINE HYDROCHLORIDE 20 MG/ML
SOLUTION OROPHARYNGEAL
Status: COMPLETED
Start: 2020-04-13 | End: 2020-04-13

## 2020-04-13 RX ORDER — LEVOTHYROXINE SODIUM ANHYDROUS 100 UG/5ML
25 INJECTION, POWDER, LYOPHILIZED, FOR SOLUTION INTRAVENOUS
Status: DISCONTINUED | OUTPATIENT
Start: 2020-04-13 | End: 2020-04-14

## 2020-04-13 RX ORDER — ONDANSETRON 4 MG/1
4 TABLET, FILM COATED ORAL EVERY 6 HOURS PRN
Status: DISCONTINUED | OUTPATIENT
Start: 2020-04-13 | End: 2020-04-13

## 2020-04-13 RX ORDER — ONDANSETRON 4 MG/1
4 TABLET, FILM COATED ORAL EVERY 6 HOURS PRN
Status: DISCONTINUED | OUTPATIENT
Start: 2020-04-13 | End: 2020-04-14 | Stop reason: HOSPADM

## 2020-04-13 RX ORDER — SODIUM CHLORIDE 0.9 % (FLUSH) 0.9 %
10 SYRINGE (ML) INJECTION EVERY 12 HOURS SCHEDULED
Status: DISCONTINUED | OUTPATIENT
Start: 2020-04-13 | End: 2020-04-14 | Stop reason: HOSPADM

## 2020-04-13 RX ORDER — SODIUM CHLORIDE 9 MG/ML
40 INJECTION, SOLUTION INTRAVENOUS AS NEEDED
Status: DISCONTINUED | OUTPATIENT
Start: 2020-04-13 | End: 2020-04-14 | Stop reason: HOSPADM

## 2020-04-13 RX ORDER — LATANOPROST 50 UG/ML
1 SOLUTION/ DROPS OPHTHALMIC NIGHTLY
Status: DISCONTINUED | OUTPATIENT
Start: 2020-04-13 | End: 2020-04-14 | Stop reason: HOSPADM

## 2020-04-13 RX ORDER — FAMOTIDINE 10 MG/ML
20 INJECTION, SOLUTION INTRAVENOUS EVERY 12 HOURS SCHEDULED
Status: DISCONTINUED | OUTPATIENT
Start: 2020-04-13 | End: 2020-04-14 | Stop reason: HOSPADM

## 2020-04-13 RX ORDER — FLUTICASONE PROPIONATE 50 MCG
2 SPRAY, SUSPENSION (ML) NASAL DAILY
Status: DISCONTINUED | OUTPATIENT
Start: 2020-04-13 | End: 2020-04-14 | Stop reason: HOSPADM

## 2020-04-13 RX ORDER — FLUTICASONE PROPIONATE 50 MCG
2 SPRAY, SUSPENSION (ML) NASAL DAILY
COMMUNITY

## 2020-04-13 RX ORDER — NALOXONE HCL 0.4 MG/ML
0.4 VIAL (ML) INJECTION
Status: DISCONTINUED | OUTPATIENT
Start: 2020-04-13 | End: 2020-04-14 | Stop reason: HOSPADM

## 2020-04-13 RX ORDER — ONDANSETRON 2 MG/ML
4 INJECTION INTRAMUSCULAR; INTRAVENOUS EVERY 6 HOURS PRN
Status: DISCONTINUED | OUTPATIENT
Start: 2020-04-13 | End: 2020-04-13

## 2020-04-13 RX ORDER — LATANOPROST 50 UG/ML
1 SOLUTION/ DROPS OPHTHALMIC DAILY
Status: DISCONTINUED | OUTPATIENT
Start: 2020-04-13 | End: 2020-04-13

## 2020-04-13 RX ORDER — SODIUM CHLORIDE 0.9 % (FLUSH) 0.9 %
10 SYRINGE (ML) INJECTION AS NEEDED
Status: DISCONTINUED | OUTPATIENT
Start: 2020-04-13 | End: 2020-04-14 | Stop reason: HOSPADM

## 2020-04-13 RX ORDER — ONDANSETRON 2 MG/ML
8 INJECTION INTRAMUSCULAR; INTRAVENOUS EVERY 6 HOURS PRN
Status: DISCONTINUED | OUTPATIENT
Start: 2020-04-13 | End: 2020-04-14 | Stop reason: HOSPADM

## 2020-04-13 RX ADMIN — FAMOTIDINE 20 MG: 10 INJECTION INTRAVENOUS at 08:31

## 2020-04-13 RX ADMIN — SODIUM CHLORIDE, POTASSIUM CHLORIDE, SODIUM LACTATE AND CALCIUM CHLORIDE 150 ML/HR: 600; 310; 30; 20 INJECTION, SOLUTION INTRAVENOUS at 10:54

## 2020-04-13 RX ADMIN — LIDOCAINE HYDROCHLORIDE: 20 SOLUTION ORAL; TOPICAL at 01:55

## 2020-04-13 RX ADMIN — FLUTICASONE PROPIONATE 2 SPRAY: 50 SPRAY, METERED NASAL at 08:32

## 2020-04-13 RX ADMIN — SODIUM CHLORIDE, POTASSIUM CHLORIDE, SODIUM LACTATE AND CALCIUM CHLORIDE 150 ML/HR: 600; 310; 30; 20 INJECTION, SOLUTION INTRAVENOUS at 03:55

## 2020-04-13 RX ADMIN — DIATRIZOATE MEGLUMINE AND DIATRIZOATE SODIUM 30 ML: 600; 100 SOLUTION ORAL; RECTAL at 01:00

## 2020-04-13 RX ADMIN — LATANOPROST 1 DROP: 50 SOLUTION OPHTHALMIC at 20:46

## 2020-04-13 RX ADMIN — IOPAMIDOL 100 ML: 755 INJECTION, SOLUTION INTRAVENOUS at 01:00

## 2020-04-13 RX ADMIN — SODIUM CHLORIDE, PRESERVATIVE FREE 10 ML: 5 INJECTION INTRAVENOUS at 20:46

## 2020-04-13 RX ADMIN — HYDROMORPHONE HYDROCHLORIDE 0.5 MG: 2 INJECTION, SOLUTION INTRAMUSCULAR; INTRAVENOUS; SUBCUTANEOUS at 02:48

## 2020-04-13 RX ADMIN — ONDANSETRON 4 MG: 2 INJECTION INTRAMUSCULAR; INTRAVENOUS at 02:48

## 2020-04-13 RX ADMIN — ONDANSETRON 4 MG: 2 INJECTION, SOLUTION INTRAMUSCULAR; INTRAVENOUS at 01:12

## 2020-04-13 RX ADMIN — LEVOTHYROXINE SODIUM ANHYDROUS 25 MCG: 100 INJECTION, POWDER, LYOPHILIZED, FOR SOLUTION INTRAVENOUS at 06:29

## 2020-04-13 RX ADMIN — FAMOTIDINE 20 MG: 10 INJECTION INTRAVENOUS at 20:46

## 2020-04-13 NOTE — PLAN OF CARE
Discharge Planning Assessment   Lizabeth Wright     Patient Name: Susanne Harper  MRN: [id number]  Today's Date: 4/13/2020    Admit Date: 4/12/2020    Discharge Needs Assessment       Row Name 04/13/20 1021       Living Environment    Lives With  spouse    Name(s) of Who Lives With Patient  Daniel Harper,  and Mick Brown, son    Current Living Arrangements  home/apartment/condo single story house with three steps to gain entry    Duration at Residence  20 years    Potentially Unsafe Housing Conditions  -- none    Primary Care Provided by  self    Provides Primary Care For  no one    Caregiving Concerns  none voiced    Family Caregiver if Needed  spouse;child(brenda), adult    Family Caregiver Names  Daniel and Mick    Quality of Family Relationships  unable to assess    Able to Return to Prior Arrangements  yes    Living Arrangement Comments  pt states she lives with her  Daniel and adult son Mick in a single story house with three steps to gain entry       Resource/Environmental Concerns    Resource/Environmental Concerns  none    Transportation Concerns  -- none       Transition Planning    Patient/Family Anticipates Transition to  home with family    Patient/Family Anticipated Services at Transition  none    Transportation Anticipated  car, drives self;family or friend will provide pt states she still drives but her  or son will be able to provide ride home at discharge.       Discharge Needs Assessment    Readmission Within the Last 30 Days  no previous admission in last 30 days    Concerns to be Addressed  no discharge needs identified;denies needs/concerns at this time    Concerns Comments  none voiced    Equipment Currently Used at Home  bipap/cpap;crutches;wheelchair;walker, rolling;shower chair CPAP through [employer]    Anticipated Changes Related to Illness  none    Equipment Needed After Discharge  none    Outpatient/Agency/Support Group Needs  -- na    Discharge Facility/Level of Care Needs   -- na    Provided Post Acute Provider List?  Refused    Refused Provider List Comment  pt declines community resources at this time.    Patient's Choice of Community Agency(s)  no preference    Current Discharge Risk  -- none    Discharge Coordination/Progress  per pt she states she plans on returning home at discharge with her  and adult son to help as needed, no needs voiced by pt at this time.            Discharge Plan       Row Name 04/13/20 1028       Plan    Plan  Home    Patient/Family in Agreement with Plan  yes    Plan Comments  Into room and introduced self and role of CM. Discussed discharge plans with patient at bedside. Patient confirms that the info on her face sheet is correct and that she see's Yolanda Marshall as PCP. She states that she uses [facility] pharmacy in [place] and has no problem picking up her meds or paying for them. She also states that she does not have a living will and declines information regarding one. Patient states that she lives with  her  Daniel and adult son Mick in a one story home with three steps to gain entry and has no problem maneuvering the steps or within the home. She states that she is independent with her ADL's and still drives but her  or son can provide ride home at discharge. She also states that she currently uses crutches, rolling walker, wheelchair, shower chair and has a CPAP through [employer] that she uses at night and does not anticipate needing any other equipment at discharge. Patient states she has used home health in the past but is uncertain of which agency and does not think she will need one at discharge. Offered community resources but patient declines the need for them at this time. Patient states she plans on returning home at discharge with her  and son to help if needed, no needs voiced by patient at this time. Name and number placed on white board in room. CM will continue to follow for needs.            Destination         Coordination has not been started for this encounter.          Durable Medical Equipment        Coordination has not been started for this encounter.          Dialysis/Infusion        Coordination has not been started for this encounter.          Home Medical Care        Coordination has not been started for this encounter.          Therapy        Coordination has not been started for this encounter.          Community Resources        Coordination has not been started for this encounter.              Demographic Summary       Row Name 04/13/20 1021       General Information    Admission Type  inpatient    Arrived From  home    Referral Source  admission list    Reason for Consult  discharge planning    Preferred Language  English     Used During This Interaction  no       Contact Information    Permission Granted to Share Info With              Functional Status    No documentation.         Psychosocial    No documentation.         Abuse/Neglect    No documentation.         Legal    No documentation.         Substance Abuse    No documentation.         Patient Forms    No documentation.             Yojana Vidal RN

## 2020-04-13 NOTE — H&P
"[facility] HOSPITALIST     Yolanda Marshall PA    CHIEF COMPLAINT: Nausea, vomiting, abdominal pain    HISTORY OF PRESENT ILLNESS:  The patient is a 64-year-old female that presented through the emergency department secondary to right mid abdominal pain that she notes has gone on throughout the day but became acutely worsened at approximately 6 PM tonight and was associated with multiple episodes of nausea, vomiting that did not improve her symptoms.  She notes since placement of NG tube in ER she has still vomited and remains nauseated with moderate abdominal pain.  She further reports 2 days of bloody stools with a history of Crohn's disease and hemorrhoids and feels that there was a significant amount of blood in the commode today with BM at 2 PM.  She reports past \"episodes\" of the same area abdominal pain that resolved with soft food diet or change to liquids for a period of time.  She does not feel that she has passed flatus since 2 PM.    She has a known history of Crohn's disease (previous patient of Dr. Greenwood, now Dr. Cazares), diverticulosis, GERD, glaucoma, hypothyroidism, hypertension, ventral hernia with repair, dyslipidemia, chronic ankle and foot swelling.     Surgeries include:   Laparascopic repair incarcerated umbilical hernia 12/18/2015 per Dr. Tang  Ex lap/partial omentectomy 2/2 postoperative intraperitoneal omental bleed 12/19/2015 per Dr. Tang  Open incisional hernia repair per Dr. Logan 11/30/2016    She otherwise denies f/c/headache/rhinorrhea/nasal congestion/lightheadedness/syncopal sensation/cough/soa/diarrhea/chest pain/recent illness/sick exposures/change in bladder habits/no recent significant weight change/bloody emesis/change in medications or any other new concerns.    Past Medical History:   Diagnosis Date   • Arthritis    • Colon polyp    • Crohn's disease (CMS/HCC)    • Crohn's disease (CMS/HCC)    • GERD (gastroesophageal reflux disease)    • Glaucoma    "   • H/O renal failure     post-op   • High cholesterol    • History of transfusion    • Hypertension    • Hypothyroidism    • Respiratory failure, post-operative (CMS/HCC)     on vent x3 days 12/2015   • Sleep apnea     w/cpap   • Ventral hernia     sched repair   • Vertigo      Past Surgical History:   Procedure Laterality Date   • COLONOSCOPY     • COLONOSCOPY N/A 1/8/2020    Procedure: COLONOSCOPY , polypectomy with biopsies;  Surgeon: Shantal Greenwood MD;  Location: [facility] OR;  Service: Gastroenterology   • GANGLION CYST EXCISION     • HYSTERECTOMY     • OMENTECTOMY     • TONSILLECTOMY     • UMBILICAL HERNIA REPAIR  12/2015   • VENTRAL/INCISIONAL HERNIA REPAIR N/A 11/30/2016    Procedure: VENTRAL/INCISIONAL HERNIA REPAIR;  Surgeon: Sumit Logan MD;  Location: [facility] OR;  Service:      Family History   Problem Relation Age of Onset   • Arthritis Mother    • Diabetes Maternal Grandmother    • Glaucoma Maternal Grandmother    • Heart disease Maternal Grandfather    • Cancer Maternal Grandfather    • Heart disease Paternal Grandfather    • Breast cancer Neg Hx    • Colon cancer Neg Hx    • Colon polyps Neg Hx      Social History     Tobacco Use   • Smoking status: Never Smoker   • Smokeless tobacco: Never Used   Substance Use Topics   • Alcohol use: No   • Drug use: No     Medications Prior to Admission   Medication Sig Dispense Refill Last Dose   • azaTHIOprine (IMURAN) 50 MG tablet TAKE FOUR AND ONE-HALF TABLETS BY MOUTH EVERY  tablet 5 4/12/2020 at 0800   • B Complex Vitamins (VITAMIN B COMPLEX) capsule capsule Take 1 capsule by mouth Daily.   4/12/2020 at 0800   • Biotin 10 MG tablet Take 10 mg by mouth Daily.   4/12/2020 at 0800   • Calcium Carb-Cholecalciferol (CALCIUM 600 + D PO) Take 2 tablets by mouth 2 (Two) Times a Day.   4/12/2020 at 0800   • celecoxib (CeleBREX) 200 MG capsule Take 200 mg by mouth 2 (Two) Times a Day.   4/12/2020 at 0800   • Cholecalciferol (VITAMIN D3) 2000 UNITS capsule  "Take 1,000 Units by mouth Daily.   4/12/2020 at 0800   • fluticasone (FLONASE) 50 MCG/ACT nasal spray 2 sprays into the nostril(s) as directed by provider Daily.      • Glucosamine-Chondroitin 750-600 MG tablet Take 2 tablets by mouth Daily.   4/12/2020 at 0800   • hydroCHLOROthiazide (HYDRODIURIL) 25 MG tablet Take 25 mg by mouth Daily.   4/12/2020 at Unknown time   • latanoprost (XALATAN) 0.005 % ophthalmic solution    4/12/2020 at 2000   • levothyroxine (SYNTHROID, LEVOTHROID) 50 MCG tablet Take 50 mcg by mouth Daily.   4/12/2020 at 0800   • lisinopril-hydrochlorothiazide (PRINZIDE,ZESTORETIC) 20-12.5 MG per tablet Take 1 tablet by mouth 2 (Two) Times a Day Before Meals.   4/12/2020 at 0800   • methocarbamol (ROBAXIN) 750 MG tablet Take 750 mg by mouth 2 (Two) Times a Day. One in morning and one at night   4/12/2020 at 0800   • Multiple Vitamin (MULTI VITAMIN DAILY PO) Take 1 tablet by mouth Daily.   4/12/2020 at 0800   • Omega-3 Fatty Acids (FISH OIL) 1000 MG capsule capsule Take 500 mg by mouth Daily With Breakfast.   4/12/2020 at 0800   • omeprazole (PriLOSEC) 20 MG capsule Take 20 mg by mouth Daily.   4/12/2020 at 0800   • pravastatin (PRAVACHOL) 80 MG tablet    4/11/2020 at 2100   • sertraline (ZOLOFT) 100 MG tablet Take 100 mg by mouth Every Night.   4/11/2020 at 2100   • vitamin C (ASCORBIC ACID) 250 MG tablet Take 1,000 mg by mouth Daily.   4/12/2020 at 0800     Allergies:  Codeine and Penicillins      There is no immunization history on file for this patient.    REVIEW OF SYSTEMS:  Please see the above history of present illness for pertinent positives and negatives.  The remainder of the patient's systems have been reviewed and are negative.    Vital Signs  Temp:  [98.1 °F (36.7 °C)-98.2 °F (36.8 °C)] 98.2 °F (36.8 °C)  Heart Rate:  [71-75] 75  Resp:  [18] 18  BP: (123-154)/(73-76) 123/73   Body mass index is 69.48 kg/m².    Flowsheet Rows      First Filed Value   Admission Height  157.5 cm (62\") " Documented at 04/12/2020 2243   Admission Weight  (!) 172 kg (378 lb 3.2 oz) Documented at 04/12/2020 2243           Physical Exam   Constitutional: She is oriented to person, place, and time. She appears well-nourished.   Super morbidly obese   HENT:   Head: Normocephalic and atraumatic.   Eyes: Pupils are equal, round, and reactive to light. EOM are normal.   Cardiovascular: Normal rate and regular rhythm.   Pulmonary/Chest: Effort normal and breath sounds normal. No stridor. No respiratory distress. She has no wheezes.   Abdominal: Soft.   Bowel sounds present all quadrants, hyperactive right lower quadrant  Approximate 5 cm diameter protrusion right mid abdomen, easily reducible, painful to touch   Musculoskeletal: She exhibits no edema.   Neurological: She is alert and oriented to person, place, and time.   Skin: Skin is warm and dry. No erythema.   Fingers with excoriation to skin surrounding nailbeds   Psychiatric: She has a normal mood and affect. Her behavior is normal.   Vitals reviewed.    Emotional Behavior:    Judgement and Insight: Good   Mental Status:  Alertness alert   Memory: Good   Mood and Affect:         Depression none               Anxiety none    Debilities:   Physical Weakness none   Handicaps morbid obesity   Disabilities none   Agitation none     Results Review:    I reviewed the patient's new clinical results.  Lab Results (most recent)     Procedure Component Value Units Date/Time    Urinalysis, Microscopic Only - Urine, Clean Catch [[id number]]  (Abnormal) Collected:  04/13/20 0006    Specimen:  Urine, Clean Catch Updated:  04/13/20 0014     RBC, UA 6-12 /HPF      WBC, UA 3-5 /HPF      Bacteria, UA 1+ /HPF      Squamous Epithelial Cells, UA 3-6 /HPF      Hyaline Casts, UA None Seen /LPF      Methodology Manual Light Microscopy    Urinalysis With Microscopic If Indicated (No Culture) - Urine, Clean Catch [[id number]]  (Abnormal) Collected:  04/13/20 0006    Specimen:  Urine, Clean Catch  Updated:  04/13/20 0014     Color, UA Yellow     Appearance, UA Clear     pH, UA 7.5     Specific Gravity, UA 1.025     Glucose, UA Negative     Ketones, UA Negative     Bilirubin, UA Negative     Blood, UA Trace     Protein, UA Negative     Leuk Esterase, UA Negative     Nitrite, UA Negative     Urobilinogen, UA 0.2 E.U./dL    Comprehensive Metabolic Panel [[id number]]  (Abnormal) Collected:  04/12/20 2321    Specimen:  Blood Updated:  04/12/20 2343     Glucose 157 mg/dL      BUN 19 mg/dL      Creatinine 0.89 mg/dL      Sodium 136 mmol/L      Potassium 3.7 mmol/L      Chloride 95 mmol/L      CO2 27.8 mmol/L      Calcium 9.5 mg/dL      Total Protein 8.2 g/dL      Albumin 4.20 g/dL      ALT (SGPT) 21 U/L      AST (SGOT) 33 U/L      Alkaline Phosphatase 83 U/L      Total Bilirubin 0.7 mg/dL      eGFR Non African Amer 64 mL/min/1.73      Globulin 4.0 gm/dL      A/G Ratio 1.1 g/dL      BUN/Creatinine Ratio 21.3     Anion Gap 13.2 mmol/L     Narrative:       GFR Normal >60  Chronic Kidney Disease <60  Kidney Failure <15      Lipase [[id number]]  (Normal) Collected:  04/12/20 2321    Specimen:  Blood Updated:  04/12/20 2343     Lipase 24 U/L     CBC & Differential [[id number]] Collected:  04/12/20 2321    Specimen:  Blood Updated:  04/12/20 2327    Narrative:       The following orders were created for panel order CBC & Differential.  Procedure                               Abnormality         Status                     ---------                               -----------         ------                     CBC Auto Differential[[id number]]        Abnormal            Final result                 Please view results for these tests on the individual orders.    CBC Auto Differential [[id number]]  (Abnormal) Collected:  04/12/20 2321    Specimen:  Blood Updated:  04/12/20 2327     WBC 10.32 10*3/mm3      RBC 5.07 10*6/mm3      Hemoglobin 15.5 g/dL      Hematocrit 47.6 %      MCV 93.9 fL      MCH 30.6 pg      MCHC 32.6 g/dL      RDW  14.0 %      RDW-SD 48.2 fl      MPV 10.4 fL      Platelets 304 10*3/mm3      Neutrophil % 84.7 %      Lymphocyte % 7.8 %      Monocyte % 5.9 %      Eosinophil % 0.4 %      Basophil % 0.6 %      Immature Grans % 0.6 %      Neutrophils, Absolute 8.75 10*3/mm3      Lymphocytes, Absolute 0.80 10*3/mm3      Monocytes, Absolute 0.61 10*3/mm3      Eosinophils, Absolute 0.04 10*3/mm3      Basophils, Absolute 0.06 10*3/mm3      Immature Grans, Absolute 0.06 10*3/mm3      nRBC 0.0 /100 WBC           Imaging Results (Most Recent)     Procedure Component Value Units Date/Time    CT Abdomen Pelvis With Contrast [[id number]] Collected:  04/13/20 0117     Updated:  04/13/20 0131    Narrative:       CT Abdomen Pelvis W    INDICATION:   Abdominal pain and vomiting with known abdominal wall hernia. Symptoms started at 8:00 AM yesterday.    TECHNIQUE:   CT of the abdomen and pelvis with IV and oral contrast. Coronal and sagittal reconstructions were obtained.  Radiation dose reduction techniques included automated exposure control or exposure modulation based on body size. Count of known CT and cardiac  nuc med studies performed in previous 12 months: 2.     COMPARISON:   10/27/2016    FINDINGS:  There is streak artifact due to body habitus. The patient's body wall is contacting the CT gantry. There is evidence of air-trapping in the lower lung zones, similar to prior suggesting small airways disease. Gallbladder is grossly normal although not  well characterized. There is no biliary obstruction. Solid abdominal organs are within normal limits. Urinary bladder is relatively decompressed but grossly normal. Uterus is surgically absent. The appendix is surgically absent as well. There is trace  nonspecific free fluid in the dependent pelvis. There is mild colonic diverticulosis without evidence of discrete diverticulitis.     There are multiple midline ventral wall hernias. The most cephalad contains only abdominal fat. This is just  anterior to the stomach. There are 2 additional midline ventral wall hernias below the above-mentioned hernia. The most cephalad of these  contains a small bowel loop just to the right of midline. This is new or worsened from prior. An additional ventral wall hernia in the midline below the above-mentioned contains at least 2 loops of small bowel. The adjacent small bowel loops appear  mildly dilated and fluid-filled suggesting that at least one of these hernias is an incarcerating. However, this is not completely obstructing as there is contrast in the ileal loops within the pelvis. No free air is identified. There is diffuse  degenerative disease in the visualized spine.      Impression:         1. Multiple ventral wall hernias as above that have worsened from prior. They contain loops of small bowel. At least one of these is causing partial obstruction. Complete obstruction is not identified as there is oral contrast in distal ileal loops  within the pelvis.  2. Colonic diverticulosis without diverticulitis.  3. Trace nonspecific free fluid in the pelvis.  4. Appendectomy and hysterectomy.  5. Exam degraded by artifact from body habitus.          Signer Name: Ashok Munguia MD   Signed: 4/13/2020 1:17 AM   Workstation Name: [email or web address]    [facility]        reviewed    ECG/EMG Results (most recent)     None        Pending    Assessment/Plan   PSBO 2/2 multiple ventral wall incarcerated hernias:   Colonic diverticulosis:   Crohn's disease:  BRBPR: Surgery/GI consulted   N.p.o., NG tube, IV hydration/control n/v/pain  No Lovenox for now pending surgery evaluation  Resume home Imuran when able  Control pain, nausea  Await further specialist input  Monitor     Hypertension:  No current acute issues, holding home lisinopril/HCTZ  Control pain first  Monitor    Dyslipidemia: No current acute issues,, resume home Pravachol 80 mg daily when able    GERD: Add Pepcid IV    Hypothyroidism: No current  acute issues, check TSH  Add IV levothyroxine 25 mcg daily, resume home oral 50 mcg daily when able    KELLY: Allow home CPAP when able, followed by Dr. Roberts outpatient, review of past notes shows good compliance with machine  Add oxygen for nighttime with NGT in place    Glaucoma: Add home Xalatan eyedrops    Super morbid obesity:   Body mass index is 69.48 kg/m².   Check TSH/A1c  Consult dietitian once off n.p.o.    Trace hematuria: Unclear whether clean-catch versus cath specimen, plan repeat near future    I discussed the patients findings and my recommendations with patient and staff.     Yanet Angel, APRN  04/13/20  03:12

## 2020-04-13 NOTE — CONSULTS
Patient Care Team:  Yolanda Marshall PA as PCP - General (Physician Assistant)    CHIEF COMPLAINT: Rectal Bleeding    HISTORY OF PRESENT ILLNESS:    63 yo ALBERT SCOTT presents with a day of abd pain and vomiting with an abd wall buldge. Her obstructing hernia has been reduced and she has been seen by surgery with plans for elective repair as an OP.   She describes intermittant rectal bleeding for some time but just yesterday and today had some painless rectal bleeding. She says her bowels are normal and 2 a day and her CT ofcourse was unremarkable wrt Crohns. And her HGB is 15 and NC/NC  Her history of crohns treated by Dr Greenwood was Diagnosed in [place] in 2007 and she feels it was SB only. SHe has never required surgery for her Crohns b ut has of course had Abd wall hernias repaired before. Her most recent colonoscopy was in 1/2020.       Past Medical History:   Diagnosis Date   • Arthritis    • Colon polyp    • Crohn's disease (CMS/HCC)    • Crohn's disease (CMS/HCC)    • GERD (gastroesophageal reflux disease)    • Glaucoma    • H/O renal failure     post-op   • High cholesterol    • History of transfusion    • Hypertension    • Hypothyroidism    • Respiratory failure, post-operative (CMS/HCC)     on vent x3 days 12/2015   • Sleep apnea     w/cpap   • Ventral hernia     sched repair   • Vertigo      Past Surgical History:   Procedure Laterality Date   • COLONOSCOPY     • COLONOSCOPY N/A 1/8/2020    Procedure: COLONOSCOPY , polypectomy with biopsies;  Surgeon: Shantal Greenwood MD;  Location: [facility] OR;  Service: Gastroenterology   • GANGLION CYST EXCISION     • HYSTERECTOMY     • OMENTECTOMY     • TONSILLECTOMY     • UMBILICAL HERNIA REPAIR  12/2015   • VENTRAL/INCISIONAL HERNIA REPAIR N/A 11/30/2016    Procedure: VENTRAL/INCISIONAL HERNIA REPAIR;  Surgeon: Sumit Logan MD;  Location: [facility] OR;  Service:      Family History   Problem Relation Age of Onset   • Arthritis Mother    • Diabetes Maternal  Grandmother    • Glaucoma Maternal Grandmother    • Heart disease Maternal Grandfather    • Cancer Maternal Grandfather    • Heart disease Paternal Grandfather    • Breast cancer Neg Hx    • Colon cancer Neg Hx    • Colon polyps Neg Hx      Social History     Tobacco Use   • Smoking status: Never Smoker   • Smokeless tobacco: Never Used   Substance Use Topics   • Alcohol use: No   • Drug use: No     Medications Prior to Admission   Medication Sig Dispense Refill Last Dose   • azaTHIOprine (IMURAN) 50 MG tablet TAKE FOUR AND ONE-HALF TABLETS BY MOUTH EVERY  tablet 5 4/12/2020 at 0800   • B Complex Vitamins (VITAMIN B COMPLEX) capsule capsule Take 1 capsule by mouth Daily.   4/12/2020 at 0800   • Biotin 10 MG tablet Take 10 mg by mouth Daily.   4/12/2020 at 0800   • Calcium Carb-Cholecalciferol (CALCIUM 600 + D PO) Take 2 tablets by mouth 2 (Two) Times a Day.   4/12/2020 at 0800   • celecoxib (CeleBREX) 200 MG capsule Take 200 mg by mouth 2 (Two) Times a Day.   4/12/2020 at 0800   • Cholecalciferol (VITAMIN D3) 2000 UNITS capsule Take 1,000 Units by mouth Daily.   4/12/2020 at 0800   • fluticasone (FLONASE) 50 MCG/ACT nasal spray 2 sprays into the nostril(s) as directed by provider Daily.      • Glucosamine-Chondroitin 750-600 MG tablet Take 2 tablets by mouth Daily.   4/12/2020 at 0800   • hydroCHLOROthiazide (HYDRODIURIL) 25 MG tablet Take 25 mg by mouth Daily.   4/12/2020 at Unknown time   • latanoprost (XALATAN) 0.005 % ophthalmic solution    4/12/2020 at 2000   • levothyroxine (SYNTHROID, LEVOTHROID) 50 MCG tablet Take 50 mcg by mouth Daily.   4/12/2020 at 0800   • lisinopril-hydrochlorothiazide (PRINZIDE,ZESTORETIC) 20-12.5 MG per tablet Take 1 tablet by mouth 2 (Two) Times a Day Before Meals.   4/12/2020 at 0800   • methocarbamol (ROBAXIN) 750 MG tablet Take 750 mg by mouth 2 (Two) Times a Day. One in morning and one at night   4/12/2020 at 0800   • Multiple Vitamin (MULTI VITAMIN DAILY PO) Take 1 tablet  "by mouth Daily.   4/12/2020 at 0800   • Omega-3 Fatty Acids (FISH OIL) 1000 MG capsule capsule Take 500 mg by mouth Daily With Breakfast.   4/12/2020 at 0800   • omeprazole (PriLOSEC) 20 MG capsule Take 20 mg by mouth Daily.   4/12/2020 at 0800   • pravastatin (PRAVACHOL) 80 MG tablet    4/11/2020 at 2100   • sertraline (ZOLOFT) 100 MG tablet Take 100 mg by mouth Every Night.   4/11/2020 at 2100   • vitamin C (ASCORBIC ACID) 250 MG tablet Take 1,000 mg by mouth Daily.   4/12/2020 at 0800     Allergies:  Codeine and Penicillins    REVIEW OF SYSTEMS:  Please see the above history of present illness for pertinent positives and negatives.  The remainder of the patient's systems have been reviewed and are negative.     Vital Signs  Temp:  [97.8 °F (36.6 °C)-98.5 °F (36.9 °C)] 97.8 °F (36.6 °C)  Heart Rate:  [64-81] 64  Resp:  [18-23] 20  BP: ()/(61-76) 99/61    Flowsheet Rows      First Filed Value   Admission Height  157.5 cm (62\") Documented at 04/12/2020 2243   Admission Weight  (!) 172 kg (378 lb 3.2 oz) Documented at 04/12/2020 2243           Physical Exam:  Physical Exam   Constitutional: Patient appears well-developed and well-nourished and in no acute distress   HEENT:   Head: Normocephalic and atraumatic.   Eyes:  Pupils are equal, round, and reactive to light. EOM are intact. Sclerae are anicteric and non-injected.  Mouth and Throat: Patient has moist mucous membranes. Oropharynx is clear of any erythema or exudate.     Neck: Neck supple. No JVD present. No thyromegaly present. No lymphadenopathy present.  Cardiovascular: Regular rate, regular rhythm, S1 normal and S2 normal.  Exam reveals no gallop and no friction rub.  No murmur heard.  Pulmonary/Chest: Lungs are clear to auscultation bilaterally. No respiratory distress. No wheezes. No rhonchi. No rales.   Abdominal: Soft. Bowel sounds are normal. No distension and no mass. There is no hepatosplenomegaly. There is Focal RUQ  Tenderness at the site of " her hernia.   Musculoskeletal: Normal Muscle tone  Extremities: No edema. Pulses are palpable in all 4 extremities.  Neurological: Patient is alert and oriented to person, place, and time. Cranial nerves II-XII are grossly intact with no focal deficits.  Skin: Skin is warm. No rash noted. Nails show no clubbing.  No cyanosis or erythema.    Debilities/Disabilities Identified: None  Emotional Behavior: Appropriate     Results Review:    I reviewed the patient's new clinical results.  Lab Results (most recent)     Procedure Component Value Units Date/Time    Protime-INR [[id number]]  (Normal) Collected:  04/13/20 0429    Specimen:  Blood Updated:  04/13/20 0535     Protime 13.3 Seconds      INR 1.04    Narrative:       Therapeutic Ranges for INR: 2.0-3.0 (PT 20-30)                              2.5-3.5 (PT 25-34)    TSH [[id number]]  (Normal) Collected:  04/12/20 2321    Specimen:  Blood Updated:  04/13/20 0342     TSH 3.240 uIU/mL     Hemoglobin A1c [[id number]]  (Abnormal) Collected:  04/12/20 2321    Specimen:  Blood Updated:  04/13/20 0335     Hemoglobin A1C 5.80 %     Narrative:       Hemoglobin A1C Ranges:    Increased Risk for Diabetes  5.7% to 6.4%  Diabetes                     >= 6.5%  Diabetic Goal                < 7.0%    Urinalysis, Microscopic Only - Urine, Clean Catch [[id number]]  (Abnormal) Collected:  04/13/20 0006    Specimen:  Urine, Clean Catch Updated:  04/13/20 0014     RBC, UA 6-12 /HPF      WBC, UA 3-5 /HPF      Bacteria, UA 1+ /HPF      Squamous Epithelial Cells, UA 3-6 /HPF      Hyaline Casts, UA None Seen /LPF      Methodology Manual Light Microscopy    Urinalysis With Microscopic If Indicated (No Culture) - Urine, Clean Catch [[id number]]  (Abnormal) Collected:  04/13/20 0006    Specimen:  Urine, Clean Catch Updated:  04/13/20 0014     Color, UA Yellow     Appearance, UA Clear     pH, UA 7.5     Specific Gravity, UA 1.025     Glucose, UA Negative     Ketones, UA Negative     Bilirubin, UA  Negative     Blood, UA Trace     Protein, UA Negative     Leuk Esterase, UA Negative     Nitrite, UA Negative     Urobilinogen, UA 0.2 E.U./dL    Comprehensive Metabolic Panel [[id number]]  (Abnormal) Collected:  04/12/20 2321    Specimen:  Blood Updated:  04/12/20 2343     Glucose 157 mg/dL      BUN 19 mg/dL      Creatinine 0.89 mg/dL      Sodium 136 mmol/L      Potassium 3.7 mmol/L      Chloride 95 mmol/L      CO2 27.8 mmol/L      Calcium 9.5 mg/dL      Total Protein 8.2 g/dL      Albumin 4.20 g/dL      ALT (SGPT) 21 U/L      AST (SGOT) 33 U/L      Alkaline Phosphatase 83 U/L      Total Bilirubin 0.7 mg/dL      eGFR Non African Amer 64 mL/min/1.73      Globulin 4.0 gm/dL      A/G Ratio 1.1 g/dL      BUN/Creatinine Ratio 21.3     Anion Gap 13.2 mmol/L     Narrative:       GFR Normal >60  Chronic Kidney Disease <60  Kidney Failure <15      Lipase [[id number]]  (Normal) Collected:  04/12/20 2321    Specimen:  Blood Updated:  04/12/20 2343     Lipase 24 U/L     CBC & Differential [[id number]] Collected:  04/12/20 2321    Specimen:  Blood Updated:  04/12/20 2327    Narrative:       The following orders were created for panel order CBC & Differential.  Procedure                               Abnormality         Status                     ---------                               -----------         ------                     CBC Auto Differential[[id number]]        Abnormal            Final result                 Please view results for these tests on the individual orders.    CBC Auto Differential [[id number]]  (Abnormal) Collected:  04/12/20 2321    Specimen:  Blood Updated:  04/12/20 2327     WBC 10.32 10*3/mm3      RBC 5.07 10*6/mm3      Hemoglobin 15.5 g/dL      Hematocrit 47.6 %      MCV 93.9 fL      MCH 30.6 pg      MCHC 32.6 g/dL      RDW 14.0 %      RDW-SD 48.2 fl      MPV 10.4 fL      Platelets 304 10*3/mm3      Neutrophil % 84.7 %      Lymphocyte % 7.8 %      Monocyte % 5.9 %      Eosinophil % 0.4 %      Basophil %  0.6 %      Immature Grans % 0.6 %      Neutrophils, Absolute 8.75 10*3/mm3      Lymphocytes, Absolute 0.80 10*3/mm3      Monocytes, Absolute 0.61 10*3/mm3      Eosinophils, Absolute 0.04 10*3/mm3      Basophils, Absolute 0.06 10*3/mm3      Immature Grans, Absolute 0.06 10*3/mm3      nRBC 0.0 /100 WBC           Imaging Results (Most Recent)     Procedure Component Value Units Date/Time    CT Abdomen Pelvis With Contrast [[id number]] Collected:  04/13/20 0117     Updated:  04/13/20 0131    Narrative:       CT Abdomen Pelvis W    INDICATION:   Abdominal pain and vomiting with known abdominal wall hernia. Symptoms started at 8:00 AM yesterday.    TECHNIQUE:   CT of the abdomen and pelvis with IV and oral contrast. Coronal and sagittal reconstructions were obtained.  Radiation dose reduction techniques included automated exposure control or exposure modulation based on body size. Count of known CT and cardiac  nuc med studies performed in previous 12 months: 2.     COMPARISON:   10/27/2016    FINDINGS:  There is streak artifact due to body habitus. The patient's body wall is contacting the CT gantry. There is evidence of air-trapping in the lower lung zones, similar to prior suggesting small airways disease. Gallbladder is grossly normal although not  well characterized. There is no biliary obstruction. Solid abdominal organs are within normal limits. Urinary bladder is relatively decompressed but grossly normal. Uterus is surgically absent. The appendix is surgically absent as well. There is trace  nonspecific free fluid in the dependent pelvis. There is mild colonic diverticulosis without evidence of discrete diverticulitis.     There are multiple midline ventral wall hernias. The most cephalad contains only abdominal fat. This is just anterior to the stomach. There are 2 additional midline ventral wall hernias below the above-mentioned hernia. The most cephalad of these  contains a small bowel loop just to the right of  midline. This is new or worsened from prior. An additional ventral wall hernia in the midline below the above-mentioned contains at least 2 loops of small bowel. The adjacent small bowel loops appear  mildly dilated and fluid-filled suggesting that at least one of these hernias is an incarcerating. However, this is not completely obstructing as there is contrast in the ileal loops within the pelvis. No free air is identified. There is diffuse  degenerative disease in the visualized spine.      Impression:         1. Multiple ventral wall hernias as above that have worsened from prior. They contain loops of small bowel. At least one of these is causing partial obstruction. Complete obstruction is not identified as there is oral contrast in distal ileal loops  within the pelvis.  2. Colonic diverticulosis without diverticulitis.  3. Trace nonspecific free fluid in the pelvis.  4. Appendectomy and hysterectomy.  5. Exam degraded by artifact from body habitus.          Signer Name: Ashok Munguia MD   Signed: 4/13/2020 1:17 AM   Workstation Name: [email or web address]    [facility]        reviewed    ECG/EMG Results (most recent)     Procedure Component Value Units Date/Time    ECG 12 Lead [[id number]] Collected:  04/13/20 0314     Updated:  04/13/20 1130    Narrative:       HEART RATE= 70  bpm  RR Interval= 880  ms  WI Interval= 162  ms  P Horizontal Axis= 6  deg  P Front Axis= 55  deg  QRSD Interval= 106  ms  QT Interval= 442  ms  QRS Axis= 37  deg  T Wave Axis= 41  deg  - OTHERWISE NORMAL ECG -  Sinus rhythm  Atrial premature complex- new   Electronically Signed By: Myranda Oviedo ([facility]) 13-Apr-2020 11:29:47  Date and Time of Study: 2020-04-13 03:14:28        reviewed    Assessment/Plan     Abdominal Wall hernia  Strangulated SB-now reduced  Rectal BLeeding  History of SB Crohns  GERD  MOB    Would consider this Anorectal bleeding and probably not from her strangulated hernia due to timing prior to her  symptoms.  At this time would continue her present meds and let her have HC cream for her Hemorrhoids and follow. Pt was strongly encouraged to follow up with Dr Contreras as OP for her hernia repair.        I discussed the patients findings and my recommendations with patient.     Christiano Cazares MD  04/13/20  18:09    Time: NOt recorded

## 2020-04-13 NOTE — PLAN OF CARE
Problem: Patient Care Overview  Goal: Plan of Care Review  Outcome: Ongoing (interventions implemented as appropriate)  Flowsheets (Taken 4/13/2020 1722)  Progress: improving  Plan of Care Reviewed With: patient  Outcome Summary: NG tube d'cd this am, patient not having any nausea today & states she feels bettter. IV fluids decreased to 75 ml/hr, no pain medication given this shift. Tolerating clear liquid diet. Family brought patients own CPAP for her to wear tonight. Patient will need to follow up with surgeon as outpatient for hernia repair.

## 2020-04-13 NOTE — CONSULTS
Chief Complaint   Patient presents with   • Abdominal Pain     most of the day with vomiting.  Pt has a known hernia.        Patient is a 64 y.o. female referred by Dr. Espinosa for evaluation of abdominal pain, nausea and vomiting.  Patient reports she has had multiple incisional hernia repairs.  However yesterday, she started having abdominal pain and the hernia became extremely hard.  Patient then started having nausea and vomiting and could not stop vomiting.  Patient then presented to the emergency room.  Since the patient has been admitted she has not had any further vomiting.  Patient reports her pain is much better.    Past Medical History:   Diagnosis Date   • Arthritis    • Colon polyp    • Crohn's disease (CMS/HCC)    • Crohn's disease (CMS/HCC)    • GERD (gastroesophageal reflux disease)    • Glaucoma    • H/O renal failure     post-op   • High cholesterol    • History of transfusion    • Hypertension    • Hypothyroidism    • Respiratory failure, post-operative (CMS/HCC)     on vent x3 days 12/2015   • Sleep apnea     w/cpap   • Ventral hernia     sched repair   • Vertigo      Past Surgical History:   Procedure Laterality Date   • COLONOSCOPY     • COLONOSCOPY N/A 1/8/2020    Procedure: COLONOSCOPY , polypectomy with biopsies;  Surgeon: Shantal Greenwood MD;  Location: [facility] OR;  Service: Gastroenterology   • GANGLION CYST EXCISION     • HYSTERECTOMY     • OMENTECTOMY     • TONSILLECTOMY     • UMBILICAL HERNIA REPAIR  12/2015   • VENTRAL/INCISIONAL HERNIA REPAIR N/A 11/30/2016    Procedure: VENTRAL/INCISIONAL HERNIA REPAIR;  Surgeon: Sumit Logan MD;  Location: [facility] OR;  Service:      Family History   Problem Relation Age of Onset   • Arthritis Mother    • Diabetes Maternal Grandmother    • Glaucoma Maternal Grandmother    • Heart disease Maternal Grandfather    • Cancer Maternal Grandfather    • Heart disease Paternal Grandfather    • Breast cancer Neg Hx    • Colon cancer Neg Hx    • Colon  polyps Neg Hx      Social History     Tobacco Use   • Smoking status: Never Smoker   • Smokeless tobacco: Never Used   Substance Use Topics   • Alcohol use: No   • Drug use: No     Allergies   Allergen Reactions   • Codeine Nausea And Vomiting   • Penicillins Hives       Current Facility-Administered Medications:   •  famotidine (PEPCID) injection 20 mg, 20 mg, Intravenous, Q12H, Yanet Angel R, APRN, 20 mg at 04/13/20 0831  •  fluticasone (FLONASE) 50 MCG/ACT nasal spray 2 spray, 2 spray, Nasal, Daily, Yanet Angel, APRN, 2 spray at 04/13/20 0832  •  HYDROmorphone (DILAUDID) injection 0.5 mg, 0.5 mg, Intravenous, Q2H PRN, 0.5 mg at 04/13/20 0248 **AND** naloxone (NARCAN) injection 0.4 mg, 0.4 mg, Intravenous, Q5 Min PRN, Yanet Angel, APRN  •  lactated ringers infusion, 150 mL/hr, Intravenous, Continuous, Yanet Angel, APRN, Last Rate: 150 mL/hr at 04/13/20 0355, 150 mL/hr at 04/13/20 0355  •  latanoprost (XALATAN) 0.005 % ophthalmic solution 1 drop, 1 drop, Both Eyes, Daily, Yanet Angel R, APRN  •  levothyroxine sodium injection 25 mcg, 25 mcg, Intravenous, Q AM, Yanet Angel, APRN, 25 mcg at 04/13/20 0629  •  ondansetron (ZOFRAN) tablet 4 mg, 4 mg, Oral, Q6H PRN **OR** ondansetron (ZOFRAN) injection 8 mg, 8 mg, Intravenous, Q6H PRN, Yanet Angel R, APRN  •  promethazine (PHENERGAN) IV syringe 6.25 mg, 6.25 mg, Intravenous, Q6H PRN, Yanet Angel R, APRN  •  [COMPLETED] Insert peripheral IV, , , Once **AND** sodium chloride 0.9 % flush 10 mL, 10 mL, Intravenous, PRN, Luis Gonzalez MD  •  sodium chloride 0.9 % flush 10 mL, 10 mL, Intravenous, PRN, Yanet Angel R, APRN  •  sodium chloride 0.9 % flush 10 mL, 10 mL, Intravenous, Q12H, Yanet Angel, APRN  •  sodium chloride 0.9 % infusion 40 mL, 40 mL, Intravenous, PRN, Yanet Angel, APRN    Review of Systems  General: Patient reports during good health  Eyes: No eye problems  Ears, nose, mouth and throat: No  "rhinitis, no hearing problems, no chronic cough  Cardiovascular/heart: Denies palpitations, syncope or chest pain  Respiratory/lung: Denies shortness of breath, hemoptysis, dyspnea on exertion, sleep apnea  Genital/urinary: No frequency, hematuria or dysuria  Hematological/lymphatic: Denies anemia or other problems  Musculoskeletal: Positive joint and back pain  Skin: No psoriasis or other skin issues  Neurological: No seizures or other neurological problems  Psychiatric: None  Endocrine: Hypothyroidism  Gastro-intestinal: GERD, see HPI  Vitals:    04/13/20 0222 04/13/20 0330 04/13/20 0400 04/13/20 0647   BP: 123/73   110/62   BP Location: Right arm   Right arm   Patient Position: Lying   Lying   Pulse: 75   69   Resp: 18   23   Temp: 98.2 °F (36.8 °C)   98.5 °F (36.9 °C)   TempSrc: Oral   Oral   SpO2: 92% (!) 88% 93% 95%   Weight: (!) 172 kg (379 lb 14.4 oz)      Height: 157.5 cm (62\")          Physical Exam  General/physcological:   Alert and oriented x3, in no acute distress, morbidly obese  HEENT: Normal cephalic, atraumatic, PERRLA, EOMI, sclera anicteric, moist mucous membranes, neck is supple, no JVD, no carotid bruits, no thyromegaly no adenopathy  Respiratory: CTA and percussion  CVA: RRR, normal S1-S2, no murmurs, no gallops or rubs  GI: Positive BS, soft, nondistended, nontender, no rebound, no guarding, reducible incisional hernia, no organomegaly and no masses  Musculoskeletal: Moves all 4 ext, no clubbing, no cyanosis or edema  Neurovascular: Grossly intact  Debilities: none  Emotional behavior: appropriate     Patient does not use tobacco products currently.     Results from last 7 days   Lab Units 04/12/20  2321   WBC 10*3/mm3 10.32   HEMOGLOBIN g/dL 15.5   HEMATOCRIT % 47.6*   PLATELETS 10*3/mm3 304     Results from last 7 days   Lab Units 04/12/20  2321   SODIUM mmol/L 136   POTASSIUM mmol/L 3.7   CHLORIDE mmol/L 95*   CO2 mmol/L 27.8   BUN mg/dL 19   CREATININE mg/dL 0.89   CALCIUM mg/dL 9.5 "   BILIRUBIN mg/dL 0.7   ALK PHOS U/L 83   ALT (SGPT) U/L 21   AST (SGOT) U/L 33*   GLUCOSE mg/dL 157*     Assessment:  Recurrent incisional hernia reducible  Plan:  I have advised the patient at this time since the hernia is reducible she would not require any emergency surgery.  Her NG has minimal output.  I will DC her NG and try clear liquids.  If patient is able to tolerate diet may DC home and follow-up as needed.  I have advised the patient that eventually she will need to have an elective surgery to repair her hernia.    Hortensia Contreras MD  General, Minimally Invasive and Endoscopic Surgery  [facility]      [address] [facility] [address] [place] [address]    [address]  [address] [address]    P: [phone]  F: [phone]    Cc:  Yolanda Marshall, PA

## 2020-04-13 NOTE — PLAN OF CARE
Problem: Patient Care Overview  Goal: Plan of Care Review  Outcome: Ongoing (interventions implemented as appropriate)  Flowsheets (Taken 4/13/2020 0512)  Progress: no change  Plan of Care Reviewed With: patient  Outcome Summary: arrived to unit with NGT to LWS - complaints of pain and N/V - NPO - GI and SX have been consulted - using 2L oxygen here, not on home O2 - VSS - will continue to monitor

## 2020-04-13 NOTE — ED PROVIDER NOTES
Subjective     History provided by:  Patient and medical records    History of Present Illness    · Chief complaint: Abdominal pain and vomiting    · Location: Pain in the right side of abdominal wall hernia.  Also generalized abdominal pain.    · Quality/Severity: The pain is severe in the right sided abdominal wall hernia.  The generalized abdominal pain is mild to moderate.    · Timing/Onset: Pain started mild this morning and got severe about 1 PM.    · Modifying Factors: Her abdomen is tender.    · Associated symptoms: She developed nausea and vomiting about 6 PM and is vomited about 5-6 times.    · Narrative: The patient is a 64-year-old white female who developed pain in her right abdominal wall hernia this morning that became severe about 1 PM.  She states that her hernia was hard earlier, but now is soft.  She also reports some generalized abdominal pain.  At 6 PM she developed nausea and vomiting is vomited 5-6 times.  She has not attempted p.o. since the nausea started.  She has a history of an umbilical hernia repair by Dr. Tang and a ventral hernia repair by Dr. Logan.  She also has a history of a omentectomy and hysterectomy.  She has a history of Crohn's disease and GERD and colon polyps.  She had a colonoscopy and polypectomy January 8 of this year by Dr. Patel.  She reports bright red blood in her stool the last 2 days.    Review of Systems   Constitutional: Negative for activity change, appetite change, chills, diaphoresis, fatigue and fever.   HENT: Negative for congestion, dental problem, ear pain, hearing loss, mouth sores, postnasal drip, rhinorrhea, sinus pressure, sore throat, trouble swallowing and voice change.    Eyes: Negative for photophobia, pain, discharge, redness and visual disturbance.   Respiratory: Negative for cough, chest tightness, shortness of breath, wheezing and stridor.    Cardiovascular: Negative for chest pain, palpitations and leg swelling.   Gastrointestinal: Positive  "for abdominal pain, blood in stool, nausea and vomiting. Negative for diarrhea.   Genitourinary: Negative for difficulty urinating, dysuria, flank pain, frequency, hematuria and urgency.   Musculoskeletal: Negative for arthralgias, back pain, gait problem, joint swelling, myalgias, neck pain and neck stiffness.   Skin: Negative for color change and rash.   Neurological: Negative for dizziness, tremors, seizures, syncope, facial asymmetry, speech difficulty, weakness, light-headedness, numbness and headaches.   Hematological: Negative for adenopathy.   Psychiatric/Behavioral: Negative.  Negative for confusion and decreased concentration. The patient is not nervous/anxious.      Past Medical History:   Diagnosis Date   • Arthritis    • Colon polyp    • Crohn's disease (CMS/HCC)    • Crohn's disease (CMS/HCC)    • GERD (gastroesophageal reflux disease)    • Glaucoma    • H/O renal failure     post-op   • High cholesterol    • History of transfusion    • Hypertension    • Hypothyroidism    • Respiratory failure, post-operative (CMS/HCC)     on vent x3 days 12/2015   • Sleep apnea     w/cpap   • Ventral hernia     sched repair   • Vertigo      /76   Pulse 71   Temp 98.1 °F (36.7 °C) (Oral)   Resp 18   Ht 157.5 cm (62\")   Wt (!) 172 kg (378 lb 3.2 oz)   SpO2 92%   BMI 69.17 kg/m²     Past Medical History:   Diagnosis Date   • Arthritis    • Colon polyp    • Crohn's disease (CMS/HCC)    • Crohn's disease (CMS/HCC)    • GERD (gastroesophageal reflux disease)    • Glaucoma    • H/O renal failure     post-op   • High cholesterol    • History of transfusion    • Hypertension    • Hypothyroidism    • Respiratory failure, post-operative (CMS/HCC)     on vent x3 days 12/2015   • Sleep apnea     w/cpap   • Ventral hernia     sched repair   • Vertigo        Allergies   Allergen Reactions   • Codeine Nausea And Vomiting   • Penicillins Hives       Past Surgical History:   Procedure Laterality Date   • COLONOSCOPY     • " COLONOSCOPY N/A 1/8/2020    Procedure: COLONOSCOPY , polypectomy with biopsies;  Surgeon: Shantal Greenwood MD;  Location:  [facility];  Service: Gastroenterology   • GANGLION CYST EXCISION     • HYSTERECTOMY     • OMENTECTOMY     • TONSILLECTOMY     • UMBILICAL HERNIA REPAIR  12/2015   • VENTRAL/INCISIONAL HERNIA REPAIR N/A 11/30/2016    Procedure: VENTRAL/INCISIONAL HERNIA REPAIR;  Surgeon: Sumit Logan MD;  Location:  [facility];  Service:        Family History   Problem Relation Age of Onset   • Arthritis Mother    • Diabetes Maternal Grandmother    • Glaucoma Maternal Grandmother    • Heart disease Maternal Grandfather    • Cancer Maternal Grandfather    • Heart disease Paternal Grandfather    • Breast cancer Neg Hx    • Colon cancer Neg Hx    • Colon polyps Neg Hx        Social History     Socioeconomic History   • Marital status:      Spouse name: Not on file   • Number of children: Not on file   • Years of education: Not on file   • Highest education level: Not on file   Tobacco Use   • Smoking status: Never Smoker   • Smokeless tobacco: Never Used   Substance and Sexual Activity   • Alcohol use: No   • Drug use: No   • Sexual activity: Defer           Objective   Physical Exam   Constitutional: She is oriented to person, place, and time. She appears well-developed and well-nourished. No distress.   The patient is morbidly obese.  She does not appear toxic or acutely ill.  She does not appear in significant distress.  Review of her vital signs: She is afebrile with a temperature 98.1, respirations are normal 18 with a slightly diminished oxygen saturation 92%, heart rate is normal 71, blood pressure mildly elevated 154/76.   HENT:   Head: Normocephalic and atraumatic.   Nose: Nose normal.   Mouth/Throat: Oropharynx is clear and moist. No oropharyngeal exudate.   Eyes: Pupils are equal, round, and reactive to light. EOM are normal. Right eye exhibits no discharge. Left eye exhibits no discharge. No  scleral icterus.   Neck: Normal range of motion. Neck supple. No JVD present. No thyromegaly present.   Cardiovascular: Normal rate, regular rhythm and normal heart sounds.   No murmur heard.  Pulmonary/Chest: Effort normal and breath sounds normal. She has no wheezes. She has no rales. She exhibits no tenderness.   Abdominal: Soft. Bowel sounds are normal. She exhibits no distension. There is no hepatosplenomegaly. There is tenderness. There is no rigidity, no rebound, no guarding, no CVA tenderness, no tenderness at McBurney's point and negative Pardo's sign.   The patient's abdomen has a midline surgical scar.  In the mid right abdomen adjacent to the surgical scar there is a firmness and tenderness over an abdominal wall hernia.  Remainder of the abdomen is soft.  She has voluntary guarding over the hernia, but no involuntary guarding.   Musculoskeletal: Normal range of motion. She exhibits no edema, tenderness or deformity.   Lymphadenopathy:     She has no cervical adenopathy.   Neurological: She is alert and oriented to person, place, and time. No cranial nerve deficit. Coordination normal.   No focal motor sensory deficit   Skin: Skin is warm and dry. Capillary refill takes less than 2 seconds. No rash noted. She is not diaphoretic.   Psychiatric: She has a normal mood and affect. Her behavior is normal. Judgment and thought content normal.   Nursing note and vitals reviewed.      Procedures           ED Course  ED Course as of Apr 13 0202   Mon Apr 13, 2020   0143 The patient was administered normal saline at 250 cc/h.  For nausea she was administered Zofran 8 mg IV.  For pain she was administered fentanyl 75 mcg IV.    [TP]   0143 Review the patient's test results: Her CBC had a white count that was 10.3 with a left shift.  Platelets within normal limits.  CMP had mildly elevated blood glucose of 157, otherwise normal electrolytes, normal renal and liver function test.  Lipase was normal.  Urinalysis  showed microscopic hematuria without wood evidence of infection.  CT of the abdomen pelvis formed with oral and IV contrast.  CT was interpreted by the radiologist as multiple ventral wall hernias containing loops of small bowel, with at least 1 causing a partial small bowel obstruction.    [TP]   0144 01:32 the patient discussed with Dr. Contreras, general surgeon on call, who requested the patient be admitted hospitalist consult.    [TP]   0146 01:40 patient discussed with Archana Teresa, hospitalist, who agreed to admit the patient.    [TP]   0146 An NG tube was placed to low wall suction.  500 cc gastric fluid was obtained initially.    [TP]      ED Course User Index  [TP] Luis Gonzalez MD                                           [facility]  Number of Diagnoses or Management Options  Incarcerated ventral hernia: new and requires workup  Partial small bowel obstruction (CMS/HCC): new and requires workup     Amount and/or Complexity of Data Reviewed  Clinical lab tests: ordered and reviewed  Tests in the radiology section of CPT®: ordered and reviewed  Discuss the patient with other providers: yes  Independent visualization of images, tracings, or specimens: yes    Risk of Complications, Morbidity, and/or Mortality  Presenting problems: high  Diagnostic procedures: high  Management options: high  General comments: My differential diagnosis for abdominal pain includes but is not limited to:  Gastritis, gastroenteritis, peptic ulcer disease, GERD, esophageal perforation, acute appendicitis, mesenteric adenitis, Meckel’s diverticulum, epiploic appendagitis, diverticulitis, colon cancer, ulcerative colitis, Crohn’s disease, intussusception, small bowel obstruction, adhesions, ischemic bowel, perforated viscus, ileus, obstipation, biliary colic, cholecystitis, cholelithiasis, Abdifatah-Michele Sergio, hepatitis, pancreatitis, common bile duct obstruction, cholangitis, bile leak, splenic trauma, splenic rupture, splenic infarction,  splenic abscess, abdominal abscess, ascites, spontaneous bacterial peritonitis, hernia, UTI, cystitis, prostatitis, ureterolithiasis, urinary obstruction, ovarian cyst, torsion, pregnancy, ectopic pregnancy, PID, pelvic abscess, mittelschmerz, endometriosis, AAA, myocardial infarction, pneumonia, cancer, porphyria, DKA, medications, sickle cell, viral syndrome, zoster    Patient Progress  Patient progress: stable      Final diagnoses:   Incarcerated ventral hernia   Partial small bowel obstruction (CMS/HCC)           Labs Reviewed   COMPREHENSIVE METABOLIC PANEL - Abnormal; Notable for the following components:       Result Value    Glucose 157 (*)     Chloride 95 (*)     AST (SGOT) 33 (*)     All other components within normal limits    Narrative:     GFR Normal >60  Chronic Kidney Disease <60  Kidney Failure <15     URINALYSIS W/ MICROSCOPIC IF INDICATED (NO CULTURE) - Abnormal; Notable for the following components:    Blood, UA Trace (*)     All other components within normal limits   CBC WITH AUTO DIFFERENTIAL - Abnormal; Notable for the following components:    Hematocrit 47.6 (*)     Neutrophil % 84.7 (*)     Lymphocyte % 7.8 (*)     Immature Grans % 0.6 (*)     Neutrophils, Absolute 8.75 (*)     Immature Grans, Absolute 0.06 (*)     All other components within normal limits   URINALYSIS, MICROSCOPIC ONLY - Abnormal; Notable for the following components:    RBC, UA 6-12 (*)     WBC, UA 3-5 (*)     Bacteria, UA 1+ (*)     Squamous Epithelial Cells, UA 3-6 (*)     All other components within normal limits   LIPASE - Normal   CBC AND DIFFERENTIAL    Narrative:     The following orders were created for panel order CBC & Differential.  Procedure                               Abnormality         Status                     ---------                               -----------         ------                     CBC Auto Differential[[id number]]        Abnormal            Final result                 Please view results  for these tests on the individual orders.     CT Abdomen Pelvis With Contrast   ED Interpretation      1. Multiple ventral wall hernias as above that have worsened from prior. They contain loops of small bowel. At least one of these is causing partial obstruction. Complete obstruction is not identified as there is oral contrast in distal ileal loops   within the pelvis.   2. Colonic diverticulosis without diverticulitis.   3. Trace nonspecific free fluid in the pelvis.   4. Appendectomy and hysterectomy.   5. Exam degraded by artifact from body habitus.               Signer Name: Ashok Munguia MD    Signed: 4/13/2020 1:17 AM    Workstation Name: [facility]      Final Result      1. Multiple ventral wall hernias as above that have worsened from prior. They contain loops of small bowel. At least one of these is causing partial obstruction. Complete obstruction is not identified as there is oral contrast in distal ileal loops   within the pelvis.   2. Colonic diverticulosis without diverticulitis.   3. Trace nonspecific free fluid in the pelvis.   4. Appendectomy and hysterectomy.   5. Exam degraded by artifact from body habitus.               Signer Name: Ashok Munguia MD    Signed: 4/13/2020 1:17 AM    Workstation Name: [facility]             Medication List      No changes were made to your prescriptions during this visit.            Luis Gonzalez MD  04/13/20 0147       Luis Gonzalez MD  04/13/20 0202

## 2020-04-14 VITALS
BODY MASS INDEX: 53.92 KG/M2 | DIASTOLIC BLOOD PRESSURE: 82 MMHG | OXYGEN SATURATION: 97 % | HEIGHT: 62 IN | RESPIRATION RATE: 20 BRPM | HEART RATE: 69 BPM | TEMPERATURE: 97.9 F | WEIGHT: 293 LBS | SYSTOLIC BLOOD PRESSURE: 162 MMHG

## 2020-04-14 LAB
ANION GAP SERPL CALCULATED.3IONS-SCNC: 10.6 MMOL/L (ref 5–15)
BASOPHILS # BLD AUTO: 0.06 10*3/MM3 (ref 0–0.2)
BASOPHILS NFR BLD AUTO: 0.7 % (ref 0–1.5)
BUN BLD-MCNC: 12 MG/DL (ref 8–23)
BUN/CREAT SERPL: 16 (ref 7–25)
CALCIUM SPEC-SCNC: 8.6 MG/DL (ref 8.6–10.5)
CHLORIDE SERPL-SCNC: 101 MMOL/L (ref 98–107)
CO2 SERPL-SCNC: 29.4 MMOL/L (ref 22–29)
CREAT BLD-MCNC: 0.75 MG/DL (ref 0.57–1)
DEPRECATED RDW RBC AUTO: 50.7 FL (ref 37–54)
EOSINOPHIL # BLD AUTO: 0.24 10*3/MM3 (ref 0–0.4)
EOSINOPHIL NFR BLD AUTO: 2.9 % (ref 0.3–6.2)
ERYTHROCYTE [DISTWIDTH] IN BLOOD BY AUTOMATED COUNT: 14.5 % (ref 12.3–15.4)
GFR SERPL CREATININE-BSD FRML MDRD: 78 ML/MIN/1.73
GLUCOSE BLD-MCNC: 101 MG/DL (ref 65–99)
HCT VFR BLD AUTO: 39.7 % (ref 34–46.6)
HGB BLD-MCNC: 12.8 G/DL (ref 12–15.9)
IMM GRANULOCYTES # BLD AUTO: 0.03 10*3/MM3 (ref 0–0.05)
IMM GRANULOCYTES NFR BLD AUTO: 0.4 % (ref 0–0.5)
LYMPHOCYTES # BLD AUTO: 2.65 10*3/MM3 (ref 0.7–3.1)
LYMPHOCYTES NFR BLD AUTO: 32.2 % (ref 19.6–45.3)
MCH RBC QN AUTO: 30.9 PG (ref 26.6–33)
MCHC RBC AUTO-ENTMCNC: 32.2 G/DL (ref 31.5–35.7)
MCV RBC AUTO: 95.9 FL (ref 79–97)
MONOCYTES # BLD AUTO: 1.03 10*3/MM3 (ref 0.1–0.9)
MONOCYTES NFR BLD AUTO: 12.5 % (ref 5–12)
NEUTROPHILS # BLD AUTO: 4.23 10*3/MM3 (ref 1.7–7)
NEUTROPHILS NFR BLD AUTO: 51.3 % (ref 42.7–76)
NRBC BLD AUTO-RTO: 0 /100 WBC (ref 0–0.2)
PLATELET # BLD AUTO: 258 10*3/MM3 (ref 140–450)
PMV BLD AUTO: 11.1 FL (ref 6–12)
POTASSIUM BLD-SCNC: 3.7 MMOL/L (ref 3.5–5.2)
RBC # BLD AUTO: 4.14 10*6/MM3 (ref 3.77–5.28)
SODIUM BLD-SCNC: 141 MMOL/L (ref 136–145)
WBC NRBC COR # BLD: 8.24 10*3/MM3 (ref 3.4–10.8)

## 2020-04-14 PROCEDURE — 96376 TX/PRO/DX INJ SAME DRUG ADON: CPT

## 2020-04-14 PROCEDURE — 99217 PR OBSERVATION CARE DISCHARGE MANAGEMENT: CPT | Performed by: INTERNAL MEDICINE

## 2020-04-14 PROCEDURE — 80048 BASIC METABOLIC PNL TOTAL CA: CPT | Performed by: NURSE PRACTITIONER

## 2020-04-14 PROCEDURE — G0378 HOSPITAL OBSERVATION PER HR: HCPCS

## 2020-04-14 PROCEDURE — 63710000001 AZATHIOPRINE PER 50 MG: Performed by: INTERNAL MEDICINE

## 2020-04-14 PROCEDURE — 99212 OFFICE O/P EST SF 10 MIN: CPT | Performed by: SURGERY

## 2020-04-14 PROCEDURE — 85025 COMPLETE CBC W/AUTO DIFF WBC: CPT | Performed by: NURSE PRACTITIONER

## 2020-04-14 RX ORDER — PANTOPRAZOLE SODIUM 40 MG/1
40 TABLET, DELAYED RELEASE ORAL EVERY MORNING
Status: DISCONTINUED | OUTPATIENT
Start: 2020-04-14 | End: 2020-04-14 | Stop reason: HOSPADM

## 2020-04-14 RX ORDER — LEVOTHYROXINE SODIUM 0.05 MG/1
50 TABLET ORAL DAILY
Status: DISCONTINUED | OUTPATIENT
Start: 2020-04-14 | End: 2020-04-14 | Stop reason: HOSPADM

## 2020-04-14 RX ORDER — HYDROCORTISONE ACETATE 25 MG/1
25 SUPPOSITORY RECTAL 2 TIMES DAILY
Status: DISCONTINUED | OUTPATIENT
Start: 2020-04-14 | End: 2020-04-14 | Stop reason: HOSPADM

## 2020-04-14 RX ORDER — AZATHIOPRINE 50 MG/1
225 TABLET ORAL DAILY
Status: DISCONTINUED | OUTPATIENT
Start: 2020-04-14 | End: 2020-04-14 | Stop reason: HOSPADM

## 2020-04-14 RX ORDER — HYDROCORTISONE ACETATE 25 MG/1
25 SUPPOSITORY RECTAL 2 TIMES DAILY PRN
Qty: 10 SUPPOSITORY | Refills: 0 | Status: SHIPPED | OUTPATIENT
Start: 2020-04-14 | End: 2020-04-19

## 2020-04-14 RX ADMIN — FLUTICASONE PROPIONATE 2 SPRAY: 50 SPRAY, METERED NASAL at 09:03

## 2020-04-14 RX ADMIN — AZATHIOPRINE 225 MG: 50 TABLET ORAL at 09:11

## 2020-04-14 RX ADMIN — LEVOTHYROXINE SODIUM ANHYDROUS 25 MCG: 100 INJECTION, POWDER, LYOPHILIZED, FOR SOLUTION INTRAVENOUS at 06:17

## 2020-04-14 RX ADMIN — FAMOTIDINE 20 MG: 10 INJECTION INTRAVENOUS at 09:11

## 2020-04-14 RX ADMIN — SODIUM CHLORIDE, PRESERVATIVE FREE 10 ML: 5 INJECTION INTRAVENOUS at 09:04

## 2020-04-14 RX ADMIN — LEVOTHYROXINE SODIUM 50 MCG: 50 TABLET ORAL at 09:03

## 2020-04-14 RX ADMIN — PANTOPRAZOLE SODIUM 40 MG: 40 TABLET, DELAYED RELEASE ORAL at 09:03

## 2020-04-14 NOTE — PROGRESS NOTES
HTN  - Bp pills have been held, slightly low overnight, has been on fluids, but also tolerating clear liquid diet  - HR charted as 200's appears to be error, Night nurse rechecked 9 minutes later and HR was 60's, Pt on tele and high heart rate was not noted on monitor.    Crohn's  - nothing active, GI saw, rectal bleeding, start anusol    Incarcerated hernia  - Improved with reduction, appears surgery will be otpt, may be urgent and not elective, will defer timing to surgery and risk benefit given cessation of elective procedures during the pandemic  - Advance diet and DC today?    KELLY  - Did not require any O2 overnight

## 2020-04-14 NOTE — PROGRESS NOTES
Daily Progress Note    Chief Complaint: following abdominal pain    Subjective     Patient feels much better and is tolerating her diet.  Patient no longer has abdominal pain and is no longer having nausea and vomiting.  Objective     Vital signs in last 24 hours:  Temp:  [97.8 °F (36.6 °C)-98.2 °F (36.8 °C)] 98.2 °F (36.8 °C)  Heart Rate:  [] 65  Resp:  [20] 20  BP: ()/(61-75) 132/75    Intake/Output last 3 shifts:  I/O last 3 completed shifts:  In: 3245 [P.O.:480; I.V.:2765]  Out: 1900 [Urine:1700; Emesis/NG output:200]    Physical Exam:  Respiratory: CTA, normal inspiratory effort  CV: RRR, no JVD  Abd: Positive BS, soft, ND, NT, no rebound, no guarding reducible incisional hernia  Ext:  No cyanosis, no edema  Results from last 7 days   Lab Units 04/14/20  0355   WBC 10*3/mm3 8.24   HEMOGLOBIN g/dL 12.8   HEMATOCRIT % 39.7   PLATELETS 10*3/mm3 258     Results from last 7 days   Lab Units 04/14/20  0355   SODIUM mmol/L 141   POTASSIUM mmol/L 3.7   CHLORIDE mmol/L 101   CO2 mmol/L 29.4*   BUN mg/dL 12   CREATININE mg/dL 0.75   GLUCOSE mg/dL 101*   CALCIUM mg/dL 8.6       Assessment/Plan   Reducible incisional hernia  Symptoms have resolved  Patient may be discharged home  Follow-up as needed    Hortensia Contreras MD  General, Minimally Invasive and Endoscopic Surgery  [facility]    [facility] [address] [place] [address]    [address]  [place], [place] [address] [place] [id number]    P: [phone]  F: [phone]    Cc:  Yolanda Marshall, PA

## 2020-04-14 NOTE — PLAN OF CARE
Problem: Patient Care Overview  Goal: Plan of Care Review  Outcome: Ongoing (interventions implemented as appropriate)  Flowsheets  Taken 4/14/2020 0509  Progress: improving  Outcome Summary: no c/o of pain or nausea - IV at 75ml/hr - tolerating clear liquid diet - home CPAP used - VSS - rested well through the night  Taken 4/13/2020 1930  Plan of Care Reviewed With: patient

## 2020-04-14 NOTE — DISCHARGE SUMMARY
"Susanne Harper  1955  [id number]      Hospitalists Discharge Summary    Date of Admission: 4/12/2020  Date of Discharge:  4/14/2020    Primary Discharge Diagnoses: Partial small bowel obstruction due to incarcerated ventral hernia    Secondary Discharge Diagnoses:   Rectal bleeding from hemorrhoids  Acute hypoxic respiratory failure likely due to to KELLY  Essential hypertension  Super morbid obesity  Hypothyroidism  Crohn's disease without acute exacerbation  Colonic diverticulosis  Glaucoma  Trace hematuria  Dyslipidemia  Chronic knee pain due to arthritis    PCP  Patient Care Team:  Yolanda Marshall PA as PCP - General (Physician Assistant)    Consults:   Consults     Date and Time Order Name Status Description    4/13/2020 0301 Inpatient Gastroenterology Consult Completed     4/13/2020 0229 Inpatient General Surgery Consult            CC:  Nausea, vomiting, abdominal pain    History of Present Illness:  As per H&P: \"The patient is a 64-year-old female that presented through the emergency department secondary to right mid abdominal pain that she notes has gone on throughout the day but became acutely worsened at approximately 6 PM tonight and was associated with multiple episodes of nausea, vomiting that did not improve her symptoms.  She notes since placement of NG tube in ER she has still vomited and remains nauseated with moderate abdominal pain.  She further reports 2 days of bloody stools with a history of Crohn's disease and hemorrhoids and feels that there was a significant amount of blood in the commode today with BM at 2 PM.  She reports past \"episodes\" of the same area abdominal pain that resolved with soft food diet or change to liquids for a period of time.  She does not feel that she has passed flatus since 2 PM.     She has a known history of Crohn's disease (previous patient of Dr. Greenwood, now Dr. Cazares), diverticulosis, GERD, glaucoma, hypothyroidism, hypertension, ventral hernia with " "repair, dyslipidemia, chronic ankle and foot swelling.      Surgeries include:   Laparascopic repair incarcerated umbilical hernia 12/18/2015 per Dr. Tang  Ex lap/partial omentectomy 2/2 postoperative intraperitoneal omental bleed 12/19/2015 per Dr. Tang  Open incisional hernia repair per Dr. Logan 11/30/2016\"    Hospital Course    Evaluated by Dr. Contreras, he was able to reduce her hernia manually.  Tolerated clear liquid diet overnight, tolerated GI soft diet for lunch, patient to follow-up with general surgery as needed for elective hernia repair, timing likely after COVID pandemic resolves allowing for elective surgeries.  Though we will reevaluate if patient has multiple readmissions which change risk-benefit ratio.    Evaluated by GI by Dr. Cazares, no active Crohn's disease, scant bright red blood per rectum most likely hemorrhoids, most recent colonoscopy January of this year.  Has started Anusol    On admission started on 3 L nasal cannula due to hypoxia observed in the ER.  Is able to wean to room air yesterday morning.  Did not require further supplemental oxygen.    Appears Med Rec was not carefully updated on Admission, pt is to continue taking whatever medications she has been on previously, only addition/change has been a short course of anusol for hemorrhoids.     Physical Exam at Discharge  Vital Signs  Temp:  [97.8 °F (36.6 °C)-98.2 °F (36.8 °C)] 98.2 °F (36.8 °C)  Heart Rate:  [] 65  Resp:  [20] 20  BP: ()/(61-75) 132/75    Physical Exam:  Physical Exam   Constitutional: She is oriented to person, place, and time. No distress.   HENT:   Head: Normocephalic and atraumatic.   Eyes: Pupils are equal, round, and reactive to light. Conjunctivae and EOM are normal.   Cardiovascular: Normal rate, regular rhythm and normal heart sounds. Exam reveals no friction rub.   No murmur heard.  Pulmonary/Chest: Effort normal and breath sounds normal. No stridor. No respiratory distress. She has no " wheezes. She has no rales.   Abdominal: Soft. Bowel sounds are normal. She exhibits no distension. There is no tenderness. There is no guarding.   Mild tenderness in the RUQ   Musculoskeletal: She exhibits no edema.   Neurological: She is alert and oriented to person, place, and time. No cranial nerve deficit.   Skin: Skin is warm and dry. She is not diaphoretic. No erythema.   Psychiatric: She has a normal mood and affect. Her behavior is normal.   Nursing note and vitals reviewed.      Operations and Procedures Performed:        Allergies:  is allergic to codeine and penicillins.    Job  not reviewed    Discharge Medications:     Discharge Medications      New Medications      Instructions Start Date   hydrocortisone 25 MG suppository  Commonly known as:  ANUSOL-HC   25 mg, Rectal, 2 Times Daily PRN         Continue These Medications      Instructions Start Date   azaTHIOprine 50 MG tablet  Commonly known as:  IMURAN   TAKE FOUR AND ONE-HALF TABLETS BY MOUTH EVERY DAY      fluticasone 50 MCG/ACT nasal spray  Commonly known as:  FLONASE   2 sprays, Nasal, Daily      latanoprost 0.005 % ophthalmic solution  Commonly known as:  XALATAN   No dose, route, or frequency recorded.      levothyroxine 50 MCG tablet  Commonly known as:  SYNTHROID, LEVOTHROID   50 mcg, Oral, Daily      lisinopril-hydrochlorothiazide 20-12.5 MG per tablet  Commonly known as:  PRINZIDE,ZESTORETIC   1 tablet, Oral, 2 Times Daily Before Meals      omeprazole 20 MG capsule  Commonly known as:  priLOSEC   20 mg, Oral, Daily      pravastatin 80 MG tablet  Commonly known as:  PRAVACHOL   No dose, route, or frequency recorded.      sertraline 100 MG tablet  Commonly known as:  ZOLOFT   100 mg, Oral, Nightly         Stop These Medications    Biotin 10 MG tablet     CALCIUM 600 + D PO     celecoxib 200 MG capsule  Commonly known as:  CeleBREX     fish oil 1000 MG capsule capsule     Glucosamine-Chondroitin 750-600 MG tablet     hydroCHLOROthiazide 25  MG tablet  Commonly known as:  HYDRODIURIL     methocarbamol 750 MG tablet  Commonly known as:  ROBAXIN     MULTI VITAMIN DAILY PO     vitamin b complex capsule capsule     vitamin C 250 MG tablet  Commonly known as:  ASCORBIC ACID     Vitamin D3 50 MCG (2000 UT) capsule            Last Lab Results:   Lab Results (last 72 hours)     Procedure Component Value Units Date/Time    Basic Metabolic Panel [[id number]]  (Abnormal) Collected:  04/14/20 0355    Specimen:  Blood Updated:  04/14/20 0539     Glucose 101 mg/dL      BUN 12 mg/dL      Creatinine 0.75 mg/dL      Sodium 141 mmol/L      Potassium 3.7 mmol/L      Chloride 101 mmol/L      CO2 29.4 mmol/L      Calcium 8.6 mg/dL      eGFR Non African Amer 78 mL/min/1.73      BUN/Creatinine Ratio 16.0     Anion Gap 10.6 mmol/L     Narrative:       GFR Normal >60  Chronic Kidney Disease <60  Kidney Failure <15      CBC & Differential [[id number]] Collected:  04/14/20 0355    Specimen:  Blood Updated:  04/14/20 0524    Narrative:       The following orders were created for panel order CBC & Differential.  Procedure                               Abnormality         Status                     ---------                               -----------         ------                     CBC Auto Differential[[id number]]        Abnormal            Final result                 Please view results for these tests on the individual orders.    CBC Auto Differential [[id number]]  (Abnormal) Collected:  04/14/20 0355    Specimen:  Blood Updated:  04/14/20 0524     WBC 8.24 10*3/mm3      RBC 4.14 10*6/mm3      Hemoglobin 12.8 g/dL      Hematocrit 39.7 %      MCV 95.9 fL      MCH 30.9 pg      MCHC 32.2 g/dL      RDW 14.5 %      RDW-SD 50.7 fl      MPV 11.1 fL      Platelets 258 10*3/mm3      Neutrophil % 51.3 %      Lymphocyte % 32.2 %      Monocyte % 12.5 %      Eosinophil % 2.9 %      Basophil % 0.7 %      Immature Grans % 0.4 %      Neutrophils, Absolute 4.23 10*3/mm3      Lymphocytes, Absolute  2.65 10*3/mm3      Monocytes, Absolute 1.03 10*3/mm3      Eosinophils, Absolute 0.24 10*3/mm3      Basophils, Absolute 0.06 10*3/mm3      Immature Grans, Absolute 0.03 10*3/mm3      nRBC 0.0 /100 WBC     Protime-INR [[id number]]  (Normal) Collected:  04/13/20 0429    Specimen:  Blood Updated:  04/13/20 0535     Protime 13.3 Seconds      INR 1.04    Narrative:       Therapeutic Ranges for INR: 2.0-3.0 (PT 20-30)                              2.5-3.5 (PT 25-34)    TSH [[id number]]  (Normal) Collected:  04/12/20 2321    Specimen:  Blood Updated:  04/13/20 0342     TSH 3.240 uIU/mL     Hemoglobin A1c [[id number]]  (Abnormal) Collected:  04/12/20 2321    Specimen:  Blood Updated:  04/13/20 0335     Hemoglobin A1C 5.80 %     Narrative:       Hemoglobin A1C Ranges:    Increased Risk for Diabetes  5.7% to 6.4%  Diabetes                     >= 6.5%  Diabetic Goal                < 7.0%    Urinalysis, Microscopic Only - Urine, Clean Catch [[id number]]  (Abnormal) Collected:  04/13/20 0006    Specimen:  Urine, Clean Catch Updated:  04/13/20 0014     RBC, UA 6-12 /HPF      WBC, UA 3-5 /HPF      Bacteria, UA 1+ /HPF      Squamous Epithelial Cells, UA 3-6 /HPF      Hyaline Casts, UA None Seen /LPF      Methodology Manual Light Microscopy    Urinalysis With Microscopic If Indicated (No Culture) - Urine, Clean Catch [[id number]]  (Abnormal) Collected:  04/13/20 0006    Specimen:  Urine, Clean Catch Updated:  04/13/20 0014     Color, UA Yellow     Appearance, UA Clear     pH, UA 7.5     Specific Gravity, UA 1.025     Glucose, UA Negative     Ketones, UA Negative     Bilirubin, UA Negative     Blood, UA Trace     Protein, UA Negative     Leuk Esterase, UA Negative     Nitrite, UA Negative     Urobilinogen, UA 0.2 E.U./dL    Comprehensive Metabolic Panel [[id number]]  (Abnormal) Collected:  04/12/20 2321    Specimen:  Blood Updated:  04/12/20 2343     Glucose 157 mg/dL      BUN 19 mg/dL      Creatinine 0.89 mg/dL      Sodium 136 mmol/L       Potassium 3.7 mmol/L      Chloride 95 mmol/L      CO2 27.8 mmol/L      Calcium 9.5 mg/dL      Total Protein 8.2 g/dL      Albumin 4.20 g/dL      ALT (SGPT) 21 U/L      AST (SGOT) 33 U/L      Alkaline Phosphatase 83 U/L      Total Bilirubin 0.7 mg/dL      eGFR Non African Amer 64 mL/min/1.73      Globulin 4.0 gm/dL      A/G Ratio 1.1 g/dL      BUN/Creatinine Ratio 21.3     Anion Gap 13.2 mmol/L     Narrative:       GFR Normal >60  Chronic Kidney Disease <60  Kidney Failure <15      Lipase [[id number]]  (Normal) Collected:  04/12/20 2321    Specimen:  Blood Updated:  04/12/20 2343     Lipase 24 U/L     CBC & Differential [[id number]] Collected:  04/12/20 2321    Specimen:  Blood Updated:  04/12/20 2327    Narrative:       The following orders were created for panel order CBC & Differential.  Procedure                               Abnormality         Status                     ---------                               -----------         ------                     CBC Auto Differential[[id number]]        Abnormal            Final result                 Please view results for these tests on the individual orders.    CBC Auto Differential [[id number]]  (Abnormal) Collected:  04/12/20 2321    Specimen:  Blood Updated:  04/12/20 2327     WBC 10.32 10*3/mm3      RBC 5.07 10*6/mm3      Hemoglobin 15.5 g/dL      Hematocrit 47.6 %      MCV 93.9 fL      MCH 30.6 pg      MCHC 32.6 g/dL      RDW 14.0 %      RDW-SD 48.2 fl      MPV 10.4 fL      Platelets 304 10*3/mm3      Neutrophil % 84.7 %      Lymphocyte % 7.8 %      Monocyte % 5.9 %      Eosinophil % 0.4 %      Basophil % 0.6 %      Immature Grans % 0.6 %      Neutrophils, Absolute 8.75 10*3/mm3      Lymphocytes, Absolute 0.80 10*3/mm3      Monocytes, Absolute 0.61 10*3/mm3      Eosinophils, Absolute 0.04 10*3/mm3      Basophils, Absolute 0.06 10*3/mm3      Immature Grans, Absolute 0.06 10*3/mm3      nRBC 0.0 /100 WBC         Ct Abdomen Pelvis With Contrast    Result Date:  4/13/2020  Narrative: CT Abdomen Pelvis W INDICATION: Abdominal pain and vomiting with known abdominal wall hernia. Symptoms started at 8:00 AM yesterday. TECHNIQUE: CT of the abdomen and pelvis with IV and oral contrast. Coronal and sagittal reconstructions were obtained.  Radiation dose reduction techniques included automated exposure control or exposure modulation based on body size. Count of known CT and cardiac nuc med studies performed in previous 12 months: 2. COMPARISON: 10/27/2016 FINDINGS: There is streak artifact due to body habitus. The patient's body wall is contacting the CT gantry. There is evidence of air-trapping in the lower lung zones, similar to prior suggesting small airways disease. Gallbladder is grossly normal although not well characterized. There is no biliary obstruction. Solid abdominal organs are within normal limits. Urinary bladder is relatively decompressed but grossly normal. Uterus is surgically absent. The appendix is surgically absent as well. There is trace nonspecific free fluid in the dependent pelvis. There is mild colonic diverticulosis without evidence of discrete diverticulitis. There are multiple midline ventral wall hernias. The most cephalad contains only abdominal fat. This is just anterior to the stomach. There are 2 additional midline ventral wall hernias below the above-mentioned hernia. The most cephalad of these contains a small bowel loop just to the right of midline. This is new or worsened from prior. An additional ventral wall hernia in the midline below the above-mentioned contains at least 2 loops of small bowel. The adjacent small bowel loops appear mildly dilated and fluid-filled suggesting that at least one of these hernias is an incarcerating. However, this is not completely obstructing as there is contrast in the ileal loops within the pelvis. No free air is identified. There is diffuse degenerative disease in the visualized spine.     Impression: 1.  Multiple ventral wall hernias as above that have worsened from prior. They contain loops of small bowel. At least one of these is causing partial obstruction. Complete obstruction is not identified as there is oral contrast in distal ileal loops within the pelvis. 2. Colonic diverticulosis without diverticulitis. 3. Trace nonspecific free fluid in the pelvis. 4. Appendectomy and hysterectomy. 5. Exam degraded by artifact from body habitus. Signer Name: Ashok Munguia MD  Signed: 4/13/2020 1:17 AM  Workstation Name: [email or web address]  [facility]      Condition on Discharge:  Good    Discharge Disposition  To Home    Visiting Nurse:    No     Home PT/OT:  No     Home Safety Evaluation:  No     DME  None    Discharge Diet:      Dietary Orders (From admission, onward)     Start     Ordered    04/14/20 1009  Diet Regular; GI Soft  Diet Effective Now     Question Answer Comment   Diet Texture / Consistency Regular    Common Modifiers GI Soft        04/14/20 1008                Activity at Discharge:  activity as tolerated      Pre-discharge education      Follow-up Appointments  Future Appointments   Date Time Provider Department Center   9/29/2020  3:00 PM Hang Roberts MD [facility] None     Additional Instructions for the Follow-ups that You Need to Schedule     Discharge Follow-up with PCP   As directed       Currently Documented PCP:    Yolanda Marshall PA    PCP Phone Number:    [phone]     Follow Up Details:  2 Weeks         Discharge Follow-up with Specified Provider: Dr. Contreras; 3 Months   As directed      To:  Dr. Contreras    Follow Up:  3 Months    Follow Up Details:  Elective Hernia Repair               Test Results Pending at Discharge       Tobi Espinosa MD  04/14/20  14:58    Time: Discharge < 30 min (if over 30 minutes give explanation as to why it took greater than 30 minutes)

## 2020-04-15 ENCOUNTER — READMISSION MANAGEMENT (OUTPATIENT)
Dept: CALL CENTER | Facility: HOSPITAL | Age: 65
End: 2020-04-15

## 2020-04-15 NOTE — OUTREACH NOTE
Prep Survey      Responses   [facility] facility patient discharged from?  [place]   Is LACE score < 7 ?  Yes   Eligibility  Readm Mgmt   Discharge diagnosis  incarcerated ventral hernia,  SBO   COVID-19 Test Status  Not tested   Does the patient have one of the following disease processes/diagnoses(primary or secondary)?  Other   Does the patient have Home health ordered?  No   Is there a DME ordered?  No   Comments regarding appointments  see AVS   Medication alerts for this patient  multiple changes   Prep survey completed?  Yes          Ciarra Simon RN

## 2020-04-16 ENCOUNTER — READMISSION MANAGEMENT (OUTPATIENT)
Dept: CALL CENTER | Facility: HOSPITAL | Age: 65
End: 2020-04-16

## 2020-04-16 NOTE — NURSING NOTE
Case Management Discharge Note      Final Note: Discharged home.    Provided Post Acute Provider List?: Refused  Refused Provider List Comment: pt declines community resources at this time.    Destination      No service has been selected for the patient.      Durable Medical Equipment      No service has been selected for the patient.      Dialysis/Infusion      No service has been selected for the patient.      Home Medical Care      No service has been selected for the patient.      Therapy      No service has been selected for the patient.      Community Resources      No service has been selected for the patient.             Final Discharge Disposition Code: 01 - home or self-care

## 2020-04-16 NOTE — OUTREACH NOTE
LAG < 7 Survey      Responses   [facility] patient discharged from?  [place]   COVID-19 Test Status  Not tested   Does the patient have one of the following disease processes/diagnoses(primary or secondary)?  Other   Is there a successful TCM telephone encounter documented?  No   BHLAG <7 Attempt successful?  Yes   Call start time  1224   Call end time  1228   Discharge diagnosis  incarcerated ventral hernia,  SBO   Meds reviewed with patient/caregiver?  Yes   Is the patient having any side effects they believe may be caused by any medication additions or changes?  No   Does the patient have all medications ordered at discharge?  Yes   Is the patient taking all medications as directed (includes completed medication regime)?  Yes   Does the patient have a primary care provider?   Yes   Does the patient have an appointment with their PCP within 7 days of discharge?  Yes [Televisit schedule for 4/20/20]   Has the patient kept scheduled appointments due by today?  Yes   Has home health visited the patient within 72 hours of discharge?  N/A   Psychosocial issues?  No   Did the patient receive a copy of their discharge instructions?  Yes   Nursing interventions  Reviewed instructions with patient   What is the patient's perception of their health status since discharge?  Improving   Is the patient/caregiver able to teach back signs and symptoms related to disease process for when to call PCP?  Yes   Is the patient/caregiver able to teach back signs and symptoms related to disease process for when to call 911?  Yes   Is the patient/caregiver able to teach back the hierarchy of who to call/visit for symptoms/problems? PCP, Specialist, Home health nurse, Urgent Care, ED, 911  Yes   Graduated  Yes          Kajal Salmon RN

## 2020-09-29 ENCOUNTER — OFFICE VISIT (OUTPATIENT)
Dept: SLEEP MEDICINE | Facility: HOSPITAL | Age: 65
End: 2020-09-29

## 2020-09-29 VITALS
HEART RATE: 63 BPM | DIASTOLIC BLOOD PRESSURE: 78 MMHG | BODY MASS INDEX: 53.92 KG/M2 | SYSTOLIC BLOOD PRESSURE: 139 MMHG | WEIGHT: 293 LBS | HEIGHT: 62 IN

## 2020-09-29 DIAGNOSIS — G47.33 OBSTRUCTIVE SLEEP APNEA: Primary | ICD-10-CM

## 2020-09-29 DIAGNOSIS — E66.9 SUPER OBESE: ICD-10-CM

## 2020-09-29 PROCEDURE — G0463 HOSPITAL OUTPT CLINIC VISIT: HCPCS

## 2020-09-29 NOTE — PROGRESS NOTES
[facility] SLEEP MEDICINE  [address]  [address]LO[address]  [place] [address]  [phone]    PCP: Yolanda Marshall PA    Reason for visit:  Sleep disorders: KELLY    Susanne is a 65 y.o.female who was seen in the Sleep Disorders Center today. Annual fu. She remains compliant with her device. She sleeps from 1-2 am to 9-10 am. She uses a nasal mask that fits well. No air leaks or dry mouth. She wakes up rested and refreshed.  [place] Sleepiness Scale is 7. Caffeine 1 per day. Alcohol 0 per week.    Susanne  reports that she has never smoked. She has never used smokeless tobacco.    Pertinent Positive Review of Systems of soa  Rest of Review of Systems was negative as recorded in Sleep Questionnaire.    Patient  has a past medical history of Arthritis, Colon polyp, Crohn's disease (CMS/HCC), Crohn's disease (CMS/HCC), GERD (gastroesophageal reflux disease), Glaucoma, H/O renal failure, High cholesterol, History of transfusion, Hypertension, Hypothyroidism, Respiratory failure, post-operative (CMS/HCC), Sleep apnea, Ventral hernia, and Vertigo.     Current Medications:    Current Outpatient Medications:   •  azaTHIOprine (IMURAN) 50 MG tablet, TAKE FOUR AND ONE-HALF TABLETS BY MOUTH EVERY DAY, Disp: 405 tablet, Rfl: 5  •  fluticasone (FLONASE) 50 MCG/ACT nasal spray, 2 sprays into the nostril(s) as directed by provider Daily., Disp: , Rfl:   •  latanoprost (XALATAN) 0.005 % ophthalmic solution, , Disp: , Rfl:   •  levothyroxine (SYNTHROID, LEVOTHROID) 50 MCG tablet, Take 50 mcg by mouth Daily., Disp: , Rfl:   •  lisinopril-hydrochlorothiazide (PRINZIDE,ZESTORETIC) 20-12.5 MG per tablet, Take 1 tablet by mouth 2 (Two) Times a Day Before Meals., Disp: , Rfl:   •  omeprazole (PriLOSEC) 20 MG capsule, Take 20 mg by mouth Daily., Disp: , Rfl:   •  pravastatin (PRAVACHOL) 80 MG tablet, , Disp: , Rfl:   •  sertraline (ZOLOFT) 100 MG tablet, Take 100 mg by mouth Every Night., Disp: , Rfl:    also entered in Sleep  "Questionnaire         Vital Signs: /78   Pulse 63   Ht 157.5 cm (62\")   Wt (!) 159 kg (350 lb)   BMI 64.02 kg/m²     Body mass index is 64.02 kg/m².       Tongue: Normal       Dentition: good       Pharynx: Posterior pharyngeal pillars are wide    Mallampatti: III (soft and hard palate and base of uvula visible)        General: Alert. Cooperative. Well developed. No acute distress.             Head:  Normocephalic. Symmetrical. Atraumatic.              Nose: No septal deviation. No drainage.          Throat: No oral lesions. No thrush. Moist mucous membranes.    Chest Wall:  Normal shape. Symmetric expansion with respiration. No tenderness.             Neck:  Trachea midline.           Lungs:  Clear to auscultation bilaterally. No wheezes. No rhonchi. No rales. Respirations regular, even and unlabored.            Heart:  Regular rhythm and normal rate. Normal S1 and S2. No murmur.     Abdomen:  Soft, non-tender and non-distended. Normal bowel sounds. No masses.  Extremities:  Moves all extremities well. No edema.    Psychiatric: Normal mood and affect.  She is in a wheelchair    Study:  · 5/22/17  Overnight split polysomnogram study that was completed as diagnostic only since sufficient severity of obstructive sleep apnea was not present.  Sleep efficiency was 68.7% with 4.44 hours of total sleep time.  AHI index is 5.2.  Sleep architecture was abnormal with absence of REM sleep.  Due to the absence of REM sleep severity may be underestimated.  Oxygen saturation remained above 89%.  PLM index increased to 69.6 with PLM arousal index of 4.1.  Snoring noted for 40.7% of total sleep time.  · 7/28/18 Nocox on CPAP 11cms: No significant desat    Testing:  · 100% compliance average usage 7 hours AHI 0.3 set pressure 11 cm    DME [employer]: [employer]    Impression:  1. Obstructive sleep apnea    2. Super obese        Plan:  Susanne is compliant with her CPAP and uses as prescribed.  Current pressure is effective.  She " sleeps an adequate number of hours with it.  She was reminded to change her supplies regularly.    I reiterated the importance of effective treatment of obstructive sleep apnea with PAP machine.  Cardiovascular health risks of untreated sleep apnea were again reviewed.  Patient was asked to remain cautious if there is persistent hypersomnolence. The benefit of weight loss in reducing severity of obstructive sleep apnea was discussed.  Patient would benefit from adhering to a strict diet to achieve ideal BMI.     Change of PAP supplies regularly is important for effective use.  Change of cushion on the mask or plugs on nasal pillows along with disposable filters once every month and change of mask frame, tubing, headgear and Velcro straps every 6 months at the minimum was reiterated.    This patient is compliant with PAP machine and benefits from its use.  Apnea hypopneas index is corrected/improved.  Daytime hypersomnolence has resolved.     Patient will follow up in this clinic in 1 year.    Thank you for allowing me to participate in your patient's care.    Electronically signed by Hang Roberts MD, 09/29/20, 3:30 PM EDT.    Part of this note may be an electronic transcription/translation of spoken language to printed text using the Dragon Dictation System.

## 2020-12-28 RX ORDER — AZATHIOPRINE 50 MG/1
TABLET ORAL
Qty: 405 TABLET | Refills: 5 | Status: SHIPPED | OUTPATIENT
Start: 2020-12-28 | End: 2022-02-15

## 2021-05-06 ENCOUNTER — TRANSCRIBE ORDERS (OUTPATIENT)
Dept: ADMINISTRATIVE | Facility: HOSPITAL | Age: 66
End: 2021-05-06

## 2021-05-06 DIAGNOSIS — R60.9 EDEMA, UNSPECIFIED TYPE: Primary | ICD-10-CM

## 2021-05-20 ENCOUNTER — HOSPITAL ENCOUNTER (OUTPATIENT)
Dept: CARDIOLOGY | Facility: HOSPITAL | Age: 66
Discharge: HOME | End: 2021-05-20
Admitting: PHYSICIAN ASSISTANT

## 2021-05-20 VITALS
HEART RATE: 80 BPM | HEIGHT: 62 IN | SYSTOLIC BLOOD PRESSURE: 154 MMHG | WEIGHT: 293 LBS | BODY MASS INDEX: 53.92 KG/M2 | DIASTOLIC BLOOD PRESSURE: 81 MMHG

## 2021-05-20 DIAGNOSIS — R60.9 EDEMA, UNSPECIFIED TYPE: ICD-10-CM

## 2021-05-20 LAB
BH CV ECHO MEAS - AO MAX PG: 16 MMHG
BH CV ECHO MEAS - AO MEAN PG (FULL): 4 MMHG
BH CV ECHO MEAS - AO MEAN PG: 7 MMHG
BH CV ECHO MEAS - AO V2 MAX: 199 CM/SEC
BH CV ECHO MEAS - AO V2 MEAN: 127 CM/SEC
BH CV ECHO MEAS - AO V2 VTI: 40.2 CM
BH CV ECHO MEAS - ASC AORTA: 3.4 CM
BH CV ECHO MEAS - AVA(I,A): 2.7 CM^2
BH CV ECHO MEAS - AVA(I,D): 2.7 CM^2
BH CV ECHO MEAS - BSA(HAYCOCK): 2.8 M^2
BH CV ECHO MEAS - BSA: 2.4 M^2
BH CV ECHO MEAS - BZI_BMI: 64.5 KILOGRAMS/M^2
BH CV ECHO MEAS - BZI_METRIC_HEIGHT: 157 CM
BH CV ECHO MEAS - BZI_METRIC_WEIGHT: 159 KG
BH CV ECHO MEAS - EDV(CUBED): 154 ML
BH CV ECHO MEAS - EDV(MOD-SP2): 136 ML
BH CV ECHO MEAS - EDV(MOD-SP4): 172 ML
BH CV ECHO MEAS - EDV(TEICH): 138.9 ML
BH CV ECHO MEAS - EF(CUBED): 62.7 %
BH CV ECHO MEAS - EF(MOD-BP): 61 %
BH CV ECHO MEAS - EF(MOD-SP2): 57.4 %
BH CV ECHO MEAS - EF(MOD-SP4): 66.4 %
BH CV ECHO MEAS - EF(TEICH): 53.7 %
BH CV ECHO MEAS - ESV(CUBED): 57.5 ML
BH CV ECHO MEAS - ESV(MOD-SP2): 57.9 ML
BH CV ECHO MEAS - ESV(MOD-SP4): 57.8 ML
BH CV ECHO MEAS - ESV(TEICH): 64.3 ML
BH CV ECHO MEAS - FS: 28 %
BH CV ECHO MEAS - IVS/LVPW: 1.1
BH CV ECHO MEAS - IVSD: 1.2 CM
BH CV ECHO MEAS - LAT PEAK E' VEL: 12 CM/SEC
BH CV ECHO MEAS - LV DIASTOLIC VOL/BSA (35-75): 71 ML/M^2
BH CV ECHO MEAS - LV MASS(C)D: 234.8 GRAMS
BH CV ECHO MEAS - LV MASS(C)DI: 97 GRAMS/M^2
BH CV ECHO MEAS - LV MAX PG: 7 MMHG
BH CV ECHO MEAS - LV MEAN PG: 3 MMHG
BH CV ECHO MEAS - LV SYSTOLIC VOL/BSA (12-30): 23.9 ML/M^2
BH CV ECHO MEAS - LV V1 MAX: 132 CM/SEC
BH CV ECHO MEAS - LV V1 MEAN: 83.8 CM/SEC
BH CV ECHO MEAS - LV V1 VTI: 28.2 CM
BH CV ECHO MEAS - LVIDD: 5.4 CM
BH CV ECHO MEAS - LVIDS: 3.9 CM
BH CV ECHO MEAS - LVLD AP2: 8.5 CM
BH CV ECHO MEAS - LVLD AP4: 8.6 CM
BH CV ECHO MEAS - LVLS AP2: 7.1 CM
BH CV ECHO MEAS - LVLS AP4: 6.7 CM
BH CV ECHO MEAS - LVOT AREA (M): 3.8 CM^2
BH CV ECHO MEAS - LVOT AREA: 3.8 CM^2
BH CV ECHO MEAS - LVOT DIAM: 2.2 CM
BH CV ECHO MEAS - LVPWD: 1 CM
BH CV ECHO MEAS - MED PEAK E' VEL: 10.3 CM/SEC
BH CV ECHO MEAS - MV A DUR: 151 SEC
BH CV ECHO MEAS - MV A MAX VEL: 115 CM/SEC
BH CV ECHO MEAS - MV DEC SLOPE: 340 CM/SEC^2
BH CV ECHO MEAS - MV DEC TIME: 248 SEC
BH CV ECHO MEAS - MV E MAX VEL: 70.4 CM/SEC
BH CV ECHO MEAS - MV E/A: 0.61
BH CV ECHO MEAS - MV MEAN PG: 1 MMHG
BH CV ECHO MEAS - MV P1/2T MAX VEL: 89.2 CM/SEC
BH CV ECHO MEAS - MV P1/2T: 76.8 MSEC
BH CV ECHO MEAS - MV V2 MEAN: 51.1 CM/SEC
BH CV ECHO MEAS - MV V2 VTI: 32.1 CM
BH CV ECHO MEAS - MVA P1/2T LCG: 2.5 CM^2
BH CV ECHO MEAS - MVA(P1/2T): 2.9 CM^2
BH CV ECHO MEAS - MVA(VTI): 3.3 CM^2
BH CV ECHO MEAS - PA ACC TIME: 0.07 SEC
BH CV ECHO MEAS - PA PR(ACCEL): 45.7 MMHG
BH CV ECHO MEAS - QP/QS: 1.3
BH CV ECHO MEAS - RAP SYSTOLE: 8 MMHG
BH CV ECHO MEAS - RV MEAN PG: 2 MMHG
BH CV ECHO MEAS - RV V1 MEAN: 61.2 CM/SEC
BH CV ECHO MEAS - RV V1 VTI: 19.1 CM
BH CV ECHO MEAS - RVOT AREA: 7.5 CM^2
BH CV ECHO MEAS - RVOT DIAM: 3.1 CM
BH CV ECHO MEAS - RVSP: 44 MMHG
BH CV ECHO MEAS - SI(CUBED): 39.9 ML/M^2
BH CV ECHO MEAS - SI(LVOT): 44.3 ML/M^2
BH CV ECHO MEAS - SI(MOD-SP2): 32.3 ML/M^2
BH CV ECHO MEAS - SI(MOD-SP4): 47.2 ML/M^2
BH CV ECHO MEAS - SI(TEICH): 30.8 ML/M^2
BH CV ECHO MEAS - SV(CUBED): 96.5 ML
BH CV ECHO MEAS - SV(LVOT): 107.2 ML
BH CV ECHO MEAS - SV(MOD-SP2): 78.1 ML
BH CV ECHO MEAS - SV(MOD-SP4): 114.2 ML
BH CV ECHO MEAS - SV(RVOT): 144.2 ML
BH CV ECHO MEAS - SV(TEICH): 74.6 ML
BH CV ECHO MEAS - TAPSE (>1.6): 3 CM
BH CV ECHO MEAS - TR MAX PG: 36 MMHG
BH CV ECHO MEAS - TR MAX VEL: 299 CM/SEC
BH CV ECHO MEASUREMENTS AVERAGE E/E' RATIO: 6.31
BH CV XLRA - RV BASE: 4 CM
BH CV XLRA - RV LENGTH: 8.3 CM
BH CV XLRA - TDI S': 22.2 CM/SEC
LEFT ATRIUM VOLUME INDEX: 24 ML/M2
MAXIMAL PREDICTED HEART RATE: 154 BPM
STRESS TARGET HR: 131 BPM

## 2021-05-20 PROCEDURE — 93306 TTE W/DOPPLER COMPLETE: CPT

## 2021-05-20 PROCEDURE — 25010000002 PERFLUTREN (DEFINITY) 8.476 MG IN SODIUM CHLORIDE (PF) 0.9 % 10 ML INJECTION: Performed by: PHYSICIAN ASSISTANT

## 2021-05-20 PROCEDURE — 93306 TTE W/DOPPLER COMPLETE: CPT | Performed by: INTERNAL MEDICINE

## 2021-05-20 RX ADMIN — SODIUM CHLORIDE 2 ML: 9 INJECTION INTRAMUSCULAR; INTRAVENOUS; SUBCUTANEOUS at 17:12

## 2021-06-07 ENCOUNTER — TELEPHONE (OUTPATIENT)
Dept: CARDIOLOGY | Facility: CLINIC | Age: 66
End: 2021-06-07

## 2021-06-07 NOTE — TELEPHONE ENCOUNTER
Per Joyce Garcia physicians called a left a voicemail at the main office. They wanted pt to be seen sooner than scheduled appt on 7/20/21. Spoke to pt and she would like to keep appt to see Dr. Choi at [facility].    Erika

## 2021-07-20 ENCOUNTER — OFFICE VISIT (OUTPATIENT)
Dept: CARDIOLOGY | Facility: CLINIC | Age: 66
End: 2021-07-20

## 2021-07-20 ENCOUNTER — LAB (OUTPATIENT)
Dept: LAB | Facility: HOSPITAL | Age: 66
End: 2021-07-20

## 2021-07-20 ENCOUNTER — TRANSCRIBE ORDERS (OUTPATIENT)
Dept: CT IMAGING | Facility: HOSPITAL | Age: 66
End: 2021-07-20

## 2021-07-20 VITALS
WEIGHT: 293 LBS | HEIGHT: 62 IN | DIASTOLIC BLOOD PRESSURE: 76 MMHG | HEART RATE: 64 BPM | SYSTOLIC BLOOD PRESSURE: 124 MMHG | BODY MASS INDEX: 53.92 KG/M2

## 2021-07-20 DIAGNOSIS — R60.9 PERIPHERAL EDEMA: Primary | ICD-10-CM

## 2021-07-20 DIAGNOSIS — E66.8 EXTREME OBESITY: ICD-10-CM

## 2021-07-20 DIAGNOSIS — R60.9 PERIPHERAL EDEMA: ICD-10-CM

## 2021-07-20 DIAGNOSIS — R06.02 SHORTNESS OF BREATH: ICD-10-CM

## 2021-07-20 DIAGNOSIS — R60.9 LIPEDEMA: ICD-10-CM

## 2021-07-20 DIAGNOSIS — I35.8 AORTIC VALVE SCLEROSIS: ICD-10-CM

## 2021-07-20 PROBLEM — K43.6 INCARCERATED VENTRAL HERNIA: Status: RESOLVED | Noted: 2020-04-13 | Resolved: 2021-07-20

## 2021-07-20 LAB
ANION GAP SERPL CALCULATED.3IONS-SCNC: 13.7 MMOL/L (ref 5–15)
BUN SERPL-MCNC: 30 MG/DL (ref 8–23)
BUN/CREAT SERPL: 29.4 (ref 7–25)
CALCIUM SPEC-SCNC: 9.7 MG/DL (ref 8.6–10.5)
CHLORIDE SERPL-SCNC: 100 MMOL/L (ref 98–107)
CO2 SERPL-SCNC: 27.3 MMOL/L (ref 22–29)
CREAT SERPL-MCNC: 1.02 MG/DL (ref 0.57–1)
GFR SERPL CREATININE-BSD FRML MDRD: 54 ML/MIN/1.73
GLUCOSE SERPL-MCNC: 89 MG/DL (ref 65–99)
NT-PROBNP SERPL-MCNC: 165.3 PG/ML (ref 0–900)
POTASSIUM SERPL-SCNC: 4.5 MMOL/L (ref 3.5–5.2)
SODIUM SERPL-SCNC: 141 MMOL/L (ref 136–145)

## 2021-07-20 PROCEDURE — 36415 COLL VENOUS BLD VENIPUNCTURE: CPT

## 2021-07-20 PROCEDURE — 83880 ASSAY OF NATRIURETIC PEPTIDE: CPT

## 2021-07-20 PROCEDURE — 80048 BASIC METABOLIC PNL TOTAL CA: CPT

## 2021-07-20 PROCEDURE — 99204 OFFICE O/P NEW MOD 45 MIN: CPT | Performed by: INTERNAL MEDICINE

## 2021-07-20 PROCEDURE — 93000 ELECTROCARDIOGRAM COMPLETE: CPT | Performed by: INTERNAL MEDICINE

## 2021-07-20 RX ORDER — CELECOXIB 200 MG/1
200 CAPSULE ORAL DAILY
COMMUNITY

## 2021-07-20 RX ORDER — TORSEMIDE 20 MG/1
20 TABLET ORAL DAILY
COMMUNITY
End: 2022-10-24

## 2021-07-20 RX ORDER — LEVOCETIRIZINE DIHYDROCHLORIDE 5 MG/1
5 TABLET, FILM COATED ORAL EVERY MORNING
COMMUNITY

## 2021-07-20 RX ORDER — METHOCARBAMOL 750 MG/1
750 TABLET, FILM COATED ORAL 2 TIMES DAILY
COMMUNITY
End: 2022-10-24

## 2021-07-20 RX ORDER — POTASSIUM CHLORIDE 1500 MG/1
20 TABLET, FILM COATED, EXTENDED RELEASE ORAL DAILY
COMMUNITY

## 2021-07-20 NOTE — PROGRESS NOTES
"Date of Office Visit: 21  Encounter Provider: Ketan Choi MD  Place of Service: [facility] CARDIOLOGY  Patient Name: Susanne Harper  :1955    Chief Complaint   Patient presents with   • Edema   • Palpitations   :     HPI:     Ms. Harper is 65 y.o. and presents today at the request of MERCEDES Naidu, for \"leg swelling.\"    She is morbidly obese. She is extremely limited in her mobility due to severe knee arthritis. She does ambulate with crutches at home, and is in a wheelchair for the office visit today.     She was previously on HCTZ. In early May, she reported increasing leg swelling. An echo reported normal LVSF, mild LVE/LAE, grade 1 diastolic dysfunction, aortic sclerosis, and mild PHTN.  Per my review -- the study is extraordinarily difficult to interpret. Her LV and LA are normal in size. Her RV is mildly dilated. LVSF is normal, and there is grade 1 diastolic dysfunction. The aortic valve is not well visualized and I don't appreciate any calcification. There is mild PHTN.    She has very rare, isolated palpitations that feel like a \"flip flop.\" She denies chest pain. She has treated sleep apnea. Her legs are very large.  She does feel they improved a little bit when she was switched from HCTZ to torsemide, 20mg daily, but it causes extremely frequent urination and makes it hard to leave her home.     Past Medical History:   Diagnosis Date   • Colon polyp    • Crohn's disease (CMS/HCC)    • GERD (gastroesophageal reflux disease)    • Glaucoma    • History of acute renal failure     after surgery   • History of transfusion    • Hyperlipidemia    • Hypertension    • Hypothyroidism    • Obstructive sleep apnea    • Osteoarthritis    • Respiratory failure, post-operative (CMS/HCC)     on vent x3 days 2015   • Sleep apnea     w/cpap   • Ventral hernia    • Vertigo        Past Surgical History:   Procedure Laterality Date   • COLONOSCOPY     • COLONOSCOPY N/A " 1/8/2020    Procedure: COLONOSCOPY , polypectomy with biopsies;  Surgeon: Shantal Greenwood MD;  Location: [facility] OR;  Service: Gastroenterology   • GANGLION CYST EXCISION     • HYSTERECTOMY     • OMENTECTOMY     • TONSILLECTOMY     • UMBILICAL HERNIA REPAIR  12/2015   • VENTRAL/INCISIONAL HERNIA REPAIR N/A 11/30/2016    Procedure: VENTRAL/INCISIONAL HERNIA REPAIR;  Surgeon: Sumit Logan MD;  Location: [facility] OR;  Service:        Social History     Socioeconomic History   • Marital status:      Spouse name: Not on file   • Number of children: 1   • Years of education: Not on file   • Highest education level: Not on file   Tobacco Use   • Smoking status: Never Smoker   • Smokeless tobacco: Never Used   Vaping Use   • Vaping Use: Never used   Substance and Sexual Activity   • Alcohol use: No   • Drug use: No   • Sexual activity: Defer       Family History   Problem Relation Age of Onset   • Arthritis Mother    • Diabetes Maternal Grandmother    • Glaucoma Maternal Grandmother    • Heart disease Maternal Grandfather    • Cancer Maternal Grandfather    • Heart disease Paternal Grandfather    • Congenital heart disease Sister    • Breast cancer Neg Hx    • Colon cancer Neg Hx    • Colon polyps Neg Hx        Review of Systems   Constitutional: Positive for weight gain.   Cardiovascular: Positive for palpitations.   Respiratory: Positive for shortness of breath.    Musculoskeletal: Positive for joint pain.   Gastrointestinal: Positive for abdominal pain.   All other systems reviewed and are negative.      Allergies   Allergen Reactions   • Codeine Nausea And Vomiting   • Penicillins Hives         Current Outpatient Medications:   •  azaTHIOprine (IMURAN) 50 MG tablet, TAKE FOUR AND ONE-HALF TABLETS BY MOUTH EVERY DAY, Disp: 405 tablet, Rfl: 5  •  B Complex Vitamins (VITAMIN B COMPLEX PO), Take  by mouth., Disp: , Rfl:   •  Calcium Carbonate-Vitamin D (CALTRATE 600+D PO), Take  by mouth Daily., Disp: , Rfl:  "  •  Calcium Citrate-Vitamin D (CALCIUM CITRATE +D PO), Take  by mouth Daily., Disp: , Rfl:   •  celecoxib (CeleBREX) 200 MG capsule, Take 200 mg by mouth Daily., Disp: , Rfl:   •  fluticasone (FLONASE) 50 MCG/ACT nasal spray, 2 sprays into the nostril(s) as directed by provider Daily., Disp: , Rfl:   •  latanoprost (XALATAN) 0.005 % ophthalmic solution, , Disp: , Rfl:   •  levocetirizine (XYZAL) 5 MG tablet, Take 5 mg by mouth Every Morning., Disp: , Rfl:   •  levothyroxine (SYNTHROID, LEVOTHROID) 50 MCG tablet, Take 50 mcg by mouth Daily., Disp: , Rfl:   •  lisinopril-hydrochlorothiazide (PRINZIDE,ZESTORETIC) 20-12.5 MG per tablet, Take 1 tablet by mouth 2 (Two) Times a Day Before Meals., Disp: , Rfl:   •  methocarbamol (ROBAXIN) 750 MG tablet, Take 750 mg by mouth 2 (two) times a day., Disp: , Rfl:   •  omeprazole (PriLOSEC) 20 MG capsule, Take 20 mg by mouth Daily., Disp: , Rfl:   •  potassium chloride (K-DUR,KLOR-CON) 20 MEQ tablet controlled-release ER tablet, Take 20 mEq by mouth Daily., Disp: , Rfl:   •  pravastatin (PRAVACHOL) 80 MG tablet, , Disp: , Rfl:   •  sertraline (ZOLOFT) 100 MG tablet, Take 100 mg by mouth Every Night., Disp: , Rfl:   •  torsemide (DEMADEX) 20 MG tablet, Take 20 mg by mouth Daily. As needed, Disp: , Rfl:       Objective:     Vitals:    07/20/21 1124   BP: 124/76   BP Location: Left arm   Pulse: 64   Weight: (!) 159 kg (350 lb)   Height: 157 cm (61.81\")     Body mass index is 64.41 kg/m².    Vitals reviewed.   Constitutional:       Appearance: Morbidly obese.      Comments: Obese   Eyes:      Conjunctiva/sclera: Conjunctivae normal.      Pupils: Pupils are equal, round, and reactive to light.   HENT:      Head: Normocephalic.      Nose: Nose normal.   Neck:      Comments: Cannot assess for JVD due to habitus  Pulmonary:      Effort: Pulmonary effort is normal.      Breath sounds: Normal breath sounds.   Cardiovascular:      Normal rate. Regular rhythm.      Murmurs: There is no " murmur.      Comments: Distant heart sounds  Pulses:     Intact distal pulses.   Abdominal:      Palpations: Abdomen is soft.      Tenderness: There is no abdominal tenderness.      Comments: Obesity limits abdominal exam   Musculoskeletal: Normal range of motion.         General: Deformity (marked round edema of the legs with abrupt normalization at the ankles) present.      Cervical back: Normal range of motion. Skin:     General: Skin is warm and dry.      Findings: No rash.   Neurological:      General: No focal deficit present.      Mental Status: Alert and oriented to person, place, and time.      Cranial Nerves: No cranial nerve deficit.   Psychiatric:         Behavior: Behavior normal.         Thought Content: Thought content normal.         Judgment: Judgment normal.           ECG 12 Lead    Date/Time: 7/20/2021 11:47 AM  Performed by: Ketan Choi MD  Authorized by: Ketan Choi MD   Comparison: compared with previous ECG   Similar to previous ECG  Rhythm: sinus rhythm  Conduction: conduction normal  ST Segments: ST segments normal  T Waves: T waves normal  QRS axis: normal  Other: no other findings    Clinical impression: normal ECG              Assessment:       Diagnosis Plan   1. Peripheral edema  ECG 12 Lead    Basic Metabolic Panel    proBNP    CT abdomen pelvis w wo contrast   2. Extreme obesity  Basic Metabolic Panel    proBNP    CT abdomen pelvis w wo contrast   3. Lipedema  Basic Metabolic Panel    proBNP    CT abdomen pelvis w wo contrast   4. Shortness of breath   proBNP    CT abdomen pelvis w wo contrast   5. Aortic valve sclerosis            Plan:       Mrs Harper's leg swelling is most consistent with lipedema, which is not a cardiac issue and is not due to congestive heart failure.  She has normal LV size and systolic function and grade 1 diastolic dysfunction, which is not associated with increased left sided filling pressures. She has mild RV dilation and mild PHTN which is likely due  "to obesity and KELLY.  I will get a proBNP and BMP. A low dose diuretic is reasonable, but I explained that overly aggressive attempts to diurese patients with lipedema and lymphedema will cause intravascular depletion without significant improvement clinically.    Given her history of Crohn's and prior pelvic surgery (hysterectomy) and subsequent hernia formation, I will get a CT to exclude central venous or lymphatic obstruction.     She may benefit from referral to OT/PT for lymphedema +/- plastic surgery for lipedema but I will defer this to her PCP.     Her echo was read as \"moderate aortic valve calcification/sclerosis.\" I do not really appreciate that, but the valve is profoundly poorly visualized. I don't hear a sclerosis murmur. We could repeat an echo in five years as long as there is no clinical change.    Sincerely,       Ketan Choi MD                "

## 2021-07-26 ENCOUNTER — HOSPITAL ENCOUNTER (OUTPATIENT)
Dept: CT IMAGING | Facility: HOSPITAL | Age: 66
End: 2021-07-26

## 2021-07-28 ENCOUNTER — HOSPITAL ENCOUNTER (OUTPATIENT)
Dept: CT IMAGING | Facility: HOSPITAL | Age: 66
End: 2021-07-28

## 2021-08-09 ENCOUNTER — HOSPITAL ENCOUNTER (OUTPATIENT)
Dept: CT IMAGING | Facility: HOSPITAL | Age: 66
Discharge: HOME | End: 2021-08-09
Admitting: INTERNAL MEDICINE

## 2021-08-09 DIAGNOSIS — E66.8 EXTREME OBESITY: ICD-10-CM

## 2021-08-09 DIAGNOSIS — R60.9 PERIPHERAL EDEMA: ICD-10-CM

## 2021-08-09 DIAGNOSIS — R06.02 SHORTNESS OF BREATH: ICD-10-CM

## 2021-08-09 DIAGNOSIS — R60.9 LIPEDEMA: ICD-10-CM

## 2021-08-09 PROCEDURE — 0 IOPAMIDOL PER 1 ML: Performed by: INTERNAL MEDICINE

## 2021-08-09 PROCEDURE — 74177 CT ABD & PELVIS W/CONTRAST: CPT

## 2021-08-09 RX ADMIN — IOPAMIDOL 100 ML: 755 INJECTION, SOLUTION INTRAVENOUS at 11:15

## 2021-08-16 ENCOUNTER — APPOINTMENT (OUTPATIENT)
Dept: CT IMAGING | Facility: HOSPITAL | Age: 66
End: 2021-08-16

## 2021-08-16 ENCOUNTER — HOSPITAL ENCOUNTER (EMERGENCY)
Facility: HOSPITAL | Age: 66
Discharge: HOME | End: 2021-08-17
Attending: EMERGENCY MEDICINE | Admitting: EMERGENCY MEDICINE

## 2021-08-16 DIAGNOSIS — K43.9 VENTRAL HERNIA WITHOUT OBSTRUCTION OR GANGRENE: Primary | ICD-10-CM

## 2021-08-16 LAB
ALBUMIN SERPL-MCNC: 4.2 G/DL (ref 3.5–5.2)
ALBUMIN/GLOB SERPL: 1.2 G/DL
ALP SERPL-CCNC: 83 U/L (ref 39–117)
ALT SERPL W P-5'-P-CCNC: 24 U/L (ref 1–33)
ANION GAP SERPL CALCULATED.3IONS-SCNC: 10.7 MMOL/L (ref 5–15)
AST SERPL-CCNC: 30 U/L (ref 1–32)
BASOPHILS # BLD AUTO: 0.05 10*3/MM3 (ref 0–0.2)
BASOPHILS NFR BLD AUTO: 0.5 % (ref 0–1.5)
BILIRUB SERPL-MCNC: 0.6 MG/DL (ref 0–1.2)
BILIRUB UR QL STRIP: NEGATIVE
BUN SERPL-MCNC: 20 MG/DL (ref 8–23)
BUN/CREAT SERPL: 21.1 (ref 7–25)
CALCIUM SPEC-SCNC: 9.7 MG/DL (ref 8.6–10.5)
CHLORIDE SERPL-SCNC: 97 MMOL/L (ref 98–107)
CLARITY UR: CLEAR
CO2 SERPL-SCNC: 28.3 MMOL/L (ref 22–29)
COLOR UR: YELLOW
CREAT SERPL-MCNC: 0.95 MG/DL (ref 0.57–1)
DEPRECATED RDW RBC AUTO: 50.7 FL (ref 37–54)
EOSINOPHIL # BLD AUTO: 0.07 10*3/MM3 (ref 0–0.4)
EOSINOPHIL NFR BLD AUTO: 0.7 % (ref 0.3–6.2)
ERYTHROCYTE [DISTWIDTH] IN BLOOD BY AUTOMATED COUNT: 14.1 % (ref 12.3–15.4)
GFR SERPL CREATININE-BSD FRML MDRD: 59 ML/MIN/1.73
GLOBULIN UR ELPH-MCNC: 3.4 GM/DL
GLUCOSE SERPL-MCNC: 120 MG/DL (ref 65–99)
GLUCOSE UR STRIP-MCNC: NEGATIVE MG/DL
HCT VFR BLD AUTO: 40.8 % (ref 34–46.6)
HGB BLD-MCNC: 13.3 G/DL (ref 12–15.9)
HGB UR QL STRIP.AUTO: NEGATIVE
IMM GRANULOCYTES # BLD AUTO: 0.05 10*3/MM3 (ref 0–0.05)
IMM GRANULOCYTES NFR BLD AUTO: 0.5 % (ref 0–0.5)
KETONES UR QL STRIP: ABNORMAL
LEUKOCYTE ESTERASE UR QL STRIP.AUTO: NEGATIVE
LYMPHOCYTES # BLD AUTO: 0.94 10*3/MM3 (ref 0.7–3.1)
LYMPHOCYTES NFR BLD AUTO: 9.2 % (ref 19.6–45.3)
MCH RBC QN AUTO: 32.2 PG (ref 26.6–33)
MCHC RBC AUTO-ENTMCNC: 32.6 G/DL (ref 31.5–35.7)
MCV RBC AUTO: 98.8 FL (ref 79–97)
MONOCYTES # BLD AUTO: 0.93 10*3/MM3 (ref 0.1–0.9)
MONOCYTES NFR BLD AUTO: 9.1 % (ref 5–12)
NEUTROPHILS NFR BLD AUTO: 8.19 10*3/MM3 (ref 1.7–7)
NEUTROPHILS NFR BLD AUTO: 80 % (ref 42.7–76)
NITRITE UR QL STRIP: NEGATIVE
NRBC BLD AUTO-RTO: 0 /100 WBC (ref 0–0.2)
PH UR STRIP.AUTO: 6.5 [PH] (ref 4.5–8)
PLATELET # BLD AUTO: 313 10*3/MM3 (ref 140–450)
PMV BLD AUTO: 10.3 FL (ref 6–12)
POTASSIUM SERPL-SCNC: 4.2 MMOL/L (ref 3.5–5.2)
PROT SERPL-MCNC: 7.6 G/DL (ref 6–8.5)
PROT UR QL STRIP: NEGATIVE
RBC # BLD AUTO: 4.13 10*6/MM3 (ref 3.77–5.28)
SODIUM SERPL-SCNC: 136 MMOL/L (ref 136–145)
SP GR UR STRIP: 1.01 (ref 1–1.03)
UROBILINOGEN UR QL STRIP: ABNORMAL
WBC # BLD AUTO: 10.23 10*3/MM3 (ref 3.4–10.8)

## 2021-08-16 PROCEDURE — 99283 EMERGENCY DEPT VISIT LOW MDM: CPT

## 2021-08-16 PROCEDURE — 99283 EMERGENCY DEPT VISIT LOW MDM: CPT | Performed by: EMERGENCY MEDICINE

## 2021-08-16 PROCEDURE — 74177 CT ABD & PELVIS W/CONTRAST: CPT

## 2021-08-16 PROCEDURE — 96360 HYDRATION IV INFUSION INIT: CPT

## 2021-08-16 PROCEDURE — 85025 COMPLETE CBC W/AUTO DIFF WBC: CPT | Performed by: EMERGENCY MEDICINE

## 2021-08-16 PROCEDURE — 81003 URINALYSIS AUTO W/O SCOPE: CPT | Performed by: EMERGENCY MEDICINE

## 2021-08-16 PROCEDURE — 0 IOPAMIDOL PER 1 ML: Performed by: EMERGENCY MEDICINE

## 2021-08-16 PROCEDURE — 80053 COMPREHEN METABOLIC PANEL: CPT | Performed by: EMERGENCY MEDICINE

## 2021-08-16 PROCEDURE — 0 DIATRIZOATE MEGLUMINE & SODIUM PER 1 ML: Performed by: EMERGENCY MEDICINE

## 2021-08-16 PROCEDURE — 96361 HYDRATE IV INFUSION ADD-ON: CPT

## 2021-08-16 RX ORDER — SODIUM CHLORIDE 9 MG/ML
250 INJECTION, SOLUTION INTRAVENOUS CONTINUOUS
Status: DISCONTINUED | OUTPATIENT
Start: 2021-08-16 | End: 2021-08-17 | Stop reason: HOSPADM

## 2021-08-16 RX ORDER — SODIUM CHLORIDE 0.9 % (FLUSH) 0.9 %
10 SYRINGE (ML) INJECTION AS NEEDED
Status: DISCONTINUED | OUTPATIENT
Start: 2021-08-16 | End: 2021-08-17 | Stop reason: HOSPADM

## 2021-08-16 RX ADMIN — IOPAMIDOL 100 ML: 755 INJECTION, SOLUTION INTRAVENOUS at 23:38

## 2021-08-16 RX ADMIN — SODIUM CHLORIDE 250 ML/HR: 9 INJECTION, SOLUTION INTRAVENOUS at 22:00

## 2021-08-16 RX ADMIN — DIATRIZOATE MEGLUMINE AND DIATRIZOATE SODIUM 30 ML: 600; 100 SOLUTION ORAL; RECTAL at 23:15

## 2021-08-17 VITALS
DIASTOLIC BLOOD PRESSURE: 78 MMHG | BODY MASS INDEX: 55.32 KG/M2 | SYSTOLIC BLOOD PRESSURE: 171 MMHG | WEIGHT: 293 LBS | RESPIRATION RATE: 22 BRPM | TEMPERATURE: 98.3 F | OXYGEN SATURATION: 93 % | HEART RATE: 91 BPM | HEIGHT: 61 IN

## 2021-08-17 NOTE — ED PROVIDER NOTES
Subjective     History provided by:  Patient    History of Present Illness    · Chief complaint: Abdominal pain    · Location: Mid right abdomen    · Quality/Severity: The patient has a hard night that is painful in her mid right abdomen.    · Timing/Onset: She woke this morning with discomfort.    · Modifying Factors: The patient has a history of abdominal wall hernias.    · Associated symptoms: She reports some nausea this afternoon but no vomiting.    · Narrative: The patient is a 66-year-old white female who states she woke this morning with a hard swollen knot in her right mid abdominal wall.  She states she is got a history of multiple abdominal wall hernias and has had 2 repairs in the past, one by Dr. Tang in 1 by Dr. Logan.  She states she became nauseated this afternoon but has not vomited.  She does report belching.  Her last bowel movement was this morning.    Review of Systems   Constitutional: Negative for activity change, appetite change, chills, diaphoresis, fatigue and fever.   HENT: Negative for congestion, dental problem, ear pain, hearing loss, mouth sores, postnasal drip, rhinorrhea, sinus pressure, sore throat and voice change.    Eyes: Negative for photophobia, pain, discharge, redness and visual disturbance.   Respiratory: Negative for cough, chest tightness, shortness of breath, wheezing and stridor.    Cardiovascular: Negative for chest pain, palpitations and leg swelling.   Gastrointestinal: Positive for abdominal pain and nausea. Negative for diarrhea and vomiting.        Belching   Genitourinary: Negative for difficulty urinating, dysuria, flank pain, frequency, hematuria and urgency.   Musculoskeletal: Negative for arthralgias, back pain, gait problem, joint swelling, myalgias, neck pain and neck stiffness.   Skin: Negative for color change and rash.   Neurological: Negative for dizziness, tremors, seizures, syncope, facial asymmetry, speech difficulty, weakness, light-headedness,  "numbness and headaches.   Hematological: Negative for adenopathy.   Psychiatric/Behavioral: Negative.  Negative for confusion and decreased concentration. The patient is not nervous/anxious.      Past Medical History:   Diagnosis Date   • Colon polyp    • Crohn's disease (CMS/HCC)    • GERD (gastroesophageal reflux disease)    • Glaucoma    • History of acute renal failure     after surgery   • History of transfusion    • Hyperlipidemia    • Hypertension    • Hypothyroidism    • Obstructive sleep apnea    • Osteoarthritis    • Respiratory failure, post-operative (CMS/HCC)     on vent x3 days 12/2015   • Sleep apnea     w/cpap   • Ventral hernia    • Vertigo      /78   Pulse 91   Temp 98.3 °F (36.8 °C) (Oral)   Resp 22   Ht 154.9 cm (61\")   Wt (!) 188 kg (415 lb)   SpO2 93%   BMI 78.41 kg/m²     Past Medical History:   Diagnosis Date   • Colon polyp    • Crohn's disease (CMS/HCC)    • GERD (gastroesophageal reflux disease)    • Glaucoma    • History of acute renal failure     after surgery   • History of transfusion    • Hyperlipidemia    • Hypertension    • Hypothyroidism    • Obstructive sleep apnea    • Osteoarthritis    • Respiratory failure, post-operative (CMS/HCC)     on vent x3 days 12/2015   • Sleep apnea     w/cpap   • Ventral hernia    • Vertigo        Allergies   Allergen Reactions   • Codeine Nausea And Vomiting   • Penicillins Hives       Past Surgical History:   Procedure Laterality Date   • COLONOSCOPY     • COLONOSCOPY N/A 1/8/2020    Procedure: COLONOSCOPY , polypectomy with biopsies;  Surgeon: Shantal Greenwood MD;  Location: [facility];  Service: Gastroenterology   • GANGLION CYST EXCISION     • HYSTERECTOMY     • OMENTECTOMY     • TONSILLECTOMY     • UMBILICAL HERNIA REPAIR  12/2015   • VENTRAL/INCISIONAL HERNIA REPAIR N/A 11/30/2016    Procedure: VENTRAL/INCISIONAL HERNIA REPAIR;  Surgeon: Sumit Logan MD;  Location: [facility] OR;  Service:        Family History   Problem " Relation Age of Onset   • Arthritis Mother    • Diabetes Maternal Grandmother    • Glaucoma Maternal Grandmother    • Heart disease Maternal Grandfather    • Cancer Maternal Grandfather    • Heart disease Paternal Grandfather    • Congenital heart disease Sister    • Breast cancer Neg Hx    • Colon cancer Neg Hx    • Colon polyps Neg Hx        Social History     Socioeconomic History   • Marital status:      Spouse name: Not on file   • Number of children: 1   • Years of education: Not on file   • Highest education level: Not on file   Tobacco Use   • Smoking status: Never Smoker   • Smokeless tobacco: Never Used   Vaping Use   • Vaping Use: Never used   Substance and Sexual Activity   • Alcohol use: Never   • Drug use: No   • Sexual activity: Defer           Objective   Physical Exam  Vitals and nursing note reviewed.   Constitutional:       General: She is not in acute distress.     Appearance: She is obese. She is not ill-appearing, toxic-appearing or diaphoretic.      Comments: The patient is morbidly obese.  She appears in no acute distress.  Review of her vital signs: She is afebrile with a temperature 98.3, respirations are slightly increased 22 with a normal room air oxygen saturation 93%, slightly tachycardic with a heart rate of 91, blood pressure elevated 175/78.   HENT:      Head: Normocephalic and atraumatic.      Mouth/Throat:      Mouth: Mucous membranes are moist.      Pharynx: Oropharynx is clear. No pharyngeal swelling or oropharyngeal exudate.   Eyes:      General: No scleral icterus.     Extraocular Movements: Extraocular movements intact.      Pupils: Pupils are equal, round, and reactive to light.   Cardiovascular:      Rate and Rhythm: Normal rate and regular rhythm.      Heart sounds: No murmur heard.     Pulmonary:      Effort: Pulmonary effort is normal.      Breath sounds: Normal breath sounds. No rhonchi.   Abdominal:      General: Bowel sounds are normal.      Palpations: Abdomen  is soft.      Tenderness: There is no right CVA tenderness, left CVA tenderness or rebound. Negative signs include Pardo's sign and McBurney's sign.      Comments: The patient has a abdominal wall hernia in the mid right abdomen that is tender but does not appear to be incarcerated.  The remainder of the abdomen is soft and nontender.   Skin:     General: Skin is warm and dry.      Capillary Refill: Capillary refill takes less than 2 seconds.      Findings: No rash.   Neurological:      General: No focal deficit present.      Mental Status: She is alert and oriented to person, place, and time.      Cranial Nerves: No cranial nerve deficit.      Motor: No weakness.   Psychiatric:         Mood and Affect: Mood normal.         Behavior: Behavior normal.         Procedures           ED Course  ED Course as of Aug 17 0017   Mon Aug 16, 2021   2257 The patient was administered IV normal saline at 250 cc/h.    [TP]   2812 Review the patient's laboratory studies: Her CBC has a normal white count of 10.23 with a left shift.  Hemoglobin, hematocrit and platelets within normal limits.  CMP has a mildly elevated glucose of 120, otherwise normal electrolytes and normal liver function test.  The GFR was slightly low at 59, but the BUN and creatinine were within normal limits.  Urinalysis was negative for blood or infection.    [TP]   5093 CT scan here not the best quality images but no convincing evidence for bowel obstruction.  Clinically patient does not appear to have a bowel obstruction either.  She has had some nausea but no vomiting and overall appears nontoxic.  Patient does have some minimal tenderness at site of hernia but given the size of her hernia her symptoms do not seem out of proportion from what I would expect her to have.  Patient has had bowel obstructions in the past, does endorse that her symptoms were much more severe at that time.  We discussed expected course, return precautions.  Patient is followed with  both Dr. Logan and Dr. Tang for surgical repair in the past.  Patient is not sure which when she wants to follow-up with going forward.  Advised her to think about that a little more and make a follow-up appointment with either 1 as soon as possible.    [TD]      ED Course User Index  [TD] Luis Calderon MD  [TP] Luis Gonzalez MD                                           [facility]    Final diagnoses:   Ventral hernia without obstruction or gangrene       ED Disposition  ED Disposition     ED Disposition Condition Comment    Discharge Stable           Luis Tang MD  [address] [address]  [phone]    Schedule an appointment as soon as possible for a visit in 1 week           Medication List      No changes were made to your prescriptions during this visit.          Luis Calderon MD  08/17/21 0017

## 2021-08-17 NOTE — ED NOTES
Pt educated to use call light when able to provide a urine specimen.      Giovana Hernandez RN  08/16/21 [id number]

## 2021-09-28 ENCOUNTER — APPOINTMENT (OUTPATIENT)
Dept: SLEEP MEDICINE | Facility: HOSPITAL | Age: 66
End: 2021-09-28

## 2021-10-26 ENCOUNTER — OFFICE VISIT (OUTPATIENT)
Dept: SLEEP MEDICINE | Facility: HOSPITAL | Age: 66
End: 2021-10-26

## 2021-10-26 ENCOUNTER — APPOINTMENT (OUTPATIENT)
Dept: SLEEP MEDICINE | Facility: HOSPITAL | Age: 66
End: 2021-10-26

## 2021-10-26 VITALS
DIASTOLIC BLOOD PRESSURE: 70 MMHG | HEART RATE: 84 BPM | WEIGHT: 293 LBS | SYSTOLIC BLOOD PRESSURE: 131 MMHG | HEIGHT: 62 IN | BODY MASS INDEX: 53.92 KG/M2

## 2021-10-26 DIAGNOSIS — E66.01 OBESITY, MORBID, BMI 40.0-49.9 (HCC): ICD-10-CM

## 2021-10-26 DIAGNOSIS — G47.33 OBSTRUCTIVE SLEEP APNEA: Primary | ICD-10-CM

## 2021-10-26 PROCEDURE — G0463 HOSPITAL OUTPT CLINIC VISIT: HCPCS

## 2021-10-26 NOTE — PROGRESS NOTES
[facility] SLEEP MEDICINE  [address]  3RD FLOOR  [facility] [address]  [phone]    PCP: Yolanda Marshall PA    Reason for visit:  Sleep disorders: KELLY    Susanne is a 66 y.o.female who was seen in the [facility] today. Annual fu. She uses a Starla device but has been unable to register for the recall. She sleeps from 2 am to 10 am. She uses nasal cushion and its comfortable, no air leaks or dry mouth. She wakes up mostly rested.  [place] Sleepiness Scale is 11. Caffeine 4 per day. Alcohol 0 per week.    Susanne  reports that she has never smoked. She has never used smokeless tobacco.    Pertinent Positive Review of Systems of nasal congestion, dry mouth, pnd  Rest of Review of Systems was negative as recorded in Sleep Questionnaire.    Patient  has a past medical history of Colon polyp, Crohn's disease (CMS/HCC), GERD (gastroesophageal reflux disease), Glaucoma, History of acute renal failure, History of transfusion, Hyperlipidemia, Hypertension, Hypothyroidism, Obstructive sleep apnea, Osteoarthritis, Respiratory failure, post-operative (CMS/HCC), Sleep apnea, Ventral hernia, and Vertigo.     Current Medications:    Current Outpatient Medications:   •  azaTHIOprine (IMURAN) 50 MG tablet, TAKE FOUR AND ONE-HALF TABLETS BY MOUTH EVERY DAY, Disp: 405 tablet, Rfl: 5  •  B Complex Vitamins (VITAMIN B COMPLEX PO), Take  by mouth., Disp: , Rfl:   •  Calcium Carbonate-Vitamin D (CALTRATE 600+D PO), Take  by mouth Daily., Disp: , Rfl:   •  Calcium Citrate-Vitamin D (CALCIUM CITRATE +D PO), Take  by mouth Daily., Disp: , Rfl:   •  celecoxib (CeleBREX) 200 MG capsule, Take 200 mg by mouth Daily., Disp: , Rfl:   •  fluticasone (FLONASE) 50 MCG/ACT nasal spray, 2 sprays into the nostril(s) as directed by provider Daily., Disp: , Rfl:   •  latanoprost (XALATAN) 0.005 % ophthalmic solution, , Disp: , Rfl:   •  levocetirizine (XYZAL) 5 MG tablet, Take 5 mg by mouth Every Morning., Disp: , Rfl:  "  •  levothyroxine (SYNTHROID, LEVOTHROID) 50 MCG tablet, Take 50 mcg by mouth Daily., Disp: , Rfl:   •  lisinopril-hydrochlorothiazide (PRINZIDE,ZESTORETIC) 20-12.5 MG per tablet, Take 1 tablet by mouth 2 (Two) Times a Day Before Meals., Disp: , Rfl:   •  methocarbamol (ROBAXIN) 750 MG tablet, Take 750 mg by mouth 2 (two) times a day., Disp: , Rfl:   •  omeprazole (PriLOSEC) 20 MG capsule, Take 20 mg by mouth Daily., Disp: , Rfl:   •  potassium chloride (K-DUR,KLOR-CON) 20 MEQ tablet controlled-release ER tablet, Take 20 mEq by mouth Daily., Disp: , Rfl:   •  pravastatin (PRAVACHOL) 80 MG tablet, , Disp: , Rfl:   •  sertraline (ZOLOFT) 100 MG tablet, Take 100 mg by mouth Every Night., Disp: , Rfl:   •  torsemide (DEMADEX) 20 MG tablet, Take 20 mg by mouth Daily. As needed, Disp: , Rfl:    also entered in Sleep Questionnaire         Vital Signs: /70   Pulse 84   Ht 157.5 cm (62\")   Wt (!) 168 kg (370 lb)   BMI 67.67 kg/m²     Body mass index is 67.67 kg/m².       Tongue: Normal       Dentition: good       Pharynx: Posterior pharyngeal pillars are wide   Mallampatti: III (soft and hard palate and base of uvula visible)        General: Alert. Cooperative. Well developed. No acute distress.             Head:  Normocephalic. Symmetrical. Atraumatic.              Nose: No septal deviation. No drainage.          Throat: No oral lesions. No thrush. Moist mucous membranes.    Chest Wall:  Normal shape. Symmetric expansion with respiration. No tenderness.             Neck:  Trachea midline.           Lungs:  Clear to auscultation bilaterally. No wheezes. No rhonchi. No rales. Respirations regular, even and unlabored.            Heart:  Regular rhythm and normal rate. Normal S1 and S2. No murmur.     Abdomen:  Soft, non-tender and non-distended. Normal bowel sounds. No masses.  Extremities:  Moves all extremities well. No edema.    Psychiatric: Normal mood and affect.  Uses a wheelchair    Diagnostic data available " to date is as below and was reviewed on current visit:  · 5/22/17  Overnight split polysomnogram study that was completed as diagnostic only since sufficient severity of obstructive sleep apnea was not present.  Sleep efficiency was 68.7% with 4.44 hours of total sleep time.  AHI index is 5.2.  Sleep architecture was abnormal with absence of REM sleep.  Due to the absence of REM sleep severity may be underestimated.  Oxygen saturation remained above 89%.  PLM index increased to 69.6 with PLM arousal index of 4.1.  Snoring noted for 40.7% of total sleep time.  · 7/28/18 Nocox on CPAP 11cms: No significant desat    Most current available usage data reviewed on 10/26/2021:  · 100 percent compliance average 8 hours AHI 0.1 set pressure 11 cm    DME Company: [employer]    No orders of the defined types were placed in this encounter.         Prescription to DME for replacement supplies as below:    nasal mask      Description Replacement    Nasal PILLOWS      A 7034 Nasal Pillows  every 3 mth    A 7033 Repl Nasal Pillows  2 per mth    Nasal MASK/CUSHION     x A 7034 Nasal Mask/Cushion  every 3 mth   x A 7032 Repl Nasal Mask/Cushion  2 per mth    Full Face MASK      A 7030 Full Face Mask  every 3 mth    A 7031 Repl Face Mask  1 per mth      A 4604 Heated Tubing  every 3 mth    A 7037 Standard Tubing  every 3 mth   x A 7035 Headgear  every 3 mth   x A 7046 Repl Humidifier Chamber  every 6 yrs   x A 7038 Disposable Filters  2 per mth   x A 7039 Non-disposable Filter  every 6 mth   x A 7036 Chin Strap  every 6 mth           Impression:  1. Obstructive sleep apnea    2. Obesity, morbid, BMI 40.0-49.9 ([facility])        Plan:  Susanne is doing well with the CPAP device. She was advised on how to register for the recall.  She can keep the same pressures.    I have discussed the Starla device recall issue with patient. Advised to use the CPAP with a bacterial filter between machine and hose and without humidifier, until can replace or  repair. Patient was given information regarding registration on Starla website at [email or web address]     I reiterated the importance of effective treatment of obstructive sleep apnea with PAP machine.  Cardiovascular health risks of untreated sleep apnea were again reviewed.  Patient was asked to remain cautious if there is persistent hypersomnolence. The benefit of weight loss in reducing severity of obstructive sleep apnea was discussed.  Patient would benefit from adhering to a strict diet to achieve ideal BMI.     Change of PAP supplies regularly is important for effective use.  Change of cushion on the mask or plugs on nasal pillows along with disposable filters once every month and change of mask frame, tubing, headgear and Velcro straps every 6 months at the minimum was reiterated.    This patient is compliant with PAP machine and benefits from its use.  Apnea hypopneas index is corrected/improved.  Daytime hypersomnolence has resolved.     Patient will follow up in this clinic in 1 year APRN    Thank you for allowing me to participate in your patient's care.    Electronically signed by Hang Roberts MD, 10/26/21, 3:40 PM EDT.    Part of this note may be an electronic transcription/translation of spoken language to printed text using the Dragon Dictation System.

## 2022-02-15 RX ORDER — AZATHIOPRINE 50 MG/1
TABLET ORAL
Qty: 405 TABLET | Refills: 5 | Status: SHIPPED | OUTPATIENT
Start: 2022-02-15 | End: 2023-03-03 | Stop reason: SDUPTHER

## 2022-10-24 ENCOUNTER — PREP FOR SURGERY (OUTPATIENT)
Dept: OTHER | Facility: HOSPITAL | Age: 67
End: 2022-10-24

## 2022-10-24 ENCOUNTER — OFFICE VISIT (OUTPATIENT)
Dept: GASTROENTEROLOGY | Facility: CLINIC | Age: 67
End: 2022-10-24

## 2022-10-24 VITALS
BODY MASS INDEX: 53.92 KG/M2 | WEIGHT: 293 LBS | HEIGHT: 62 IN | DIASTOLIC BLOOD PRESSURE: 78 MMHG | SYSTOLIC BLOOD PRESSURE: 120 MMHG

## 2022-10-24 DIAGNOSIS — K50.00 CROHN'S DISEASE OF SMALL INTESTINE WITHOUT COMPLICATION: Primary | ICD-10-CM

## 2022-10-24 DIAGNOSIS — K50.00 CROHN'S DISEASE OF ILEUM WITHOUT COMPLICATION: Primary | ICD-10-CM

## 2022-10-24 DIAGNOSIS — K21.00 GASTROESOPHAGEAL REFLUX DISEASE WITH ESOPHAGITIS WITHOUT HEMORRHAGE: ICD-10-CM

## 2022-10-24 DIAGNOSIS — C31.9: ICD-10-CM

## 2022-10-24 DIAGNOSIS — K43.9 ABDOMINAL WALL HERNIA: ICD-10-CM

## 2022-10-24 PROCEDURE — 99214 OFFICE O/P EST MOD 30 MIN: CPT | Performed by: INTERNAL MEDICINE

## 2022-10-24 RX ORDER — NICOTINE POLACRILEX 2 MG
5000 GUM BUCCAL
COMMUNITY
Start: 2022-05-25

## 2022-10-24 RX ORDER — HYDROCHLOROTHIAZIDE 25 MG/1
25 TABLET ORAL DAILY
COMMUNITY

## 2022-10-24 RX ORDER — TURMERIC/TURMERIC EXT/PEPR EXT 900-100 MG
CAPSULE ORAL 2 TIMES DAILY
COMMUNITY
Start: 2022-05-25

## 2022-10-24 RX ORDER — CYCLOBENZAPRINE HCL 10 MG
10 TABLET ORAL 2 TIMES DAILY
COMMUNITY
Start: 2022-05-25

## 2022-10-24 RX ORDER — MONTELUKAST SODIUM 10 MG/1
TABLET ORAL
COMMUNITY
Start: 2022-10-23

## 2022-10-24 NOTE — PROGRESS NOTES
PATIENT INFORMATION  Susanne Harper       - 1955    CHIEF COMPLAINT  Chief Complaint   Patient presents with   • Crohn's Disease   • Heartburn       HISTORY OF PRESENT ILLNESS  A Pt of Dr Greenwood was seen in the hospital but has persistent abd wall hernias-did review her lat 2 CT images- poor quality due to her size but no clear evidince of active Crohns ileitis and recent sinus adenocarcinoma    63 yo MOB WF presents with a day of abd pain and vomiting with an abd wall buldge. Her obstructing hernia has been reduced and she has been seen by surgery with plans for elective repair as an OP.   She describes intermittant rectal bleeding for some time but just yesterday and today had some painless rectal bleeding. She says her bowels are normal and 2 a day and her CT ofcourse was unremarkable wrt Crohns. And her HGB is 15 and NC/NC  Her history of crohns treated by Dr [place]  and she feels it was SB only. SHe has never required surgery for her Crohns b ut has of course had Abd wall hernias repaired before. Her most recent colonoscopy was in 2020.     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    Bowels are 1-2 a day adn besides the hernia issues no abd pain.      REVIEWED PERTINENT RESULTS/ LABS  Lab Results   Component Value Date    CASEREPORT  2020     Surgical Pathology Report                         Case: [id number]                                  Authorizing Provider:  Shantal Greenwood MD         Collected:           2020 09:04 AM          Ordering Location:     [facility]   Received:            2020 10:28 AM                                 OR                                                                           Pathologist:           Ashok Garcia MD                                                          Specimens:   1) - Small Intestine, terminal ileum biopsy                                                         2) - Large Intestine, Right / Ascending Colon, right colon biopsy                                   3) - Large Intestine, Transverse Colon, transverse colon  biopsy                                    4) - Large Intestine, Left / Descending Colon, left colon biopsy                                    5) - Large Intestine, Rectum, rectal polyp x5                                                       6) - Large Intestine, Rectum, randum rectal biopsies                                       FINALDX  01/08/2020     1. Terminal Ileum, Biopsy: Benign small bowel mucosa with   A. Normal intact villous surface.   B. No significant inflammation, no granulomas.   C. No viral inclusions or other organisms on routinely stained sections.    2. Colon, Right, Biopsy: Benign colonic mucosa with   A. No hyperplastic or tubulovillous change.   B. No significant inflammation.   C. No crypt distortion or basement membrane thickening.   D. No viral inclusions or other organisms on routinely stained sections.    3. Colon, Transverse, Biopsy: Benign colonic mucosa with   A. No hyperplastic or tubulovillous change.   B. No significant inflammation.   C. No crypt distortion or basement membrane thickening.   D. No viral inclusions or other organisms on routinely stained sections.    4. Colon, Left, Biopsy: Benign colonic mucosa with   A. No hyperplastic or tubulovillous change.   B. No significant inflammation.   C. No crypt distortion or basement membrane thickening.   D. No viral inclusions or other organisms on routinely stained sections.    5. Rectum, Biopsy:    A. Hyperplastic polyps.    6. Rectum, Random, Biopsy: Benign colonic mucosa with   A. A single fragment suggestive of developing hyperplastic polyp.    jalil/pkm           Lab Results   Component Value Date    HGB 13.3 08/16/2021    MCV 98.8 (H) 08/16/2021      08/16/2021    ALT 24 08/16/2021    AST 30 08/16/2021    HGBA1C 5.80 (H) 04/12/2020    INR 1.04 04/13/2020      No results found.    REVIEW OF SYSTEMS  Review of Systems   Constitutional: Negative for activity change, chills, fever and unexpected weight change.   HENT: Negative for congestion.    Eyes: Negative for visual disturbance.   Respiratory: Negative for shortness of breath.    Cardiovascular: Negative for chest pain and palpitations.   Gastrointestinal: Negative for abdominal pain and blood in stool.   Endocrine: Negative for cold intolerance and heat intolerance.   Genitourinary: Negative for hematuria.   Musculoskeletal: Negative for gait problem.   Skin: Negative for color change.   Allergic/Immunologic: Negative for immunocompromised state.   Neurological: Negative for weakness and light-headedness.   Hematological: Negative for adenopathy.   Psychiatric/Behavioral: Negative for sleep disturbance. The patient is not nervous/anxious.          ACTIVE PROBLEMS  Patient Active Problem List    Diagnosis    • Abdominal wall hernia [K43.9]    • Hyperlipidemia [E78.5]    • Crohn's disease without complication (HCC) [K50.90]    • Incisional hernia, without obstruction or gangrene [K43.2]    • Gastroesophageal reflux disease [K21.9]    • Hypertension [I10]    • Irreducible umbilical hernia [K42.0]          PAST MEDICAL HISTORY  Past Medical History:   Diagnosis Date   • Colon polyp    • Crohn's disease (HCC)    • GERD (gastroesophageal reflux disease)    • Glaucoma    • History of acute renal failure     after surgery   • History of transfusion    • Hyperlipidemia    • Hypertension    • Hypothyroidism    • Obstructive sleep apnea    • Osteoarthritis    • Respiratory failure, post-operative (HCC)     on vent x3 days 12/2015   • Sleep apnea     w/cpap   • Ventral hernia    • Vertigo          SURGICAL HISTORY  Past Surgical History:   Procedure Laterality Date   • COLONOSCOPY     • COLONOSCOPY N/A 1/8/2020     Procedure: COLONOSCOPY , polypectomy with biopsies;  Surgeon: Shantal Greenwood MD;  Location: [facility] OR;  Service: Gastroenterology   • GANGLION CYST EXCISION     • HYSTERECTOMY     • OMENTECTOMY     • TONSILLECTOMY     • UMBILICAL HERNIA REPAIR  12/2015   • VENTRAL/INCISIONAL HERNIA REPAIR N/A 11/30/2016    Procedure: VENTRAL/INCISIONAL HERNIA REPAIR;  Surgeon: Sumit Logan MD;  Location: [facility] OR;  Service:          FAMILY HISTORY  Family History   Problem Relation Age of Onset   • Arthritis Mother    • Diabetes Maternal Grandmother    • Glaucoma Maternal Grandmother    • Heart disease Maternal Grandfather    • Cancer Maternal Grandfather    • Heart disease Paternal Grandfather    • Congenital heart disease Sister    • Breast cancer Neg Hx    • Colon cancer Neg Hx    • Colon polyps Neg Hx          SOCIAL HISTORY  Social History     Occupational History   • Not on file   Tobacco Use   • Smoking status: Never   • Smokeless tobacco: Never   • Tobacco comments:     N/A   Vaping Use   • Vaping Use: Never used   Substance and Sexual Activity   • Alcohol use: Never   • Drug use: No   • Sexual activity: Defer         CURRENT MEDICATIONS    Current Outpatient Medications:   •  azaTHIOprine (IMURAN) 50 MG tablet, TAKE FOUR AND ONE-HALF TABLETS BY MOUTH ONCE DAILY, Disp: 405 tablet, Rfl: 5  •  B Complex Vitamins (VITAMIN B COMPLEX PO), Take  by mouth., Disp: , Rfl:   •  Biotin 1 MG capsule, 5,000 mcg., Disp: , Rfl:   •  Black Pepper-Turmeric (Turmeric Curcumin) 5-1000 MG capsule, 2 (Two) Times a Day., Disp: , Rfl:   •  Calcium Carbonate-Vitamin D (CALTRATE 600+D PO), Take  by mouth Daily., Disp: , Rfl:   •  Calcium Citrate-Vitamin D (CALCIUM CITRATE +D PO), Take  by mouth Daily., Disp: , Rfl:   •  celecoxib (CeleBREX) 200 MG capsule, Take 200 mg by mouth Daily., Disp: , Rfl:   •  cyclobenzaprine (FLEXERIL) 10 MG tablet, 1 tablet 2 (Two) Times a Day., Disp: , Rfl:   •  fluticasone (FLONASE) 50 MCG/ACT nasal spray, 2  "sprays into the nostril(s) as directed by provider Daily., Disp: , Rfl:   •  hydroCHLOROthiazide (HYDRODIURIL) 25 MG tablet, Take 1 tablet by mouth Daily., Disp: , Rfl:   •  latanoprost (XALATAN) 0.005 % ophthalmic solution, , Disp: , Rfl:   •  levocetirizine (XYZAL) 5 MG tablet, Take 5 mg by mouth Every Morning., Disp: , Rfl:   •  levothyroxine (SYNTHROID, LEVOTHROID) 50 MCG tablet, Take 50 mcg by mouth Daily., Disp: , Rfl:   •  lisinopril-hydrochlorothiazide (PRINZIDE,ZESTORETIC) 20-12.5 MG per tablet, Take 1 tablet by mouth 2 (Two) Times a Day Before Meals., Disp: , Rfl:   •  montelukast (SINGULAIR) 10 MG tablet, , Disp: , Rfl:   •  omeprazole (PriLOSEC) 20 MG capsule, Take 20 mg by mouth Daily., Disp: , Rfl:   •  potassium chloride (K-DUR,KLOR-CON) 20 MEQ tablet controlled-release ER tablet, Take 20 mEq by mouth Daily., Disp: , Rfl:   •  pravastatin (PRAVACHOL) 80 MG tablet, Take 1 tablet by mouth Daily., Disp: , Rfl:   •  sertraline (ZOLOFT) 100 MG tablet, Take 100 mg by mouth Every Night., Disp: , Rfl:     ALLERGIES  Codeine and Penicillins    VITALS  Vitals:    10/24/22 1301   BP: 120/78   BP Location: Left arm   Patient Position: Sitting   Cuff Size: Large Adult   Weight: (!) 181 kg (400 lb)   Height: 157.5 cm (62\")       PHYSICAL EXAM  Debilities/Disabilities Identified: None  Emotional Behavior: Appropriate  Wt Readings from Last 3 Encounters:   10/24/22 (!) 181 kg (400 lb)   10/26/21 (!) 168 kg (370 lb)   08/16/21 (!) 188 kg (415 lb)     Ht Readings from Last 1 Encounters:   10/24/22 157.5 cm (62\")     Body mass index is 73.16 kg/m².  Physical Exam  Constitutional:       Appearance: She is well-developed. She is obese. She is not diaphoretic.      Comments: In a WC due to ambulation issues   Eyes:      General: No scleral icterus.     Conjunctiva/sclera: Conjunctivae normal.      Pupils: Pupils are equal, round, and reactive to light.   Neck:      Thyroid: No thyromegaly.   Cardiovascular:      Rate and " Rhythm: Normal rate and regular rhythm.      Heart sounds: Normal heart sounds. No murmur heard.    No gallop.   Pulmonary:      Effort: Pulmonary effort is normal.      Breath sounds: Normal breath sounds. No wheezing or rales.   Abdominal:      General: Bowel sounds are normal. There is no distension or abdominal bruit.      Palpations: Abdomen is soft. There is no shifting dullness, fluid wave or mass.      Tenderness: There is no abdominal tenderness. There is no guarding. Negative signs include Pardo's sign.      Hernia: A hernia is present. There is no hernia in the ventral area.   Musculoskeletal:         General: Normal range of motion.      Cervical back: Normal range of motion and neck supple.   Lymphadenopathy:      Cervical: No cervical adenopathy.   Skin:     General: Skin is warm and dry.      Findings: No erythema or rash.   Neurological:      Mental Status: She is alert and oriented to person, place, and time.         CLINICAL DATA REVIEWED   reviewed previous lab results and integrated with today's visit, reviewed notes from other physicians and/or last GI encounter, reviewed previous endoscopy results and available photos, reviewed surgical pathology results from previous biopsies    ASSESSMENT  Diagnoses and all orders for this visit:    Crohn's disease of small intestine without complication (HCC)  -     Case Request; Standing  -     Case Request    Gastroesophageal reflux disease with esophagitis without hemorrhage    Abdominal wall hernia    Primary adenocarcinoma of paranasal sinus (HCC)    Other orders  -     Biotin 1 MG capsule; 5,000 mcg.  -     cyclobenzaprine (FLEXERIL) 10 MG tablet; 1 tablet 2 (Two) Times a Day.  -     Black Pepper-Turmeric (Turmeric Curcumin) 5-1000 MG capsule; 2 (Two) Times a Day.  -     montelukast (SINGULAIR) 10 MG tablet  -     hydroCHLOROthiazide (HYDRODIURIL) 25 MG tablet; Take 1 tablet by mouth Daily.  -     Follow Anesthesia Guidelines / Protocol; Future  -      Obtain informed consent; Standing  -     Verify bowel prep was successful; Standing  -     Give tap water enema if bowel prep was insufficient; Standing          PLAN  Her 3 year exam is due in 1/2023 so have her schedule that today    Return in about 6 months (around 4/24/2023).    I have discussed the above plan with the patient.  They verbalize understanding and are in agreement with the plan.  They have been advised to contact the office for any questions, concerns, or changes related to their health.

## 2022-10-25 PROBLEM — K50.00 CROHN'S DISEASE OF ILEUM WITHOUT COMPLICATION (HCC): Status: ACTIVE | Noted: 2022-10-25

## 2022-10-26 ENCOUNTER — OFFICE VISIT (OUTPATIENT)
Dept: SLEEP MEDICINE | Facility: HOSPITAL | Age: 67
End: 2022-10-26

## 2022-10-26 VITALS
DIASTOLIC BLOOD PRESSURE: 64 MMHG | HEART RATE: 75 BPM | HEIGHT: 62 IN | WEIGHT: 293 LBS | BODY MASS INDEX: 53.92 KG/M2 | SYSTOLIC BLOOD PRESSURE: 130 MMHG

## 2022-10-26 DIAGNOSIS — E66.01 OBESITY, MORBID, BMI 40.0-49.9: ICD-10-CM

## 2022-10-26 DIAGNOSIS — G47.33 OBSTRUCTIVE SLEEP APNEA: Primary | ICD-10-CM

## 2022-10-26 PROCEDURE — G0463 HOSPITAL OUTPT CLINIC VISIT: HCPCS

## 2022-10-26 NOTE — PROGRESS NOTES
[facility]  Telephone: [phone] / Fax: [phone] [place]  Telephone: [phone] / Fax: [phone] Lizabeth Wright    PCP: Yolanda Marshall PA    Reason for visit: KELLY f/u    Susanne Harper is a 67 y.o.female  was last seen at [facility] sleep lab in Oct 2021. She finally got replacement DreamStation 2 machine from [employer]. It is set at 11cm H2O fixed pressure and pressure is very comfortable. Machine's humidifier is not working.  When she turns the machine on, machine is asking her to call support center to find out what is the issue with current humidifier. She has received the replacement unit earlier this year. Machine is able to control the snoring and apneas.  There is no complaint of aerophagia, excessive dry mouth or air leak.    SH- no tobacco, no alcohol, 2 caffeine per day    ROS +GERD, rest is negative    DME-[employer]     Current Medications:    Current Outpatient Medications:   •  azaTHIOprine (IMURAN) 50 MG tablet, TAKE FOUR AND ONE-HALF TABLETS BY MOUTH ONCE DAILY, Disp: 405 tablet, Rfl: 5  •  B Complex Vitamins (VITAMIN B COMPLEX PO), Take  by mouth., Disp: , Rfl:   •  Biotin 1 MG capsule, 5,000 mcg., Disp: , Rfl:   •  Black Pepper-Turmeric (Turmeric Curcumin) 5-1000 MG capsule, 2 (Two) Times a Day., Disp: , Rfl:   •  Calcium Carbonate-Vitamin D (CALTRATE 600+D PO), Take  by mouth Daily., Disp: , Rfl:   •  Calcium Citrate-Vitamin D (CALCIUM CITRATE +D PO), Take  by mouth Daily., Disp: , Rfl:   •  celecoxib (CeleBREX) 200 MG capsule, Take 200 mg by mouth Daily., Disp: , Rfl:   •  cyclobenzaprine (FLEXERIL) 10 MG tablet, 1 tablet 2 (Two) Times a Day., Disp: , Rfl:   •  fluticasone (FLONASE) 50 MCG/ACT nasal spray, 2 sprays into the nostril(s) as directed by provider Daily., Disp: , Rfl:   •  hydroCHLOROthiazide (HYDRODIURIL) 25 MG tablet, Take 1 tablet by mouth Daily., Disp: , Rfl:   •  latanoprost (XALATAN) 0.005 % ophthalmic solution, , Disp: , Rfl:   •  levocetirizine  "(XYZAL) 5 MG tablet, Take 5 mg by mouth Every Morning., Disp: , Rfl:   •  levothyroxine (SYNTHROID, LEVOTHROID) 50 MCG tablet, Take 50 mcg by mouth Daily., Disp: , Rfl:   •  lisinopril-hydrochlorothiazide (PRINZIDE,ZESTORETIC) 20-12.5 MG per tablet, Take 1 tablet by mouth 2 (Two) Times a Day Before Meals., Disp: , Rfl:   •  montelukast (SINGULAIR) 10 MG tablet, , Disp: , Rfl:   •  omeprazole (PriLOSEC) 20 MG capsule, Take 20 mg by mouth Daily., Disp: , Rfl:   •  potassium chloride (K-DUR,KLOR-CON) 20 MEQ tablet controlled-release ER tablet, Take 20 mEq by mouth Daily., Disp: , Rfl:   •  pravastatin (PRAVACHOL) 80 MG tablet, Take 1 tablet by mouth Daily., Disp: , Rfl:   •  sertraline (ZOLOFT) 100 MG tablet, Take 100 mg by mouth Every Night., Disp: , Rfl:    also entered in Sleep Questionnaire    Patient  has a past medical history of Colon polyp, Crohn's disease (HCC), GERD (gastroesophageal reflux disease), Glaucoma, History of acute renal failure, History of transfusion, Hyperlipidemia, Hypertension, Hypothyroidism, Obstructive sleep apnea, Osteoarthritis, Respiratory failure, post-operative (HCC), Sleep apnea, Ventral hernia, and Vertigo.    I have reviewed the Past Medical History, Past Surgical History, Social History and Family History.    Vital Signs /64   Pulse 75   Ht 157.5 cm (62\")   Wt (!) 181 kg (400 lb)   BMI 73.16 kg/m²  Body mass index is 73.16 kg/m².    General Alert and oriented. No acute distress noted   Pharynx/Throat Class IV  Mallampati airway, large tongue, no evidence of redundant lateral pharyngeal tissue. No oral lesions. No thrush. Moist mucous membranes.   Head Normocephalic. Symmetrical. Atraumatic.    Nose No septal deviation. No drainage   Chest Wall Normal shape. Symmetric expansion with respiration. No tenderness.   Neck Trachea midline, no thyromegaly or adenopathy    Lungs Clear to auscultation bilaterally. No wheezes. No rhonchi. No rales. Respirations regular, even and " unlabored.   Heart Regular rhythm and normal rate. Normal S1 and S2. No murmur   Abdomen Soft, non-tender and non-distended. Normal bowel sounds. No masses.   Extremities Moves all extremities well. No edema   Psychiatric Normal mood and affect.     Testing:  Download 7/27/22- 10/24/22 100% use with average nightly use of 7 hours and 20 min on CPAP 11cm H2O, AHI 0.3/hr, leak of 0 seconds.    Study-5/22/17-Results:  Overnight split polysomnogram study that was completed as diagnostic only since sufficient severity of obstructive sleep apnea was not present.  Sleep efficiency was 68.7% with 4.44 hours of total sleep time.  AHI index is 5.2.  Sleep architecture was abnormal with absence of REM sleep.  Due to the absence of REM sleep severity may be underestimated.  Oxygen saturation remained above 89%.  PLM index increased to 69.6 with PLM arousal index of 4.1.  Snoring noted for 40.7% of total sleep time.    Impression:  1. Obstructive sleep apnea    2. Obesity, morbid, BMI 40.0-49.9 ([facility])          Plan:  Order new machine from [employer] CPAP 10-12cm H2O, preferably ResMed unit, if humidifier on current machine cannot be repaired. I reviewed original sleep study and recent download report with patient. She uses the machine and benefits from its use. I asked pt to see Dr Roberts in 1 year if she gets the humidifier issue resolved. If she ends up getting a new machine through insurance, she will need to f/u here in 3 months.    Weight loss will be strongly beneficial to reduce the severity of sleep-disordered breathing.  Caution during activities that require prolonged concentration is strongly advised if sleepiness returns. Changing of PAP supplies regularly is important for effective use.    Follow up with Dr. Roberts in one year    Thank you for allowing me to participate in your patient's care.      NAZIA Jett  [facility]  Phone: [phone]      Part of this note may be an electronic  transcription/translation of spoken language to printed text using the Dragon Dictation System.

## 2022-11-07 ENCOUNTER — TELEPHONE (OUTPATIENT)
Dept: SLEEP MEDICINE | Facility: HOSPITAL | Age: 67
End: 2022-11-07

## 2023-03-03 DIAGNOSIS — K50.00 CROHN'S DISEASE OF ILEUM WITHOUT COMPLICATION: Primary | ICD-10-CM

## 2023-03-03 RX ORDER — AZATHIOPRINE 50 MG/1
TABLET ORAL
Qty: 405 TABLET | Refills: 0 | Status: SHIPPED | OUTPATIENT
Start: 2023-03-03

## 2023-03-28 ENCOUNTER — ANESTHESIA EVENT (OUTPATIENT)
Dept: PERIOP | Facility: HOSPITAL | Age: 68
End: 2023-03-28
Payer: MEDICARE

## 2023-03-28 RX ORDER — UREA 10 %
27 LOTION (ML) TOPICAL 2 TIMES DAILY
COMMUNITY

## 2023-03-28 RX ORDER — MULTIVIT WITH MINERALS/LUTEIN
250 TABLET ORAL DAILY
COMMUNITY

## 2023-03-29 ENCOUNTER — HOSPITAL ENCOUNTER (OUTPATIENT)
Dept: GENERAL RADIOLOGY | Facility: HOSPITAL | Age: 68
Setting detail: HOSPITAL OUTPATIENT SURGERY
Discharge: HOME | End: 2023-03-29
Payer: MEDICARE

## 2023-03-29 ENCOUNTER — ANESTHESIA (OUTPATIENT)
Dept: PERIOP | Facility: HOSPITAL | Age: 68
End: 2023-03-29
Payer: MEDICARE

## 2023-03-29 ENCOUNTER — HOSPITAL ENCOUNTER (OUTPATIENT)
Facility: HOSPITAL | Age: 68
Setting detail: HOSPITAL OUTPATIENT SURGERY
Discharge: HOME | End: 2023-03-29
Attending: INTERNAL MEDICINE | Admitting: INTERNAL MEDICINE
Payer: MEDICARE

## 2023-03-29 VITALS
WEIGHT: 293 LBS | DIASTOLIC BLOOD PRESSURE: 81 MMHG | SYSTOLIC BLOOD PRESSURE: 154 MMHG | OXYGEN SATURATION: 93 % | HEIGHT: 62 IN | HEART RATE: 73 BPM | TEMPERATURE: 98 F | RESPIRATION RATE: 20 BRPM | BODY MASS INDEX: 53.92 KG/M2

## 2023-03-29 DIAGNOSIS — K50.00 CROHN'S DISEASE OF ILEUM WITHOUT COMPLICATION: ICD-10-CM

## 2023-03-29 DIAGNOSIS — K50.00 CROHN'S DISEASE OF ILEUM WITHOUT COMPLICATION: Primary | ICD-10-CM

## 2023-03-29 PROCEDURE — 45378 DIAGNOSTIC COLONOSCOPY: CPT | Performed by: INTERNAL MEDICINE

## 2023-03-29 PROCEDURE — 74280 X-RAY XM COLON 2CNTRST STD: CPT

## 2023-03-29 PROCEDURE — 25010000002 PROPOFOL 10 MG/ML EMULSION: Performed by: ANESTHESIOLOGY

## 2023-03-29 RX ORDER — SODIUM CHLORIDE 0.9 % (FLUSH) 0.9 %
10 SYRINGE (ML) INJECTION EVERY 12 HOURS SCHEDULED
Status: DISCONTINUED | OUTPATIENT
Start: 2023-03-29 | End: 2023-03-29 | Stop reason: HOSPADM

## 2023-03-29 RX ORDER — LIDOCAINE HYDROCHLORIDE 20 MG/ML
INJECTION, SOLUTION INFILTRATION; PERINEURAL AS NEEDED
Status: DISCONTINUED | OUTPATIENT
Start: 2023-03-29 | End: 2023-03-29 | Stop reason: SURG

## 2023-03-29 RX ORDER — SODIUM CHLORIDE 0.9 % (FLUSH) 0.9 %
10 SYRINGE (ML) INJECTION AS NEEDED
Status: DISCONTINUED | OUTPATIENT
Start: 2023-03-29 | End: 2023-03-29 | Stop reason: HOSPADM

## 2023-03-29 RX ORDER — SODIUM CHLORIDE, SODIUM LACTATE, POTASSIUM CHLORIDE, CALCIUM CHLORIDE 600; 310; 30; 20 MG/100ML; MG/100ML; MG/100ML; MG/100ML
9 INJECTION, SOLUTION INTRAVENOUS CONTINUOUS PRN
Status: DISCONTINUED | OUTPATIENT
Start: 2023-03-29 | End: 2023-03-29 | Stop reason: HOSPADM

## 2023-03-29 RX ORDER — MAGNESIUM HYDROXIDE 1200 MG/15ML
LIQUID ORAL AS NEEDED
Status: DISCONTINUED | OUTPATIENT
Start: 2023-03-29 | End: 2023-03-29 | Stop reason: HOSPADM

## 2023-03-29 RX ORDER — DEXMEDETOMIDINE HYDROCHLORIDE 100 UG/ML
INJECTION, SOLUTION INTRAVENOUS AS NEEDED
Status: DISCONTINUED | OUTPATIENT
Start: 2023-03-29 | End: 2023-03-29 | Stop reason: SURG

## 2023-03-29 RX ORDER — SODIUM CHLORIDE 9 MG/ML
40 INJECTION, SOLUTION INTRAVENOUS AS NEEDED
Status: DISCONTINUED | OUTPATIENT
Start: 2023-03-29 | End: 2023-03-29 | Stop reason: HOSPADM

## 2023-03-29 RX ORDER — PROPOFOL 10 MG/ML
VIAL (ML) INTRAVENOUS AS NEEDED
Status: DISCONTINUED | OUTPATIENT
Start: 2023-03-29 | End: 2023-03-29 | Stop reason: SURG

## 2023-03-29 RX ORDER — LIDOCAINE HYDROCHLORIDE 10 MG/ML
0.5 INJECTION, SOLUTION EPIDURAL; INFILTRATION; INTRACAUDAL; PERINEURAL ONCE AS NEEDED
Status: DISCONTINUED | OUTPATIENT
Start: 2023-03-29 | End: 2023-03-29 | Stop reason: HOSPADM

## 2023-03-29 RX ADMIN — LIDOCAINE HYDROCHLORIDE 100 MG: 20 INJECTION, SOLUTION INFILTRATION; PERINEURAL at 09:56

## 2023-03-29 RX ADMIN — DEXMEDETOMIDINE 12 MCG: 100 INJECTION, SOLUTION, CONCENTRATE INTRAVENOUS at 09:54

## 2023-03-29 RX ADMIN — SODIUM CHLORIDE, POTASSIUM CHLORIDE, SODIUM LACTATE AND CALCIUM CHLORIDE 9 ML/HR: 600; 310; 30; 20 INJECTION, SOLUTION INTRAVENOUS at 09:37

## 2023-03-29 RX ADMIN — PROPOFOL 500 MG: 10 INJECTION, EMULSION INTRAVENOUS at 09:56

## 2023-03-29 RX ADMIN — BARIUM SULFATE 1900 ML: 1.05 SUSPENSION ORAL; RECTAL at 16:04

## 2023-03-29 NOTE — ANESTHESIA PREPROCEDURE EVALUATION
Anesthesia Evaluation     Patient summary reviewed and Nursing notes reviewed   no history of anesthetic complications:  NPO Solid Status: > 8 hours  NPO Liquid Status: > 8 hours           Airway   Mallampati: II  TM distance: >3 FB  Neck ROM: full  Possible difficult intubation and Large neck circumference  Dental          Pulmonary    (+) shortness of breath ( Seasonal allergies. On singulair-used 3/28/23), sleep apnea on CPAP, decreased breath sounds,   Cardiovascular   Exercise tolerance: poor (<4 METS)    ECG reviewed  Rhythm: regular  Rate: normal    (+) hypertension well controlled less than 2 medications, valvular problems/murmurs murmur, dysrhythmias (pt states she has palpitations ), KERR, murmur, hyperlipidemia,     ROS comment: ECG 12 Lead   Date/Time: 7/20/2021 11:47 AM   Performed by: Ketan Choi MD   Authorized by: Ketan Choi MD   Comparison: compared with previous ECG   Similar to previous ECG   Rhythm: sinus rhythm   Conduction: conduction normal   ST Segments: ST segments normal   T Waves: T waves normal   QRS axis: normal   Other: no other findings   Clinical impression: normal ECG     Interpretation Summary  The left ventricular cavity is mildly dilated.  Left ventricular ejection fraction appears to be 61 - 65%.  Left ventricular diastolic function is consistent with (grade I) impaired relaxation.  Normal right ventricular cavity size and systolic function noted.  The left atrial cavity is mildly dilated.  The aortic valve leaflets are not well visualized, but are at least moderately calcified (aortic sclerosis)  Mild mitral valve regurgitation is present.  Mild tricuspid valve regurgitation is present.  Calculated right ventricular systolic pressure from tricuspid regurgitation is 44 mmHg.  ? There is no evidence of pericardial effusion          Neuro/Psych  (+) headaches (hx of ), dizziness/light headedness ( Vertigo with laying on left side), weakness (generalized, uses walking aides  and wheelchair ), psychiatric history ( pt denies- states zoloft is for sleep) Depression,    GI/Hepatic/Renal/Endo    (+) obesity, morbid obesity, GERD well controlled,  renal disease (post operative renal failure. resolved), thyroid problem hypothyroidism    ROS Comment: Crohn's disease (CMS/HCC)    Musculoskeletal     (+) back pain, chronic pain,       ROS comment: bertram shoulder pain R>L  Abdominal   (+) obese,     Abdomen: soft.   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    Use of blood products discussed with patient  Consented to blood products.   Plan discussed with CRNA.

## 2023-03-29 NOTE — H&P
Patient Care Team:  Yolanda Marshall PA as PCP - General (Physician Assistant)    CHIEF COMPLAINT: Crohn's Ileitis    HISTORY OF PRESENT ILLNESS:  Last exam was > 3 years    Past Medical History:   Diagnosis Date   • Closed patellar sleeve fracture of  right knee     healed spontaneously     Atraumatic    no fall involved   • Colon polyp    • Crohn's disease (HCC)    • GERD (gastroesophageal reflux disease)    • Glaucoma    • History of acute renal failure     after surgery   • History of transfusion    • Hyperlipidemia    • Hypertension    • Hypothyroidism    • Obstructive sleep apnea    • Osteoarthritis    • Respiratory failure, post-operative (HCC)     on vent x3 days 12/2015   • Sleep apnea     w/cpap   • Ventral hernia    • Vertigo      Past Surgical History:   Procedure Laterality Date   • COLONOSCOPY     • COLONOSCOPY N/A 1/8/2020    Procedure: COLONOSCOPY , polypectomy with biopsies;  Surgeon: Shantal Greenwood MD;  Location: [facility];  Service: Gastroenterology   • GANGLION CYST EXCISION     • HYSTERECTOMY     • OMENTECTOMY     • TONSILLECTOMY     • UMBILICAL HERNIA REPAIR  12/2015   • VENTRAL/INCISIONAL HERNIA REPAIR N/A 11/30/2016    Procedure: VENTRAL/INCISIONAL HERNIA REPAIR;  Surgeon: Sumit Logan MD;  Location: [facility];  Service:      Family History   Problem Relation Age of Onset   • Arthritis Mother    • Diabetes Maternal Grandmother    • Glaucoma Maternal Grandmother    • Heart disease Maternal Grandfather    • Cancer Maternal Grandfather    • Heart disease Paternal Grandfather    • Congenital heart disease Sister    • Breast cancer Neg Hx    • Colon cancer Neg Hx    • Colon polyps Neg Hx      Social History     Tobacco Use   • Smoking status: Never   • Smokeless tobacco: Never   • Tobacco comments:     N/A   Vaping Use   • Vaping Use: Never used   Substance Use Topics   • Alcohol use: Never   • Drug use: No     Medications Prior to Admission   Medication Sig Dispense Refill Last Dose    • azaTHIOprine (IMURAN) 50 MG tablet TAKE FOUR AND ONE-HALF TABLETS BY MOUTH ONCE DAILY 405 tablet 0 3/27/2023   • B Complex Vitamins (VITAMIN B COMPLEX PO) Take  by mouth.   3/27/2023   • Biotin 1 MG capsule 5,000 mcg.   3/27/2023   • Black Pepper-Turmeric (Turmeric Curcumin) 5-1000 MG capsule 2 (Two) Times a Day.   3/27/2023   • Calcium Carbonate-Vitamin D (CALTRATE 600+D PO) Take  by mouth Daily.   3/27/2023   • cyclobenzaprine (FLEXERIL) 10 MG tablet 1 tablet 2 (Two) Times a Day.   3/27/2023   • fluticasone (FLONASE) 50 MCG/ACT nasal spray 2 sprays into the nostril(s) as directed by provider Daily.   3/27/2023   • hydroCHLOROthiazide (HYDRODIURIL) 25 MG tablet Take 1 tablet by mouth Daily.   3/27/2023   • latanoprost (XALATAN) 0.005 % ophthalmic solution    3/27/2023   • levocetirizine (XYZAL) 5 MG tablet Take 1 tablet by mouth Every Morning.   3/27/2023   • levothyroxine (SYNTHROID, LEVOTHROID) 50 MCG tablet Take 1 tablet by mouth Daily.   3/29/2023   • lisinopril-hydrochlorothiazide (PRINZIDE,ZESTORETIC) 20-12.5 MG per tablet Take 1 tablet by mouth 2 (Two) Times a Day Before Meals.   3/27/2023   • magnesium, as, gluconate (MAGONATE) 500 (27 Mg) MG tablet Take 1 tablet by mouth 2 (Two) Times a Day.   3/27/2023   • montelukast (SINGULAIR) 10 MG tablet    3/27/2023   • omeprazole (PriLOSEC) 20 MG capsule Take 1 capsule by mouth Daily.   3/27/2023   • potassium chloride (K-DUR,KLOR-CON) 20 MEQ tablet controlled-release ER tablet Take 1 tablet by mouth Daily.   3/27/2023   • pravastatin (PRAVACHOL) 80 MG tablet Take 1 tablet by mouth Daily.   3/27/2023   • sertraline (ZOLOFT) 100 MG tablet Take 1 tablet by mouth Every Night.   3/27/2023   • vitamin C (ASCORBIC ACID) 250 MG tablet Take 1 tablet by mouth Daily.   3/27/2023   • celecoxib (CeleBREX) 200 MG capsule Take 1 capsule by mouth Daily.   3/24/2023     Allergies:  Codeine and Penicillins    REVIEW OF SYSTEMS:  Please see the above history of present illness  "for pertinent positives and negatives.  The remainder of the patient's systems have been reviewed and are negative.     Vital Signs  Temp:  [97.9 °F (36.6 °C)] 97.9 °F (36.6 °C)  Heart Rate:  [73] 73  Resp:  [20] 20  BP: (123)/(83) 123/83    Flowsheet Rows    Flowsheet Row First Filed Value   Admission Height 157.5 cm (62\") Documented at 03/29/2023 0902   Admission Weight 188 kg (415 lb) Documented at 03/29/2023 0902           Physical Exam:  Physical Exam   Constitutional: Patient appears well-developed and well-nourished and in no acute distress   HEENT:   Head: Normocephalic and atraumatic.   Eyes:  Pupils are equal, round, and reactive to light. EOM are intact. Sclerae are anicteric and non-injected.  Mouth and Throat: Patient has moist mucous membranes. Oropharynx is clear of any erythema or exudate.     Neck: Neck supple. No JVD present. No thyromegaly present. No lymphadenopathy present.  Cardiovascular: Regular rate, regular rhythm, S1 normal and S2 normal.  Exam reveals no gallop and no friction rub.  No murmur heard.  Pulmonary/Chest: Lungs are clear to auscultation bilaterally. No respiratory distress. No wheezes. No rhonchi. No rales.   Abdominal: Soft. Bowel sounds are normal. No distension and no mass. There is no hepatosplenomegaly. There is no tenderness.   Musculoskeletal: Normal Muscle tone  Extremities: No edema. Pulses are palpable in all 4 extremities.  Neurological: Patient is alert and oriented to person, place, and time. Cranial nerves II-XII are grossly intact with no focal deficits.  Skin: Skin is warm. No rash noted. Nails show no clubbing.  No cyanosis or erythema.    Debilities/Disabilities Identified: None  Emotional Behavior: Appropriate     Results Review:   I reviewed the patient's new clinical results.    Lab Results (most recent)     None          Imaging Results (Most Recent)     None        reviewed    ECG/EMG Results (most recent)     None        reviewed    Assessment & Plan "   Crohn's Ileitis/  colonoscopy      I discussed the patient's findings and my recommendations with patient.     Christiano Cazares MD  03/29/23  09:51 EDT    Time: 10 min prior to procedure.

## 2023-03-29 NOTE — BRIEF OP NOTE
COLONOSCOPY  Progress Note    Susanne Harper  3/29/2023    Pre-op Diagnosis:   Crohn's disease of ileum without complication (HCC) [K50.00]       Post-Op Diagnosis Codes:     * Crohn's disease of ileum without complication (HCC) [K50.00]     * Peritoneal adhesions [K66.0]    Procedure/CPT® Codes:        Procedure(s):  COLONOSCOPY        Surgeon(s):  Christiano Cazares MD [place], Stephen Kaster, MD    Anesthesia: Monitored Anesthesia Care    Staff:   Circulator: Ludy Hart RN  Scrub Person: Rocio Cortes; Marilyn Casas         Estimated Blood Loss: none    Urine Voided: * No values recorded between 3/29/2023  9:48 AM and 3/29/2023 10:26 AM *    Specimens:                None          Drains: * No LDAs found *    Findings: Incomplete colon to Transverse Colon due to looping/Adhesions  Normol Mucosa        Complications: Incomplete exam/None          Christiano Cazares MD     Date: 3/29/2023  Time: 10:28 EDT

## 2023-03-29 NOTE — ANESTHESIA POSTPROCEDURE EVALUATION
Patient: Susanne Harper    Procedure Summary     Date: 03/29/23 Room / Location: [facility] ENDOSCOPY 1 /  [facility]    Anesthesia Start: 0948 Anesthesia Stop: 1029    Procedure: COLONOSCOPY Diagnosis:       Crohn's disease of ileum without complication (HCC)      Peritoneal adhesions      (Crohn's disease of ileum without complication (HCC) [K50.00])    Surgeons: Christiano Cazares MD Provider: Betsy Olson MD    Anesthesia Type: MAC ASA Status: 3          Anesthesia Type: MAC    Vitals  Vitals Value Taken Time   BP     Temp 98 °F (36.7 °C) 03/29/23 1030   Pulse     Resp     SpO2             Post Anesthesia Care and Evaluation    Patient location during evaluation: PHASE II  Patient participation: complete - patient participated  Level of consciousness: awake  Pain score: 0  Pain management: adequate    Airway patency: patent  Anesthetic complications: No anesthetic complications  PONV Status: none  Cardiovascular status: acceptable  Respiratory status: acceptable  Hydration status: acceptable

## 2023-06-01 RX ORDER — AZATHIOPRINE 50 MG/1
TABLET ORAL
Qty: 405 TABLET | Refills: 0 | OUTPATIENT
Start: 2023-06-01

## 2023-06-01 NOTE — TELEPHONE ENCOUNTER
Patient Past due for 6 month follow up  Once appt is scheduled will be able to refill medication until appt

## 2023-08-22 PROBLEM — M19.90 ARTHRITIS: Status: ACTIVE | Noted: 2017-03-23

## 2023-08-22 PROBLEM — R00.2 PALPITATIONS: Status: ACTIVE | Noted: 2019-03-06

## 2023-08-22 PROBLEM — K56.600 PARTIAL OBSTRUCTION OF SMALL INTESTINE: Status: ACTIVE | Noted: 2020-04-20

## 2023-08-22 PROBLEM — Z23 NEED FOR INFLUENZA VACCINATION: Status: ACTIVE | Noted: 2018-10-30

## 2023-08-22 PROBLEM — J30.9 ALLERGIC RHINITIS: Status: ACTIVE | Noted: 2017-03-23

## 2023-08-22 PROBLEM — Z92.29 HISTORY OF VACCINATION: Status: ACTIVE | Noted: 2022-06-23

## 2023-08-22 PROBLEM — E03.9 HYPOTHYROIDISM: Status: ACTIVE | Noted: 2017-03-23

## 2023-08-22 PROBLEM — R60.0 EDEMA OF FOOT: Status: ACTIVE | Noted: 2019-07-01

## 2023-08-22 PROBLEM — K50.90 CROHN'S DISEASE: Status: ACTIVE | Noted: 2017-03-23

## 2023-08-22 PROBLEM — M25.569 KNEE PAIN: Status: ACTIVE | Noted: 2019-03-06

## 2023-08-22 PROBLEM — M25.559 HIP PAIN: Status: ACTIVE | Noted: 2020-09-24

## 2023-08-22 PROBLEM — I10 BENIGN ESSENTIAL HYPERTENSION: Status: ACTIVE | Noted: 2022-06-23

## 2023-08-22 PROBLEM — G47.30 SLEEP APNEA: Status: ACTIVE | Noted: 2017-03-23

## 2023-08-22 PROBLEM — M85.80 OSTEOPENIA: Status: ACTIVE | Noted: 2018-06-04

## 2023-08-22 PROBLEM — M25.519 SHOULDER JOINT PAIN: Status: ACTIVE | Noted: 2017-03-23

## 2023-08-22 PROBLEM — U07.1 COVID-19: Status: ACTIVE | Noted: 2021-09-15

## 2023-08-22 PROBLEM — H40.9 GLAUCOMA (INCREASED EYE PRESSURE): Status: ACTIVE | Noted: 2017-03-23

## 2023-08-22 PROBLEM — R42 VERTIGO: Status: ACTIVE | Noted: 2017-09-08

## 2023-08-22 PROBLEM — R73.9 HYPERGLYCEMIA: Status: ACTIVE | Noted: 2019-02-28

## 2023-08-22 PROBLEM — E66.01 MORBID OBESITY WITH BODY MASS INDEX (BMI) OF 40.0 OR HIGHER: Status: ACTIVE | Noted: 2023-08-22

## 2023-08-22 PROBLEM — K43.9 VENTRAL HERNIA: Status: ACTIVE | Noted: 2020-04-13

## 2023-08-22 PROBLEM — M15.9 DEGENERATIVE JOINT DISEASE INVOLVING MULTIPLE JOINTS: Status: ACTIVE | Noted: 2019-03-06

## 2023-08-22 PROBLEM — Z12.31 ENCOUNTER FOR SCREENING MAMMOGRAM FOR MALIGNANT NEOPLASM OF BREAST: Status: ACTIVE | Noted: 2017-04-10

## 2023-08-22 PROBLEM — Z78.0 POSTMENOPAUSAL STATE: Status: ACTIVE | Noted: 2018-05-03

## 2023-08-22 PROBLEM — F32.A DEPRESSIVE DISORDER: Status: ACTIVE | Noted: 2017-03-23

## 2023-08-22 PROBLEM — Z99.3 WHEELCHAIR DEPENDENCE: Status: ACTIVE | Noted: 2022-06-23

## 2023-08-22 PROBLEM — E55.9 VITAMIN D DEFICIENCY: Status: ACTIVE | Noted: 2020-09-24

## 2023-08-22 PROBLEM — Z87.19 HISTORY OF GASTROESOPHAGEAL REFLUX (GERD): Status: ACTIVE | Noted: 2017-03-23

## 2023-08-22 PROBLEM — S82.009A FRACTURE OF PATELLA: Status: ACTIVE | Noted: 2019-07-01

## 2023-08-22 PROBLEM — D38.5: Status: ACTIVE | Noted: 2022-06-23

## 2023-08-22 PROBLEM — E78.5 HYPERLIPIDEMIA: Status: ACTIVE | Noted: 2017-03-23

## 2023-08-22 PROBLEM — G47.33 OBSTRUCTIVE SLEEP APNEA OF ADULT: Status: ACTIVE | Noted: 2017-03-23

## 2023-08-22 PROBLEM — E05.90 HYPERTHYROIDISM: Status: ACTIVE | Noted: 2023-08-22

## 2023-08-22 RX ORDER — ALENDRONATE SODIUM 70 MG/1
70 TABLET ORAL
COMMUNITY
Start: 2023-07-14 | End: 2024-07-13

## 2023-08-28 ENCOUNTER — OFFICE VISIT (OUTPATIENT)
Dept: SURGERY | Facility: CLINIC | Age: 68
End: 2023-08-28
Payer: MEDICARE

## 2023-08-28 VITALS
BODY MASS INDEX: 75.9 KG/M2 | SYSTOLIC BLOOD PRESSURE: 140 MMHG | TEMPERATURE: 97.6 F | DIASTOLIC BLOOD PRESSURE: 90 MMHG | HEART RATE: 65 BPM | OXYGEN SATURATION: 95 % | HEIGHT: 62 IN

## 2023-08-28 DIAGNOSIS — A63.0 CONDYLOMA: Primary | ICD-10-CM

## 2023-08-28 DIAGNOSIS — K62.5 RECTAL BLEED: ICD-10-CM

## 2023-08-28 PROCEDURE — 99203 OFFICE O/P NEW LOW 30 MIN: CPT | Performed by: COLON & RECTAL SURGERY

## 2023-08-28 PROCEDURE — 3077F SYST BP >= 140 MM HG: CPT | Performed by: COLON & RECTAL SURGERY

## 2023-08-28 PROCEDURE — 3080F DIAST BP >= 90 MM HG: CPT | Performed by: COLON & RECTAL SURGERY

## 2023-08-28 PROCEDURE — 1160F RVW MEDS BY RX/DR IN RCRD: CPT | Performed by: COLON & RECTAL SURGERY

## 2023-08-28 PROCEDURE — 1159F MED LIST DOCD IN RCRD: CPT | Performed by: COLON & RECTAL SURGERY

## 2023-08-28 RX ORDER — DOXYCYCLINE HYCLATE 100 MG/1
CAPSULE ORAL
COMMUNITY
Start: 2023-07-14

## 2023-08-28 RX ORDER — BACLOFEN 10 MG/1
TABLET ORAL
COMMUNITY
Start: 2023-08-26

## 2023-08-28 NOTE — PROGRESS NOTES
Susanne Harper is a 68 y.o. female who is seen as a consult at the request of NAZIA Silva for Consult (Pt here today with  Daniel Harper.).      HPI:    The patient was referred to this office due to anal lesion. Her most recent colonoscopy was in 03/29/2023 by Dr Cazares. The patient's BMI is 76. The patient's colonoscopy only went to the transverse colon because of looping. She had a barium enema that revealed diverticulosis. There are medical notes in [facility] stating that patient had some condyloma.     The patient reports that symptoms of warts located in the rectum started after being seen by her physician. She was then referred to the dermatologist. The dermatologist referred patient to a colorectal surgeon due to the location of skin tags and to have them removed via laser surgery. The GI physician stated that symptoms appeared as rectal warts. The patient reports that she could have used a cream medication nightly, but she was told that it could have been painful. The patient reports that an individual would have to apply cream medication because she is not able to reach her rectum. She denies being able to reach her anus to wipe and reports using a bidet. She does have some rectal bleeding occasionally that is bright red. The patient reports being told by the physician that she has some hemorrhoids. She also has pictures of the colonoscopy, today. The patient denies having symptoms of pain, but she reports having a weird sensation. The dermatologist also took 2 or 3 pictures, according to the patient. She was told by the dermatologist that symptoms are skin tags or possibly hemorrhoids. The patient is not able to apply a cream medication because she is not able to reach.       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 CINDY ESCOBAR    COVID-19 09/15/2021    Crohn's disease     DDD (degenerative disc disease), lumbar     DDD (degenerative disc disease), thoracic     Degenerative joint disease involving multiple joints 03/06/2019    Depression     Generalized edema 06/2023    GERD (gastroesophageal reflux disease)     Glaucoma (increased eye pressure) 03/23/2017    History of acute renal failure     after surgery    History of transfusion     Hyperglycemia 02/28/2019    Hyperlipidemia     Hypersomatotropism 07/2018    Hypertension     Hypothyroidism     Lumbar radiculopathy     Obstructive sleep apnea     Osteoarthritis     Osteopenia     Osteoporosis     Palpitations 03/06/2019    Partial obstruction of small intestine 04/20/2020    Partial small bowel obstruction 04/12/2020    ADMITTED TO [facility]    Peripheral edema 08/2021    PLMD (periodic limb movement disorder)     Rectal bleeding ?    Respiratory failure, post-operative     on vent x3 days 12/2015    Vertigo     Vitamin D deficiency 09/24/2020    Wheelchair dependence 06/23/2022       Past Surgical History:   Procedure Laterality Date    APPENDECTOMY  Same time hysterectomoy    COLONOSCOPY N/A 11/14/2014    MILD SIGMOID DIVERTICULOSIS, HEMORRHOIDS, RANDOM COLON BIOPSIES BENIGN, DR. LYNDSAY LARRY AT [facility]    COLONOSCOPY N/A 01/08/2020    5 BENIGN RECTAL POLYPS, DIVERTICULOSIS ON LEFT SIDE, RESCOPE IN 5 YRS, DR. LYNDSAY LARRY AT  [facility]    COLONOSCOPY N/A 03/29/2023    EXCESSIVE LOOPING AND ADHESIONS, DR. CINDY ESCOBAR AT [facility]    COLONOSCOPY N/A 2007    EXPLORATORY LAPAROTOMY N/A 12/21/2015    AND PARTIAL OMENECTOMY, DR. ABE CASTILLO AT [facility]    GANGLION CYST EXCISION Right 1983    HYSTERECTOMY N/A 2007    SINUS ENDOSCOPY Bilateral 07/05/2023    (+) CARCINOMA, DR. LUPILLO ZIEGLER AT  [facility]    TONSILLECTOMY AND ADENOIDECTOMY Bilateral      UMBILICAL HERNIA REPAIR N/A 12/18/2015    LAPAROSCOPIC, DR. ABE CASTILLO AT [facility]    VENTRAL/INCISIONAL HERNIA REPAIR N/A 11/30/2016    Procedure: VENTRAL/INCISIONAL HERNIA REPAIR;  Surgeon: Sumit Logan MD;  Location: [facility] OR;  Service:        Social History:   reports that she has never smoked. She has never been exposed to tobacco smoke. She has never used smokeless tobacco. She reports that she does not drink alcohol and does not use drugs.      Marriage status:     Family History   Problem Relation Age of Onset    Arthritis Mother     Congenital heart disease Sister     Diabetes Maternal Grandmother     Glaucoma Maternal Grandmother     Heart disease Maternal Grandfather     Cancer Maternal Grandfather     Heart disease Paternal Grandfather     Breast cancer Neg Hx     Colon cancer Neg Hx     Colon polyps Neg Hx          Current Outpatient Medications:     alendronate (FOSAMAX) 70 MG tablet, Take 1 tablet by mouth Every 7 (Seven) Days., Disp: , Rfl:     azaTHIOprine (IMURAN) 50 MG tablet, TAKE FOUR AND ONE-HALF TABLETS BY MOUTH ONCE DAILY, Disp: 405 tablet, Rfl: 3    B Complex Vitamins (VITAMIN B COMPLEX PO), Take  by mouth., Disp: , Rfl:     baclofen (LIORESAL) 10 MG tablet, , Disp: , Rfl:     Biotin 1 MG capsule, 5,000 mcg., Disp: , Rfl:     Black Pepper-Turmeric (Turmeric Curcumin) 5-1000 MG capsule, 2 (Two) Times a Day., Disp: , Rfl:     Calcium Carbonate-Vitamin D (CALTRATE 600+D PO), Take  by mouth Daily., Disp: , Rfl:     celecoxib (CeleBREX) 200 MG capsule, Take 1 capsule by mouth Daily., Disp: , Rfl:     doxycycline (VIBRAMYCIN) 100 MG capsule, , Disp: , Rfl:     fluticasone (FLONASE) 50 MCG/ACT nasal spray, 2 sprays into the nostril(s) as directed by provider Daily., Disp: , Rfl:     hydroCHLOROthiazide (HYDRODIURIL) 25 MG tablet, Take 1 tablet by mouth Daily., Disp: , Rfl:     latanoprost (XALATAN) 0.005 % ophthalmic solution, , Disp: , Rfl:     levocetirizine (XYZAL) 5 MG tablet, Take  1 tablet by mouth Every Morning., Disp: , Rfl:     levothyroxine (SYNTHROID, LEVOTHROID) 50 MCG tablet, Take 1 tablet by mouth Daily., Disp: , Rfl:     lisinopril-hydrochlorothiazide (PRINZIDE,ZESTORETIC) 20-12.5 MG per tablet, Take 1 tablet by mouth 2 (Two) Times a Day Before Meals., Disp: , Rfl:     magnesium, as, gluconate (MAGONATE) 500 (27 Mg) MG tablet, Take 1 tablet by mouth 2 (Two) Times a Day., Disp: , Rfl:     montelukast (SINGULAIR) 10 MG tablet, , Disp: , Rfl:     omeprazole (PriLOSEC) 20 MG capsule, Take 1 capsule by mouth Daily., Disp: , Rfl:     potassium chloride (K-DUR,KLOR-CON) 20 MEQ tablet controlled-release ER tablet, Take 1 tablet by mouth Daily., Disp: , Rfl:     pravastatin (PRAVACHOL) 80 MG tablet, Take 1 tablet by mouth Daily., Disp: , Rfl:     sertraline (ZOLOFT) 50 MG tablet, Take 1 tablet by mouth every night at bedtime., Disp: , Rfl:     vitamin C (ASCORBIC ACID) 250 MG tablet, Take 1 tablet by mouth Daily., Disp: , Rfl:     BACLOFEN 10 MG/5ML ORAL SUSPENSION, Take 5 mL by mouth., Disp: , Rfl:     Allergy  Codeine and Penicillins    Review of Systems   Constitutional: Negative for decreased appetite and weight gain.   HENT:  Negative for congestion, hearing loss and hoarse voice.    Eyes:  Negative for blurred vision, discharge and visual disturbance.   Cardiovascular:  Positive for leg swelling. Negative for chest pain and cyanosis.   Respiratory:  Negative for cough, shortness of breath, sleep disturbances due to breathing and snoring.    Endocrine: Negative for cold intolerance and heat intolerance.   Hematologic/Lymphatic: Does not bruise/bleed easily.   Skin:  Negative for itching, poor wound healing and skin cancer.   Musculoskeletal:  Positive for back pain and joint swelling. Negative for arthritis and joint pain.   Gastrointestinal:  Positive for hematochezia and hemorrhoids. Negative for abdominal pain, change in bowel habit, bowel incontinence and constipation.    Genitourinary:  Negative for bladder incontinence, dysuria and hematuria.   Neurological:  Negative for brief paralysis, excessive daytime sleepiness, dizziness, focal weakness, headaches, light-headedness and weakness.   Psychiatric/Behavioral:  Negative for altered mental status and hallucinations. The patient does not have insomnia.    Allergic/Immunologic: Negative for HIV exposure and persistent infections.   All other systems reviewed and are negative.    Vitals:    08/28/23 1500   BP: 140/90   Pulse: 65   Temp: 97.6 °F (36.4 °C)   SpO2: 95%     Body mass index is 75.9 kg/m².    Physical Exam  Constitutional:       Appearance: She is well-developed.   HENT:      Head: Normocephalic and atraumatic.   Eyes:      Pupils: Pupils are equal, round, and reactive to light.   Cardiovascular:      Rate and Rhythm: Regular rhythm.   Pulmonary:      Effort: Pulmonary effort is normal.   Abdominal:      General: There is no distension.      Palpations: Abdomen is soft.   Genitourinary:     Comments: Perianal exam : 2-3 mm condyloma x 4  Musculoskeletal:         General: Normal range of motion.   Skin:     General: Skin is warm and dry.   Neurological:      Mental Status: She is alert and oriented to person, place, and time.   Psychiatric:         Thought Content: Thought content normal.         Judgment: Judgment normal.       Review of Medical Record:  I reviewed medical records as detailed in HPI.     Assessment:  1. Condyloma    2. Rectal bleed          Plan:  - She can continue using the bidet.   We discussed her using Aldara or Condylox.  Patient states she cannot reach her anus.  I discussed with patient that they are so small and do not appear concerning I would not do anything to them.  Patient can follow-up as needed      Transcribed from ambient dictation for Kevin Tay MD by Melanie HARLEY.  08/28/23   18:57 EDT    Patient or patient representative verbalized consent to the visit recording.  I have  personally performed the services described in this document as transcribed by the above individual, and it is both accurate and complete.

## 2023-09-27 NOTE — PROGRESS NOTES
Ambulatory Care Coordination Note  2023    Patient Current Location: [place]     ACM contacted the patient by telephone. Verified name and  with patient as identifiers. Provided introduction to self, and explanation of the ACM role. Challenges to be reviewed by the provider   Additional needs identified to be addressed with provider: No    none         Method of communication with provider: none. ACM: Attila Waldrop RN    Patient was called for continued Care Coordination follow up and education re: the management of his CHF, hypertension, and healthcare needs. Patient reported he has been doing well and he denied any c/o increased SOB, cough, or swelling being present. Patient has started using RPM equipment as ordered and recent readings reviewed. RPM VS today: 124/81-66-97%-210.4 pounds. RPM Welcome call information reviewed and patient verbalized understanding. Patient was educated on the importance of daily weight and BP monitoring and ACM educated on how to check weight and BP daily and reviewed signs/symptoms to report and the importance of early symptom recognition and follow up. Patient verbalized understanding. CHF zone education and signs/symptoms to report reviewed with patient and patient acknowledged understanding. Patient denied any other questions, concerns, or needs and he was encouraged to call with any that may develop.           Actions:   - Reviewed CHF education and zone information   - Reviewed importance of daily weight and BP monitoring   - Reviewed RPM program and completed Welcome Call   - Educated on signs/symptoms to report and the importance of early symptom recognition and follow up  - Monitored for additional needs          Plan of Care:   - Continue with Care Coordination f/u calls for assistance with the management of his CHF, hypertension, and healthcare needs   - Complete CHF education - In Process   - Complete Fall Prevention/Home Safety Education - In RM:________    Referral Provider: No ref. provider found Yolanda Marshall PA    NEW PATIENT/ CONSULT  PREVIOUS CARDIOLOGIST:   CARDIAC TESTING:     : 1955   AGE: 65 y.o.    2021  REASON FOR VISIT/  CC:      WT: ____________ BP: __________L __________R/ HR_______________    CHEST PAIN: _____________    SOA: ____________PALPS: __________      LIGHTHEADED: ___________ FATIGUE: _______________ EDEMA______________  ALLERGIES:  Codeine and Penicillins  SMOKING HISTORY  Social History     Tobacco Use   • Smoking status: Never Smoker   • Smokeless tobacco: Never Used   Substance Use Topics   • Alcohol use: No   • Drug use: No          CHILDREN: _______________________       CAFFEINE USE________  ALCOHOL _____________  OCCUPATION_____________  Past Surgical History:   Procedure Laterality Date   • COLONOSCOPY     • COLONOSCOPY N/A 2020    Procedure: COLONOSCOPY , polypectomy with biopsies;  Surgeon: Shantal Greenwood MD;  Location: [facility];  Service: Gastroenterology   • GANGLION CYST EXCISION     • HYSTERECTOMY     • OMENTECTOMY     • TONSILLECTOMY     • UMBILICAL HERNIA REPAIR  2015   • VENTRAL/INCISIONAL HERNIA REPAIR N/A 2016    Procedure: VENTRAL/INCISIONAL HERNIA REPAIR;  Surgeon: Sumit Logan MD;  Location: [facility];  Service:                 FAMILY HISTORY  HEART DISEASE  CHF  DIABETES  CARDIAC ARREST  STROKE  CANCER  ANEURYSM                                                             H/O: MI_____   STROKE________   GOUT_____   ANEMIA______     CAROTID________ HIV____ CAD_______ HYPERCHOL _____    H/O: CHF _____   RF____ DM___ HTN_______PVD____THYROID DISEASE_______    PMH: GI ____   HEPATITIS ___ KIDNEY DISEASE ___ LUNG DISEASE _______     SLEEP APNEA ____ BLOOD CLOTS ____ DVT ____ VEIN STRIPPING ___________     CANCER _________________________________ CHEMO/ RADIATION__________

## 2023-10-23 ENCOUNTER — OFFICE VISIT (OUTPATIENT)
Dept: SLEEP MEDICINE | Facility: HOSPITAL | Age: 68
End: 2023-10-23
Payer: MEDICARE

## 2023-10-23 VITALS
DIASTOLIC BLOOD PRESSURE: 73 MMHG | BODY MASS INDEX: 53.92 KG/M2 | WEIGHT: 293 LBS | SYSTOLIC BLOOD PRESSURE: 123 MMHG | HEIGHT: 62 IN | OXYGEN SATURATION: 96 % | HEART RATE: 62 BPM

## 2023-10-23 DIAGNOSIS — G47.33 OBSTRUCTIVE SLEEP APNEA: Primary | ICD-10-CM

## 2023-10-23 DIAGNOSIS — E66.9 SUPER OBESE: ICD-10-CM

## 2023-10-23 PROCEDURE — G0463 HOSPITAL OUTPT CLINIC VISIT: HCPCS

## 2023-10-23 NOTE — PROGRESS NOTES
[facility] [place] [facility]  [address]  [place] [address]  [phone]    PCP: Yolanda Marshall PA    Reason for visit:  Sleep disorders: KELLY    Susanne is a 68 y.o.female who was seen in the [facility] today. Annual fu. Was having humidifier issues last visit. New machine ordered with settings 10-12 but Rake apparently replaced her machine. She sleeps from 2am to 10am. She feels the pr are good and she wakes up rested. She uses nasal cushion. She replaces every 3 months.  Oxford Sleepiness Scale is 6. Caffeine 4 per day. Alcohol 0 per week.    Susanne  reports that she has never smoked. She has never been exposed to tobacco smoke. She has never used smokeless tobacco.    Pertinent Positive Review of Systems of denies  Rest of Review of Systems was negative as recorded in Sleep Questionnaire.    Patient  has a past medical history of Allergic rhinitis, Anal lesion (06/2023), Anxiety, Aortic valve sclerosis (07/2021), 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), Cellulitis of lower leg (12/2022), Closed patellar sleeve fracture of  right knee (09/16/2019), Colon polyps, COVID-19 (09/15/2021), Crohn's disease, DDD (degenerative disc disease), lumbar, DDD (degenerative disc disease), thoracic, Degenerative joint disease involving multiple joints (03/06/2019), Depression, Generalized edema (06/2023), GERD (gastroesophageal reflux disease), Glaucoma (increased eye pressure) (03/23/2017), History of acute renal failure, History of transfusion, Hyperglycemia (02/28/2019), Hyperlipidemia, Hypersomatotropism (07/2018), Hypertension, Hypothyroidism, Lumbar radiculopathy, Obstructive sleep apnea, Osteoarthritis, Osteopenia, Osteoporosis, Palpitations (03/06/2019), Partial obstruction of small intestine (04/20/2020), Partial small bowel obstruction (04/12/2020), Peripheral edema (08/2021), PLMD (periodic limb movement disorder), Rectal bleeding (?), Respiratory failure, post-operative,  Vertigo, Vitamin D deficiency (09/24/2020), and Wheelchair dependence (06/23/2022).     Current Medications:    Current Outpatient Medications:     alendronate (FOSAMAX) 70 MG tablet, Take 1 tablet by mouth Every 7 (Seven) Days., Disp: , Rfl:     azaTHIOprine (IMURAN) 50 MG tablet, TAKE FOUR AND ONE-HALF TABLETS BY MOUTH ONCE DAILY, Disp: 405 tablet, Rfl: 3    B Complex Vitamins (VITAMIN B COMPLEX PO), Take  by mouth., Disp: , Rfl:     baclofen (LIORESAL) 10 MG tablet, , Disp: , Rfl:     BACLOFEN 10 MG/5ML ORAL SUSPENSION, Take 5 mL by mouth., Disp: , Rfl:     Biotin 1 MG capsule, 5,000 mcg., Disp: , Rfl:     Black Pepper-Turmeric (Turmeric Curcumin) 5-1000 MG capsule, 2 (Two) Times a Day., Disp: , Rfl:     Calcium Carbonate-Vitamin D (CALTRATE 600+D PO), Take  by mouth Daily., Disp: , Rfl:     celecoxib (CeleBREX) 200 MG capsule, Take 1 capsule by mouth Daily., Disp: , Rfl:     doxycycline (VIBRAMYCIN) 100 MG capsule, , Disp: , Rfl:     fluticasone (FLONASE) 50 MCG/ACT nasal spray, 2 sprays into the nostril(s) as directed by provider Daily., Disp: , Rfl:     hydroCHLOROthiazide (HYDRODIURIL) 25 MG tablet, Take 1 tablet by mouth Daily., Disp: , Rfl:     latanoprost (XALATAN) 0.005 % ophthalmic solution, , Disp: , Rfl:     levocetirizine (XYZAL) 5 MG tablet, Take 1 tablet by mouth Every Morning., Disp: , Rfl:     levothyroxine (SYNTHROID, LEVOTHROID) 50 MCG tablet, Take 1 tablet by mouth Daily., Disp: , Rfl:     lisinopril-hydrochlorothiazide (PRINZIDE,ZESTORETIC) 20-12.5 MG per tablet, Take 1 tablet by mouth 2 (Two) Times a Day Before Meals., Disp: , Rfl:     magnesium, as, gluconate (MAGONATE) 500 (27 Mg) MG tablet, Take 1 tablet by mouth 2 (Two) Times a Day., Disp: , Rfl:     montelukast (SINGULAIR) 10 MG tablet, , Disp: , Rfl:     omeprazole (PriLOSEC) 20 MG capsule, Take 1 capsule by mouth Daily., Disp: , Rfl:     potassium chloride (K-DUR,KLOR-CON) 20 MEQ tablet controlled-release ER tablet, Take 1 tablet by  "mouth Daily., Disp: , Rfl:     pravastatin (PRAVACHOL) 80 MG tablet, Take 1 tablet by mouth Daily., Disp: , Rfl:     sertraline (ZOLOFT) 50 MG tablet, Take 1 tablet by mouth every night at bedtime., Disp: , Rfl:     vitamin C (ASCORBIC ACID) 250 MG tablet, Take 1 tablet by mouth Daily., Disp: , Rfl:    also entered in Sleep Questionnaire         Vital Signs: /73   Pulse 62   Ht 157.5 cm (62\")   Wt (!) 172 kg (380 lb)   LMP  (LMP Unknown)   SpO2 96%   BMI 69.50 kg/m²     Body mass index is 69.5 kg/m².       Tongue: Large       Dentition: good       Pharynx: Posterior pharyngeal pillars are wide   Mallampatti: III (soft and hard palate and base of uvula visible)        General: Alert. Cooperative. Well developed. No acute distress.             Head:  Normocephalic. Symmetrical. Atraumatic.              Nose: No septal deviation. No drainage.          Throat: No oral lesions. No thrush. Moist mucous membranes.    Chest Wall:  Normal shape. Symmetric expansion with respiration. No tenderness.             Neck:  Trachea midline.           Lungs:  Clear to auscultation bilaterally. No wheezes. No rhonchi. No rales. Respirations regular, even and unlabored.            Heart:  Regular rhythm and normal rate. Normal S1 and S2. No murmur.     Abdomen:  Soft, non-tender and non-distended. Normal bowel sounds. No masses.  Extremities:  Moves all extremities well. No edema.    Psychiatric: Normal mood and affect.    Diagnostic data available to date is as below and was reviewed on current visit:  5/22/17  Overnight split polysomnogram study that was completed as diagnostic only since sufficient severity of obstructive sleep apnea was not present.  Sleep efficiency was 68.7% with 4.44 hours of total sleep time.  AHI index is 5.2.  Sleep architecture was abnormal with absence of REM sleep.  Due to the absence of REM sleep severity may be underestimated.  Oxygen saturation remained above 89%.  PLM index increased to " "69.6 with PLM arousal index of 4.1.  Snoring noted for 40.7% of total sleep time.  7/28/18 Nocox on CPAP 11cms: No significant desat    No results found for: \"IRON\", \"TIBC\", \"FERRITIN\"    Most current available usage data reviewed on 10/23/2023:        [employer]: [employer]    Prescription to DME for replacement supplies as below:    nasal mask      Description Replacement    Nasal PILLOWS      A 7034 Nasal Pillows  every 3 mth    A 7033 Repl Nasal Pillows  2 per mth    Nasal MASK/CUSHION     x A 7034 Nasal Mask/Cushion  every 3 mth   x A 7032 Repl Nasal Mask/Cushion  2 per mth    Full Face MASK      A 7030 Full Face Mask  every 3 mth    A 7031 Repl Face Mask  1 per mth      A 4604 Heated Tubing  every 3 mth    A 7037 Standard Tubing  every 3 mth   x A 7035 Headgear  every 3 mth   x A 7046 Repl Humidifier Chamber  every 6 yrs   x A 7038 Disposable Filters  2 per mth   x A 7039 Non-disposable Filter  every 6 mth   x A 7036 Chin Strap  every 6 mth     Orders Placed This Encounter   Procedures    SCANNED - PULMONARY RESULTS          Impression:  1. Obstructive sleep apnea    2. Super obese        Plan:  Susanne is compliant and was able to get replacement from [employer] for her previous device. Now working well and pr are comfortable. No air leaks with nasal cushions. Keep same pr.    I reiterated the importance of effective treatment of obstructive sleep apnea with PAP machine.  Cardiovascular health risks of untreated sleep apnea were again reviewed.  Patient was asked to remain cautious if there is persistent hypersomnolence. The benefit of weight loss in reducing severity of obstructive sleep apnea was discussed.  Patient would benefit from adhering to a strict diet to achieve ideal BMI.     Change of PAP supplies regularly is important for effective use.  Change of cushion on the mask or plugs on nasal pillows along with disposable filters once every month and change of mask frame, tubing, headgear and Velcro straps " every 6 months at the minimum was reiterated.    This patient is compliant with PAP machine and benefits from its use.  Apnea hypopneas index is corrected/improved.  Daytime hypersomnolence has resolved.     Patient will follow up in this clinic in 1 year aprn.    Thank you for allowing me to participate in your patient's care.    Electronically signed by Hang Roberts MD, 10/23/23, 12:51 PM EDT.    Part of this note may be an electronic transcription/translation of spoken language to printed text using the Dragon Dictation System.

## 2023-12-21 ENCOUNTER — LAB (OUTPATIENT)
Dept: LAB | Facility: HOSPITAL | Age: 68
End: 2023-12-21
Payer: MEDICARE

## 2023-12-21 ENCOUNTER — OFFICE VISIT (OUTPATIENT)
Dept: GASTROENTEROLOGY | Facility: CLINIC | Age: 68
End: 2023-12-21
Payer: MEDICARE

## 2023-12-21 VITALS
HEIGHT: 62 IN | SYSTOLIC BLOOD PRESSURE: 122 MMHG | WEIGHT: 293 LBS | DIASTOLIC BLOOD PRESSURE: 82 MMHG | BODY MASS INDEX: 53.92 KG/M2

## 2023-12-21 DIAGNOSIS — K50.00 CROHN'S DISEASE OF ILEUM WITHOUT COMPLICATION: Primary | ICD-10-CM

## 2023-12-21 DIAGNOSIS — K50.00 CROHN'S DISEASE OF ILEUM WITHOUT COMPLICATION: ICD-10-CM

## 2023-12-21 DIAGNOSIS — E78.5 HYPERLIPIDEMIA, UNSPECIFIED HYPERLIPIDEMIA TYPE: ICD-10-CM

## 2023-12-21 LAB
BASOPHILS # BLD AUTO: 0.09 10*3/MM3 (ref 0–0.2)
BASOPHILS NFR BLD AUTO: 1.1 % (ref 0–1.5)
DEPRECATED RDW RBC AUTO: 44.6 FL (ref 37–54)
EOSINOPHIL # BLD AUTO: 0.29 10*3/MM3 (ref 0–0.4)
EOSINOPHIL NFR BLD AUTO: 3.5 % (ref 0.3–6.2)
ERYTHROCYTE [DISTWIDTH] IN BLOOD BY AUTOMATED COUNT: 12.8 % (ref 12.3–15.4)
HCT VFR BLD AUTO: 40.5 % (ref 34–46.6)
HGB BLD-MCNC: 13.5 G/DL (ref 12–15.9)
IMM GRANULOCYTES # BLD AUTO: 0.05 10*3/MM3 (ref 0–0.05)
IMM GRANULOCYTES NFR BLD AUTO: 0.6 % (ref 0–0.5)
LYMPHOCYTES # BLD AUTO: 1.66 10*3/MM3 (ref 0.7–3.1)
LYMPHOCYTES NFR BLD AUTO: 20 % (ref 19.6–45.3)
MCH RBC QN AUTO: 31.6 PG (ref 26.6–33)
MCHC RBC AUTO-ENTMCNC: 33.3 G/DL (ref 31.5–35.7)
MCV RBC AUTO: 94.8 FL (ref 79–97)
MONOCYTES # BLD AUTO: 0.91 10*3/MM3 (ref 0.1–0.9)
MONOCYTES NFR BLD AUTO: 11 % (ref 5–12)
NEUTROPHILS NFR BLD AUTO: 5.28 10*3/MM3 (ref 1.7–7)
NEUTROPHILS NFR BLD AUTO: 63.8 % (ref 42.7–76)
NRBC BLD AUTO-RTO: 0 /100 WBC (ref 0–0.2)
PLATELET # BLD AUTO: 316 10*3/MM3 (ref 140–450)
PMV BLD AUTO: 10.5 FL (ref 6–12)
RBC # BLD AUTO: 4.27 10*6/MM3 (ref 3.77–5.28)
WBC NRBC COR # BLD AUTO: 8.28 10*3/MM3 (ref 3.4–10.8)

## 2023-12-21 PROCEDURE — 83520 IMMUNOASSAY QUANT NOS NONAB: CPT

## 2023-12-21 PROCEDURE — 85025 COMPLETE CBC W/AUTO DIFF WBC: CPT

## 2023-12-21 PROCEDURE — 86141 C-REACTIVE PROTEIN HS: CPT

## 2023-12-21 PROCEDURE — 36415 COLL VENOUS BLD VENIPUNCTURE: CPT

## 2023-12-21 PROCEDURE — 82397 CHEMILUMINESCENT ASSAY: CPT

## 2023-12-21 NOTE — PROGRESS NOTES
PATIENT INFORMATION  Susanne Harper       - 1955    CHIEF COMPLAINT  Chief Complaint   Patient presents with   • Crohn's Disease       HISTORY OF PRESENT ILLNESS  Here today for Crohns follow-up     Crohns ileitis diagnosed . Maintained on azathioprine, at LOV instructed to see derm for increased risk of NMSC. Most recent CBC normal.     Bowels were 1-2 a day with no abdominal pain. Easy to pass. Abd pain is rare, less than weekly.  Reviewed at LOV without ability to complete colonoscopy for bx of colon, would like fecal ubaldo as screening. Never returned. Will plan for promethius monitr today.      3/29/2023 last colon incomplete due to looping and adhesions. Normal barium enema.     Last Dexa 5/3/2018 with osteopenia hip.    Concern with [employer] and will be requesting continuity of care with [facility] providers.      Crohn's Disease  Associated symptoms include neck pain. Pertinent negatives include no abdominal pain, nausea or vomiting.       REVIEWED PERTINENT RESULTS/ LABS  Lab Results   Component Value Date    CASEREPORT  2020     Surgical Pathology Report                         Case: [id number]                                  Authorizing Provider:  Shantal Greenwood MD         Collected:           2020 09:04 AM          Ordering Location:     [facility]   Received:            2020 10:28 AM                                 OR                                                                           Pathologist:           Ashok Garcia MD                                                         Specimens:   1) - Small Intestine, terminal ileum biopsy                                                         2) - Large Intestine, Right / Ascending Colon, right colon biopsy                                   3) - Large Intestine, Transverse Colon, transverse colon  biopsy                                    4) - Large Intestine, Left / Descending Colon, left colon biopsy                                     5) - Large Intestine, Rectum, rectal polyp x5                                                       6) - Large Intestine, Rectum, randum rectal biopsies                                       FINALDX  01/08/2020     1. Terminal Ileum, Biopsy: Benign small bowel mucosa with   A. Normal intact villous surface.   B. No significant inflammation, no granulomas.   C. No viral inclusions or other organisms on routinely stained sections.    2. Colon, Right, Biopsy: Benign colonic mucosa with   A. No hyperplastic or tubulovillous change.   B. No significant inflammation.   C. No crypt distortion or basement membrane thickening.   D. No viral inclusions or other organisms on routinely stained sections.    3. Colon, Transverse, Biopsy: Benign colonic mucosa with   A. No hyperplastic or tubulovillous change.   B. No significant inflammation.   C. No crypt distortion or basement membrane thickening.   D. No viral inclusions or other organisms on routinely stained sections.    4. Colon, Left, Biopsy: Benign colonic mucosa with   A. No hyperplastic or tubulovillous change.   B. No significant inflammation.   C. No crypt distortion or basement membrane thickening.   D. No viral inclusions or other organisms on routinely stained sections.    5. Rectum, Biopsy:    A. Hyperplastic polyps.    6. Rectum, Random, Biopsy: Benign colonic mucosa with   A. A single fragment suggestive of developing hyperplastic polyp.    jalil/pkm           Lab Results   Component Value Date    HGB 13.5 12/21/2023    MCV 94.8 12/21/2023     12/21/2023    ALT 24 08/16/2021    AST 30 08/16/2021    HGBA1C 5.80 (H) 04/12/2020    INR 1.04 04/13/2020      No results found.    REVIEW OF SYSTEMS  Review of Systems   Constitutional: Negative.    HENT: Negative.     Eyes: Negative.    Respiratory: Negative.     Cardiovascular: Negative.    Gastrointestinal:  Negative for abdominal pain, constipation, diarrhea, nausea and vomiting.         Crohn's    Endocrine: Negative.    Genitourinary: Negative.    Musculoskeletal:  Positive for back pain and neck pain.   Skin: Negative.    Allergic/Immunologic: Negative.    Neurological: Negative.    Hematological:  Bruises/bleeds easily.   Psychiatric/Behavioral: Negative.           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80]    • Postmenopausal state [Z78.0]    • Vertigo [R42]    • Encounter for screening mammogram for malignant neoplasm of breast [Z12.31]    • Crohn's disease [K50.90]    • Hyperlipidemia [E78.5]    • Obstructive sleep apnea of adult [G47.33]    • Hypothyroidism [E03.9]    • History of gastroesophageal reflux (GERD) [Z87.19]    • Glaucoma (increased eye pressure) [H40.9]    • Depressive disorder [F32.A]    • Arthritis [M19.90]    • Allergic rhinitis [J30.9]    • Shoulder joint pain [M25.519]    • Sleep apnea [G47.30]    • Incisional hernia [K43.2]    • Gastroesophageal reflux disease [K21.9]    • Hypertensive disorder [I10]          PAST MEDICAL HISTORY  Past Medical History:   Diagnosis Date   • Allergic rhinitis    • Anal lesion 06/2023   • Anxiety     • Aortic valve sclerosis 07/2021   • 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 CINDY ESCOBAR   • COVID-19 09/15/2021   • Crohn's disease    • DDD (degenerative disc disease), lumbar    • DDD (degenerative disc disease), thoracic    • Degenerative joint disease involving multiple joints 03/06/2019   • Depression    • Generalized edema 06/2023   • GERD (gastroesophageal reflux disease)    • Glaucoma (increased eye pressure) 03/23/2017   • Hernia 2015/2016   • History of acute renal failure     after surgery   • History of transfusion    • Hyperglycemia 02/28/2019   • Hyperlipidemia    • Hypersomatotropism 07/2018   • Hypertension    • Hypothyroidism    • Lumbar radiculopathy    • Obstructive sleep apnea    • Osteoarthritis    • Osteopenia    • Osteoporosis    • Palpitations 03/06/2019   • Partial obstruction of small intestine 04/20/2020   • Partial small bowel obstruction 04/12/2020    ADMITTED TO [facility]   • Peripheral edema 08/2021   • PLMD (periodic limb movement disorder)    • Rectal bleeding ?   • Respiratory failure, post-operative     on vent x3 days 12/2015   • Vertigo    • Vitamin D deficiency 09/24/2020   • Wheelchair dependence 06/23/2022         SURGICAL HISTORY  Past Surgical History:   Procedure Laterality Date   • ABDOMINAL SURGERY  2015/2016   • APPENDECTOMY  Same time hysterectomoy   • COLONOSCOPY N/A 11/14/2014    MILD SIGMOID DIVERTICULOSIS, HEMORRHOIDS, RANDOM COLON BIOPSIES BENIGN, DR. LYNDSAY LARRY AT [facility]   • COLONOSCOPY N/A 01/08/2020    5 BENIGN RECTAL POLYPS, DIVERTICULOSIS ON LEFT SIDE, RESCOPE IN 5 YRS, DR. LYNDSAY LARRY AT [facility]   • COLONOSCOPY N/A 03/29/2023    EXCESSIVE LOOPING AND ADHESIONS, DR. CINDY ESCOBAR AT [facility]   • COLONOSCOPY N/A 2007   • EXPLORATORY LAPAROTOMY N/A 12/21/2015    AND PARTIAL  OMENECTOMY, DR. ABE CASTILLO AT [facility]   • GANGLION CYST EXCISION Right 1983   • HYSTERECTOMY N/A 2007   • SINUS ENDOSCOPY Bilateral 07/05/2023    (+) CARCINOMA, DR. LUPILLO ZIEGLER AT [facility]   • TONSILLECTOMY AND ADENOIDECTOMY Bilateral    • UMBILICAL HERNIA REPAIR N/A 12/18/2015    LAPAROSCOPIC, DR. ABE CASTILLO AT [facility]   • UPPER GASTROINTESTINAL ENDOSCOPY  ???   • VENTRAL/INCISIONAL HERNIA REPAIR N/A 11/30/2016    Procedure: VENTRAL/INCISIONAL HERNIA REPAIR;  Surgeon: Sumit Logan MD;  Location: [facility] OR;  Service:          FAMILY HISTORY  Family History   Problem Relation Age of Onset   • Arthritis Mother    • Congenital heart disease Sister    • Diabetes Maternal Grandmother    • Glaucoma Maternal Grandmother    • Heart disease Maternal Grandfather    • Cancer Maternal Grandfather    • Heart disease Paternal Grandfather    • Breast cancer Neg Hx    • Colon cancer Neg Hx    • Colon polyps Neg Hx          SOCIAL HISTORY  Social History     Occupational History   • Not on file   Tobacco Use   • Smoking status: Never     Passive exposure: Never   • Smokeless tobacco: Never   • Tobacco comments:     N/A   Vaping Use   • Vaping Use: Never used   Substance and Sexual Activity   • Alcohol use: Never   • Drug use: Never   • Sexual activity: Yes     Partners: Male     Birth control/protection: Hysterectomy     Comment: complete hysterectomy         CURRENT MEDICATIONS    Current Outpatient Medications:   •  alendronate (FOSAMAX) 70 MG tablet, Take 1 tablet by mouth Every 7 (Seven) Days., Disp: , Rfl:   •  azaTHIOprine (IMURAN) 50 MG tablet, TAKE FOUR AND ONE-HALF TABLETS BY MOUTH ONCE DAILY, Disp: 405 tablet, Rfl: 3  •  B Complex Vitamins (VITAMIN B COMPLEX PO), Take  by mouth., Disp: , Rfl:   •  BACLOFEN 10 MG/5ML ORAL SUSPENSION, Take 5 mL by mouth., Disp: , Rfl:   •  Biotin 1 MG capsule, 5,000 mcg., Disp: , Rfl:   •  Black Pepper-Turmeric (Turmeric Curcumin) 5-1000 MG capsule, 2 (Two) Times a Day., Disp: , Rfl:   •   "Calcium Carbonate-Vitamin D (CALTRATE 600+D PO), Take  by mouth Daily., Disp: , Rfl:   •  celecoxib (CeleBREX) 200 MG capsule, Take 1 capsule by mouth Daily., Disp: , Rfl:   •  fluticasone (FLONASE) 50 MCG/ACT nasal spray, 2 sprays into the nostril(s) as directed by provider Daily., Disp: , Rfl:   •  hydroCHLOROthiazide (HYDRODIURIL) 25 MG tablet, Take 1 tablet by mouth Daily., Disp: , Rfl:   •  latanoprost (XALATAN) 0.005 % ophthalmic solution, , Disp: , Rfl:   •  levocetirizine (XYZAL) 5 MG tablet, Take 1 tablet by mouth Every Morning., Disp: , Rfl:   •  levothyroxine (SYNTHROID, LEVOTHROID) 50 MCG tablet, Take 1 tablet by mouth Daily., Disp: , Rfl:   •  lisinopril-hydrochlorothiazide (PRINZIDE,ZESTORETIC) 20-12.5 MG per tablet, Take 1 tablet by mouth 2 (Two) Times a Day Before Meals., Disp: , Rfl:   •  magnesium, as, gluconate (MAGONATE) 500 (27 Mg) MG tablet, Take 1 tablet by mouth 2 (Two) Times a Day., Disp: , Rfl:   •  montelukast (SINGULAIR) 10 MG tablet, , Disp: , Rfl:   •  omeprazole (PriLOSEC) 20 MG capsule, Take 1 capsule by mouth Daily., Disp: , Rfl:   •  potassium chloride (K-DUR,KLOR-CON) 20 MEQ tablet controlled-release ER tablet, Take 1 tablet by mouth Daily., Disp: , Rfl:   •  pravastatin (PRAVACHOL) 80 MG tablet, Take 1 tablet by mouth Daily., Disp: , Rfl:   •  sertraline (ZOLOFT) 50 MG tablet, Take 1 tablet by mouth every night at bedtime., Disp: , Rfl:   •  vitamin C (ASCORBIC ACID) 250 MG tablet, Take 1 tablet by mouth Daily., Disp: , Rfl:     ALLERGIES  Codeine and Penicillins    VITALS  Vitals:    12/21/23 1525   BP: 122/82   BP Location: Left arm   Patient Position: Sitting   Cuff Size: Large Adult   Weight: (!) 172 kg (380 lb)   Height: 157.5 cm (62.01\")       PHYSICAL EXAM  Debilities/Disabilities Identified: None  Emotional Behavior: Appropriate  Wt Readings from Last 3 Encounters:   12/21/23 (!) 172 kg (380 lb)   10/23/23 (!) 172 kg (380 lb)   06/22/23 (!) 188 kg (415 lb)     Ht Readings " "from Last 1 Encounters:   12/21/23 157.5 cm (62.01\")     Body mass index is 69.48 kg/m².  Physical Exam  Constitutional:       General: She is not in acute distress.     Appearance: Normal appearance. She is not ill-appearing.   HENT:      Head: Normocephalic and atraumatic.      Mouth/Throat:      Mouth: Mucous membranes are moist.      Pharynx: No posterior oropharyngeal erythema.   Eyes:      General: No scleral icterus.  Cardiovascular:      Rate and Rhythm: Normal rate and regular rhythm.      Heart sounds: Normal heart sounds.   Pulmonary:      Effort: Pulmonary effort is normal.      Breath sounds: Normal breath sounds.   Abdominal:      General: Abdomen is flat. Bowel sounds are normal. There is no distension.      Palpations: Abdomen is soft. There is no mass.      Tenderness: There is no abdominal tenderness. There is no guarding or rebound. Negative signs include Pardo's sign.      Hernia: No hernia is present.   Musculoskeletal:      Cervical back: Neck supple.   Skin:     General: Skin is warm.      Capillary Refill: Capillary refill takes less than 2 seconds.   Neurological:      General: No focal deficit present.      Mental Status: She is alert and oriented to person, place, and time.   Psychiatric:         Mood and Affect: Mood normal.         Behavior: Behavior normal.         Thought Content: Thought content normal.         Judgment: Judgment normal.       CLINICAL DATA REVIEWED   reviewed previous lab results and integrated with today's visit, reviewed notes from other physicians and/or last GI encounter, reviewed previous endoscopy results and available photos, reviewed surgical pathology results from previous biopsies    ASSESSMENT  Diagnoses and all orders for this visit:    Crohn's disease of ileum without complication  -     Cancel: CBC & Differential  -     Cancel: Comprehensive Metabolic Panel  -     CBC & Differential  -     Prometheus Monitr Crohn's Disease; Future    Hyperlipidemia, " unspecified hyperlipidemia type  -     Prometheus Monitr Crohn's Disease; Future          PLAN    CBC, Crohns monitr    No follow-ups on file.    I have discussed the above plan with the patient.  They verbalize understanding and are in agreement with the plan.  They have been advised to contact the office for any questions, concerns, or changes related to their health.

## 2024-01-01 LAB — REF LAB TEST METHOD: NORMAL

## 2024-01-08 ENCOUNTER — TELEPHONE (OUTPATIENT)
Dept: GASTROENTEROLOGY | Facility: CLINIC | Age: 69
End: 2024-01-08
Payer: MEDICARE

## 2024-01-08 NOTE — TELEPHONE ENCOUNTER
Pt stated she Called [facility] to request a continuity of care. Was informed that she has out of network coverage. She was told Judith called office spoke with staff informed approval. Provider would need to bill judith. Pt was unable to get person's name.

## 2024-01-09 ENCOUNTER — LAB (OUTPATIENT)
Dept: LAB | Facility: HOSPITAL | Age: 69
End: 2024-01-09
Payer: MEDICARE

## 2024-01-09 ENCOUNTER — TELEPHONE (OUTPATIENT)
Dept: GASTROENTEROLOGY | Facility: CLINIC | Age: 69
End: 2024-01-09
Payer: MEDICARE

## 2024-01-09 DIAGNOSIS — Z11.59 ENCOUNTER FOR SCREENING FOR OTHER VIRAL DISEASES: ICD-10-CM

## 2024-01-09 DIAGNOSIS — K50.00 CROHN'S DISEASE OF ILEUM WITHOUT COMPLICATION: Primary | ICD-10-CM

## 2024-01-09 DIAGNOSIS — K50.00 CROHN'S DISEASE OF ILEUM WITHOUT COMPLICATION: ICD-10-CM

## 2024-01-09 PROCEDURE — 86704 HEP B CORE ANTIBODY TOTAL: CPT

## 2024-01-09 PROCEDURE — 36415 COLL VENOUS BLD VENIPUNCTURE: CPT

## 2024-01-09 PROCEDURE — 86480 TB TEST CELL IMMUN MEASURE: CPT

## 2024-01-09 NOTE — TELEPHONE ENCOUNTER
APRN recommended Humira/biosimular  Current PA out of Humira  Pt aware see- result note from recent lab

## 2024-01-10 LAB — HBV CORE AB SERPL QL IA: NEGATIVE

## 2024-01-12 LAB
GAMMA INTERFERON BACKGROUND BLD IA-ACNC: 0 IU/ML
M TB IFN-G BLD-IMP: NEGATIVE
M TB IFN-G CD4+ T-CELLS BLD-ACNC: 0 IU/ML
M TBIFN-G CD4+ CD8+T-CELLS BLD-ACNC: 0 IU/ML
MITOGEN IGNF BLD-ACNC: 3.2 IU/ML
QUANTIFERON INCUBATION: NORMAL
SERVICE CMNT-IMP: NORMAL

## 2024-01-17 DIAGNOSIS — K50.00 CROHN'S DISEASE OF SMALL INTESTINE WITHOUT COMPLICATION: Primary | ICD-10-CM

## 2024-01-17 RX ORDER — ADALIMUMAB-ADAZ 80 MG/.8ML
INJECTION, SOLUTION SUBCUTANEOUS
Qty: 2.4 ML | Refills: 0 | Status: SHIPPED | OUTPATIENT
Start: 2024-01-17 | End: 2024-01-18

## 2024-01-17 RX ORDER — ADALIMUMAB-ADAZ 40 MG/.4ML
1 INJECTION, SOLUTION SUBCUTANEOUS
Qty: 0.8 ML | Refills: 11 | Status: SHIPPED | OUTPATIENT
Start: 2024-01-17

## 2024-02-05 ENCOUNTER — TELEPHONE (OUTPATIENT)
Dept: GASTROENTEROLOGY | Facility: CLINIC | Age: 69
End: 2024-02-05
Payer: MEDICARE

## 2024-02-05 NOTE — TELEPHONE ENCOUNTER
Humira Start  [facility]  Need complete enrollment and abbvie assist    Will need to call pt with planned steps

## 2024-02-06 DIAGNOSIS — K50.00 CROHN'S DISEASE OF SMALL INTESTINE WITHOUT COMPLICATION: Primary | ICD-10-CM

## 2024-02-06 RX ORDER — ADALIMUMAB 40MG/0.4ML
KIT SUBCUTANEOUS
Qty: 2 EACH | Refills: 11 | Status: SHIPPED | OUTPATIENT
Start: 2024-02-06

## 2024-02-06 RX ORDER — ADALIMUMAB 80MG/0.8ML
KIT SUBCUTANEOUS
Qty: 3 EACH | Refills: 0 | Status: SHIPPED | OUTPATIENT
Start: 2024-02-06

## 2024-04-29 DIAGNOSIS — K50.00 CROHN'S DISEASE OF SMALL INTESTINE WITHOUT COMPLICATION: Primary | ICD-10-CM

## 2024-04-29 RX ORDER — DIPHENHYDRAMINE HYDROCHLORIDE 50 MG/ML
50 INJECTION INTRAMUSCULAR; INTRAVENOUS ONCE
OUTPATIENT
Start: 2024-05-06 | End: 2024-05-06

## 2024-04-29 RX ORDER — DIPHENHYDRAMINE HCL 25 MG
50 CAPSULE ORAL ONCE
OUTPATIENT
Start: 2024-05-06 | End: 2024-05-06

## 2024-05-06 ENCOUNTER — TELEPHONE (OUTPATIENT)
Dept: GASTROENTEROLOGY | Facility: CLINIC | Age: 69
End: 2024-05-06
Payer: MEDICARE

## 2024-05-23 ENCOUNTER — HOSPITAL ENCOUNTER (OUTPATIENT)
Dept: INFUSION THERAPY | Facility: HOSPITAL | Age: 69
Discharge: HOME | End: 2024-05-23
Payer: MEDICARE

## 2024-05-23 VITALS
OXYGEN SATURATION: 95 % | RESPIRATION RATE: 18 BRPM | SYSTOLIC BLOOD PRESSURE: 121 MMHG | DIASTOLIC BLOOD PRESSURE: 71 MMHG | TEMPERATURE: 97.4 F | HEART RATE: 68 BPM

## 2024-05-23 DIAGNOSIS — K50.00 CROHN'S DISEASE OF SMALL INTESTINE WITHOUT COMPLICATION: Primary | ICD-10-CM

## 2024-05-23 LAB
ALBUMIN SERPL-MCNC: 4 G/DL (ref 3.5–5.2)
ALBUMIN/GLOB SERPL: 1.3 G/DL
ALP SERPL-CCNC: 69 U/L (ref 39–117)
ALT SERPL W P-5'-P-CCNC: 21 U/L (ref 1–33)
ANION GAP SERPL CALCULATED.3IONS-SCNC: 10.8 MMOL/L (ref 5–15)
AST SERPL-CCNC: 23 U/L (ref 1–32)
BILIRUB SERPL-MCNC: 0.5 MG/DL (ref 0–1.2)
BUN SERPL-MCNC: 26 MG/DL (ref 8–23)
BUN/CREAT SERPL: 31.3 (ref 7–25)
CALCIUM SPEC-SCNC: 9.4 MG/DL (ref 8.6–10.5)
CHLORIDE SERPL-SCNC: 100 MMOL/L (ref 98–107)
CO2 SERPL-SCNC: 28.2 MMOL/L (ref 22–29)
CREAT SERPL-MCNC: 0.83 MG/DL (ref 0.57–1)
EGFRCR SERPLBLD CKD-EPI 2021: 76.4 ML/MIN/1.73
GLOBULIN UR ELPH-MCNC: 3 GM/DL
GLUCOSE SERPL-MCNC: 102 MG/DL (ref 65–99)
POTASSIUM SERPL-SCNC: 4.1 MMOL/L (ref 3.5–5.2)
PROT SERPL-MCNC: 7 G/DL (ref 6–8.5)
SODIUM SERPL-SCNC: 139 MMOL/L (ref 136–145)

## 2024-05-23 PROCEDURE — 25810000003 SODIUM CHLORIDE 0.9 % SOLUTION 250 ML FLEX CONT

## 2024-05-23 PROCEDURE — 96365 THER/PROPH/DIAG IV INF INIT: CPT

## 2024-05-23 PROCEDURE — 25010000002 RISANKIZUMAB-RZAA 600 MG/10ML SOLUTION 10 ML VIAL

## 2024-05-23 PROCEDURE — 80053 COMPREHEN METABOLIC PANEL: CPT

## 2024-05-23 RX ORDER — DIPHENHYDRAMINE HCL 25 MG
50 CAPSULE ORAL ONCE
OUTPATIENT
Start: 2024-06-20 | End: 2024-06-20

## 2024-05-23 RX ORDER — DIPHENHYDRAMINE HYDROCHLORIDE 50 MG/ML
50 INJECTION INTRAMUSCULAR; INTRAVENOUS ONCE
OUTPATIENT
Start: 2024-06-20 | End: 2024-06-20

## 2024-05-23 RX ADMIN — SODIUM CHLORIDE 600 MG: 9 INJECTION, SOLUTION INTRAVENOUS at 14:49

## 2024-05-23 NOTE — NURSING NOTE
Pt arrived to [facility] for appt. VSS, no complaints at this time. Medication administered per MD order. Pt tolerated well. VSS post infusion. Scheduled next appt. AVS printed out, copy given to pt. Pt discharged from [facility] at 16:30 PM in stable condition, without complaints.

## 2024-06-13 DIAGNOSIS — K50.00 CROHN'S DISEASE OF ILEUM WITHOUT COMPLICATION: ICD-10-CM

## 2024-06-14 RX ORDER — AZATHIOPRINE 50 MG/1
TABLET ORAL
Qty: 405 TABLET | Refills: 3 | Status: SHIPPED | OUTPATIENT
Start: 2024-06-14

## 2024-06-20 ENCOUNTER — HOSPITAL ENCOUNTER (OUTPATIENT)
Dept: INFUSION THERAPY | Facility: HOSPITAL | Age: 69
Discharge: HOME | End: 2024-06-20
Payer: MEDICARE

## 2024-06-20 VITALS
DIASTOLIC BLOOD PRESSURE: 65 MMHG | OXYGEN SATURATION: 97 % | SYSTOLIC BLOOD PRESSURE: 136 MMHG | HEART RATE: 59 BPM | RESPIRATION RATE: 16 BRPM

## 2024-06-20 DIAGNOSIS — K50.00 CROHN'S DISEASE OF SMALL INTESTINE WITHOUT COMPLICATION: Primary | ICD-10-CM

## 2024-06-20 LAB
ALBUMIN SERPL-MCNC: 4 G/DL (ref 3.5–5.2)
ALBUMIN/GLOB SERPL: 1.4 G/DL
ALP SERPL-CCNC: 64 U/L (ref 39–117)
ALT SERPL W P-5'-P-CCNC: 16 U/L (ref 1–33)
ANION GAP SERPL CALCULATED.3IONS-SCNC: 11 MMOL/L (ref 5–15)
AST SERPL-CCNC: 21 U/L (ref 1–32)
BILIRUB SERPL-MCNC: 0.7 MG/DL (ref 0–1.2)
BUN SERPL-MCNC: 23 MG/DL (ref 8–23)
BUN/CREAT SERPL: 26.1 (ref 7–25)
CALCIUM SPEC-SCNC: 9.3 MG/DL (ref 8.6–10.5)
CHLORIDE SERPL-SCNC: 101 MMOL/L (ref 98–107)
CO2 SERPL-SCNC: 26 MMOL/L (ref 22–29)
CREAT SERPL-MCNC: 0.88 MG/DL (ref 0.57–1)
EGFRCR SERPLBLD CKD-EPI 2021: 71.2 ML/MIN/1.73
GLOBULIN UR ELPH-MCNC: 2.8 GM/DL
GLUCOSE SERPL-MCNC: 92 MG/DL (ref 65–99)
POTASSIUM SERPL-SCNC: 4.2 MMOL/L (ref 3.5–5.2)
PROT SERPL-MCNC: 6.8 G/DL (ref 6–8.5)
SODIUM SERPL-SCNC: 138 MMOL/L (ref 136–145)

## 2024-06-20 PROCEDURE — 25010000002 RISANKIZUMAB-RZAA 600 MG/10ML SOLUTION 10 ML VIAL

## 2024-06-20 PROCEDURE — 80053 COMPREHEN METABOLIC PANEL: CPT

## 2024-06-20 PROCEDURE — 96365 THER/PROPH/DIAG IV INF INIT: CPT

## 2024-06-20 PROCEDURE — 36415 COLL VENOUS BLD VENIPUNCTURE: CPT

## 2024-06-20 PROCEDURE — 25810000003 SODIUM CHLORIDE 0.9 % SOLUTION 250 ML FLEX CONT

## 2024-06-20 RX ORDER — DIPHENHYDRAMINE HYDROCHLORIDE 50 MG/ML
50 INJECTION INTRAMUSCULAR; INTRAVENOUS ONCE
OUTPATIENT
Start: 2024-07-18 | End: 2024-07-18

## 2024-06-20 RX ORDER — DIPHENHYDRAMINE HCL 25 MG
50 CAPSULE ORAL ONCE
OUTPATIENT
Start: 2024-07-18 | End: 2024-07-18

## 2024-06-20 RX ADMIN — SODIUM CHLORIDE 600 MG: 9 INJECTION, SOLUTION INTRAVENOUS at 14:24

## 2024-06-20 NOTE — NURSING NOTE
Patient arrived to [facility] at 1404. History and medications reviewed. Labs drawn from IV access. Medication given as ordered. Patient tolerated well without complications. AVS refused. Patient discharged at 1547 via wheelchair with spouse. Patient left in stable condition without complaints.

## 2024-07-01 DIAGNOSIS — K50.00 CROHN'S DISEASE OF SMALL INTESTINE WITHOUT COMPLICATION: Primary | ICD-10-CM

## 2024-07-01 NOTE — TELEPHONE ENCOUNTER
Pt called wanting to confirm speciality pharmacy will need sent RX for Skyrizi auto-injector due August 18th    Stated that currently cost of induction 25.00 per infusion    Her pharmacy is [facility]     Sent to APRN for approval

## 2024-07-02 RX ORDER — RISANKIZUMAB-RZAA 360 MG/2.4
360 WEARABLE INJECTOR SUBCUTANEOUS
Qty: 2.4 ML | Refills: 6 | Status: SHIPPED | OUTPATIENT
Start: 2024-07-02

## 2024-07-03 ENCOUNTER — TELEPHONE (OUTPATIENT)
Dept: GASTROENTEROLOGY | Facility: CLINIC | Age: 69
End: 2024-07-03
Payer: MEDICARE

## 2024-07-03 NOTE — TELEPHONE ENCOUNTER
Pt called stated jennifer stated injectors are 110.00    Need to call verify: per [employer] benefits cost is 55.00

## 2024-07-05 NOTE — TELEPHONE ENCOUNTER
Called [facility] pharmacy: 110.00 for 56 day   Will need to discuss further with [facility] for pt with update:     Will need to contact [facility] to further investigate

## 2024-07-18 ENCOUNTER — HOSPITAL ENCOUNTER (OUTPATIENT)
Dept: INFUSION THERAPY | Facility: HOSPITAL | Age: 69
Discharge: HOME | End: 2024-07-18
Payer: MEDICARE

## 2024-07-18 VITALS
OXYGEN SATURATION: 96 % | HEART RATE: 60 BPM | RESPIRATION RATE: 16 BRPM | TEMPERATURE: 97 F | SYSTOLIC BLOOD PRESSURE: 144 MMHG | DIASTOLIC BLOOD PRESSURE: 68 MMHG

## 2024-07-18 DIAGNOSIS — K50.00 CROHN'S DISEASE OF SMALL INTESTINE WITHOUT COMPLICATION: Primary | ICD-10-CM

## 2024-07-18 LAB
ALBUMIN SERPL-MCNC: 4.1 G/DL (ref 3.5–5.2)
ALBUMIN/GLOB SERPL: 1.4 G/DL
ALP SERPL-CCNC: 72 U/L (ref 39–117)
ALT SERPL W P-5'-P-CCNC: 16 U/L (ref 1–33)
ANION GAP SERPL CALCULATED.3IONS-SCNC: 10 MMOL/L (ref 5–15)
AST SERPL-CCNC: 25 U/L (ref 1–32)
BILIRUB SERPL-MCNC: 0.6 MG/DL (ref 0–1.2)
BUN SERPL-MCNC: 23 MG/DL (ref 8–23)
BUN/CREAT SERPL: 25 (ref 7–25)
CALCIUM SPEC-SCNC: 9.6 MG/DL (ref 8.6–10.5)
CHLORIDE SERPL-SCNC: 99 MMOL/L (ref 98–107)
CO2 SERPL-SCNC: 27 MMOL/L (ref 22–29)
CREAT SERPL-MCNC: 0.92 MG/DL (ref 0.57–1)
EGFRCR SERPLBLD CKD-EPI 2021: 67.5 ML/MIN/1.73
GLOBULIN UR ELPH-MCNC: 3 GM/DL
GLUCOSE SERPL-MCNC: 98 MG/DL (ref 65–99)
POTASSIUM SERPL-SCNC: 4.2 MMOL/L (ref 3.5–5.2)
PROT SERPL-MCNC: 7.1 G/DL (ref 6–8.5)
SODIUM SERPL-SCNC: 136 MMOL/L (ref 136–145)

## 2024-07-18 PROCEDURE — 36415 COLL VENOUS BLD VENIPUNCTURE: CPT

## 2024-07-18 PROCEDURE — 25010000002 RISANKIZUMAB-RZAA 600 MG/10ML SOLUTION 10 ML VIAL

## 2024-07-18 PROCEDURE — 96365 THER/PROPH/DIAG IV INF INIT: CPT

## 2024-07-18 PROCEDURE — 25810000003 SODIUM CHLORIDE 0.9 % SOLUTION 250 ML FLEX CONT

## 2024-07-18 PROCEDURE — 80053 COMPREHEN METABOLIC PANEL: CPT

## 2024-07-18 RX ORDER — DIPHENHYDRAMINE HYDROCHLORIDE 50 MG/ML
50 INJECTION INTRAMUSCULAR; INTRAVENOUS ONCE
OUTPATIENT
Start: 2024-07-18 | End: 2024-07-18

## 2024-07-18 RX ORDER — DIPHENHYDRAMINE HCL 25 MG
50 CAPSULE ORAL ONCE
OUTPATIENT
Start: 2024-07-18 | End: 2024-07-18

## 2024-07-18 RX ADMIN — SODIUM CHLORIDE 600 MG: 9 INJECTION, SOLUTION INTRAVENOUS at 14:37

## 2024-07-18 NOTE — PATIENT INSTRUCTIONS
"  Call Dr. Christiano Cazares, Gastroenterology at [phone] if you have any problems or concerns.    We know you have a Choice in healthcare and appreciate you using [facility].  Our purpose is to provide you \"Excellent Care\".  We hope that you will always choose us in the future and continue to recommend us to your family and friends.                "

## 2024-07-18 NOTE — NURSING NOTE
PT ARRIVED TO [facility] FOR APPT. VSS, NO COMPLAINTS AT THIS TIME. LAB DRAWN VIA VENIPUNCTURE. MEDICATION ADMINISTERED PER MD ORDER. PT TOLERATED WELL. VSS POST INFUSION. AVS PRINTED OUT, COPY GIVEN TO PT. PT DISCHARGED FROM [facility] AT 16:04 PM IN STABLE CONDITION, WITHOUT COMPLAINTS.

## 2024-07-31 ENCOUNTER — OFFICE VISIT (OUTPATIENT)
Dept: GASTROENTEROLOGY | Facility: CLINIC | Age: 69
End: 2024-07-31
Payer: MEDICARE

## 2024-07-31 ENCOUNTER — TELEPHONE (OUTPATIENT)
Dept: GASTROENTEROLOGY | Facility: CLINIC | Age: 69
End: 2024-07-31

## 2024-07-31 VITALS — SYSTOLIC BLOOD PRESSURE: 128 MMHG | DIASTOLIC BLOOD PRESSURE: 78 MMHG | WEIGHT: 293 LBS | BODY MASS INDEX: 63.82 KG/M2

## 2024-07-31 DIAGNOSIS — K50.00 CROHN'S DISEASE OF SMALL INTESTINE WITHOUT COMPLICATION: Primary | ICD-10-CM

## 2024-07-31 DIAGNOSIS — K21.00 GASTROESOPHAGEAL REFLUX DISEASE WITH ESOPHAGITIS WITHOUT HEMORRHAGE: ICD-10-CM

## 2024-07-31 NOTE — PROGRESS NOTES
PATIENT INFORMATION  Susanne Harper       - 1955    CHIEF COMPLAINT  Chief Complaint   Patient presents with    Crohn's Disease       HISTORY OF PRESENT ILLNESS    Here today for Crohns follow-up     Crohns ileitis diagnosed . Previously maintained on crohns, however 2 NMSC removed in a yr. Crohns monitr 41. Switched to skyrizi, up to date on biologic labs.     Per LOV: Bowels were 1-2 a day with no abdominal pain. Easy to pass. Abd pain is rare, less than weekly.  TODAY: Feeling ok, just finished induction series. Occasional mid abdominal pain once a month, pushes down and breaths and goes away.     Considering GLP-1 agent and reviewed GP diet and that it will not interfere with skyrizi.     3/29/2023 last colon incomplete due to looping and adhesions. Normal barium enema.     Last Dexa 5/3/2018 with osteopenia hip.    GERD 20 mg omeprazole BID. Well controlled.      Crohn's Disease  Associated symptoms include abdominal pain and fatigue. Pertinent negatives include no nausea or vomiting.       REVIEWED PERTINENT RESULTS/ LABS  Lab Results   Component Value Date    CASEREPORT  2020     Surgical Pathology Report                         Case: [id number]                                  Authorizing Provider:  Shantal Greenwood MD         Collected:           2020 09:04 AM          Ordering Location:     [facility]   Received:            2020 10:28 AM                                 OR                                                                           Pathologist:           Ashok Garcia MD                                                         Specimens:   1) - Small Intestine, terminal ileum biopsy                                                         2) - Large Intestine, Right / Ascending Colon, right colon biopsy                                   3) - Large Intestine, Transverse Colon, transverse colon  biopsy                                    4) - Large  Intestine, Left / Descending Colon, left colon biopsy                                    5) - Large Intestine, Rectum, rectal polyp x5                                                       6) - Large Intestine, Rectum, randum rectal biopsies                                       FINALDX  01/08/2020     1. Terminal Ileum, Biopsy: Benign small bowel mucosa with   A. Normal intact villous surface.   B. No significant inflammation, no granulomas.   C. No viral inclusions or other organisms on routinely stained sections.    2. Colon, Right, Biopsy: Benign colonic mucosa with   A. No hyperplastic or tubulovillous change.   B. No significant inflammation.   C. No crypt distortion or basement membrane thickening.   D. No viral inclusions or other organisms on routinely stained sections.    3. Colon, Transverse, Biopsy: Benign colonic mucosa with   A. No hyperplastic or tubulovillous change.   B. No significant inflammation.   C. No crypt distortion or basement membrane thickening.   D. No viral inclusions or other organisms on routinely stained sections.    4. Colon, Left, Biopsy: Benign colonic mucosa with   A. No hyperplastic or tubulovillous change.   B. No significant inflammation.   C. No crypt distortion or basement membrane thickening.   D. No viral inclusions or other organisms on routinely stained sections.    5. Rectum, Biopsy:    A. Hyperplastic polyps.    6. Rectum, Random, Biopsy: Benign colonic mucosa with   A. A single fragment suggestive of developing hyperplastic polyp.    jalil/pkm           Lab Results   Component Value Date    HGB 13.5 12/21/2023    MCV 94.8 12/21/2023     12/21/2023    ALT 16 07/18/2024    AST 25 07/18/2024    HGBA1C 5.80 (H) 04/12/2020    INR 1.04 04/13/2020    TRIG 126 07/08/2024      No results found.    REVIEW OF SYSTEMS  Review of Systems   Constitutional:  Positive for fatigue.   HENT: Negative.     Eyes: Negative.    Respiratory:  Positive for shortness of breath.     Cardiovascular: Negative.    Gastrointestinal:  Positive for abdominal pain. Negative for constipation, diarrhea, nausea and vomiting.        Crohn's    Endocrine: Negative.    Genitourinary: Negative.    Musculoskeletal:  Positive for back pain.   Skin: Negative.    Allergic/Immunologic: Negative.    Neurological:  Positive for dizziness (Vertigo).   Hematological: Negative.    Psychiatric/Behavioral: Negative.           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]     History of vaccination [Z92.29]     Body mass index (BMI) 45.0-49.9, adult [Z68.42]     Benign essential hypertension [I10]     Wheelchair dependence [Z99.3]     COVID-19 [U07.1]     Vitamin D deficiency [E55.9]     Hip pain [M25.559]     Partial obstruction of small intestine [K56.600]     Ventral hernia [K43.9]     Fracture of patella [S82.009A]     Edema of foot [R60.0]     Palpitations [R00.2]     Degenerative joint disease involving multiple joints [M15.9]     Knee pain [M25.569]     Hyperglycemia [R73.9]     Need for influenza vaccination [Z23]     Osteopenia [M85.80]     Postmenopausal state [Z78.0]     Vertigo [R42]     Encounter for screening mammogram for malignant neoplasm of breast [Z12.31]     Crohn's disease [K50.90]     Hyperlipidemia [E78.5]     Obstructive sleep apnea of adult [G47.33]     Hypothyroidism [E03.9]     History of gastroesophageal reflux (GERD) [Z87.19]     Glaucoma (increased eye pressure) [H40.9]     Depressive disorder [F32.A]     Arthritis [M19.90]     Allergic rhinitis [J30.9]     Shoulder joint pain [M25.519]     Sleep apnea [G47.30]     Incisional hernia [K43.2]     Gastroesophageal reflux disease [K21.9]     Hypertensive disorder [I10]          PAST MEDICAL  HISTORY  Past Medical History:   Diagnosis Date    Allergic rhinitis     Anal lesion 06/2023    Anxiety     Aortic valve sclerosis 07/2021    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 CINDY ESCOBAR    COVID-19 09/15/2021    Crohn's disease     DDD (degenerative disc disease), lumbar     DDD (degenerative disc disease), thoracic     Degenerative joint disease involving multiple joints 03/06/2019    Depression     Generalized edema 06/2023    GERD (gastroesophageal reflux disease)     Glaucoma (increased eye pressure) 03/23/2017    Hernia 2015/2016    History of acute renal failure     after surgery    History of transfusion     Hyperglycemia 02/28/2019    Hyperlipidemia     Hypersomatotropism 07/2018    Hypertension     Hypothyroidism     Lumbar radiculopathy     Obstructive sleep apnea     Osteoarthritis     Osteopenia     Osteoporosis     Palpitations 03/06/2019    Partial obstruction of small intestine 04/20/2020    Partial small bowel obstruction 04/12/2020    ADMITTED TO [facility]    Peripheral edema 08/2021    PLMD (periodic limb movement disorder)     Rectal bleeding ?    Respiratory failure, post-operative     on vent x3 days 12/2015    Vertigo     Vitamin D deficiency 09/24/2020    Wheelchair dependence 06/23/2022         SURGICAL HISTORY  Past Surgical History:   Procedure Laterality Date    ABDOMINAL SURGERY  2015/2016    APPENDECTOMY  Same time hysterectomoy    COLONOSCOPY N/A 11/14/2014    MILD SIGMOID DIVERTICULOSIS, HEMORRHOIDS, RANDOM COLON BIOPSIES BENIGN, DR. LYNDSAY LARRY AT [facility]    COLONOSCOPY N/A 01/08/2020    5 BENIGN RECTAL POLYPS, DIVERTICULOSIS ON LEFT SIDE, RESCOPE IN 5 YRS, DR. LYNDSAY LARRY AT  [facility]    COLONOSCOPY N/A 03/29/2023    EXCESSIVE LOOPING AND ADHESIONS, DR. CINDY ESCOBAR AT [facility]    COLONOSCOPY N/A 2007     EXPLORATORY LAPAROTOMY N/A 12/21/2015    AND PARTIAL OMENECTOMY, DR. ABE CASTILLO AT [facility]    GANGLION CYST EXCISION Right 1983    HYSTERECTOMY N/A 2007    SINUS ENDOSCOPY Bilateral 07/05/2023    (+) CARCINOMA, DR. LUPILLO ZIEGLER AT  [facility]     TONSILLECTOMY AND ADENOIDECTOMY Bilateral     UMBILICAL HERNIA REPAIR N/A 12/18/2015    LAPAROSCOPIC, DR. ABE CASTILLO AT [facility]    UPPER GASTROINTESTINAL ENDOSCOPY  ???    VENTRAL/INCISIONAL HERNIA REPAIR N/A 11/30/2016    Procedure: VENTRAL/INCISIONAL HERNIA REPAIR;  Surgeon: Sumit Logan MD;  Location: [facility] OR;  Service:          FAMILY HISTORY  Family History   Problem Relation Age of Onset    Arthritis Mother     Congenital heart disease Sister     Diabetes Maternal Grandmother     Glaucoma Maternal Grandmother     Heart disease Maternal Grandfather     Cancer Maternal Grandfather     Heart disease Paternal Grandfather     Breast cancer Neg Hx     Colon cancer Neg Hx     Colon polyps Neg Hx          SOCIAL HISTORY  Social History     Occupational History    Not on file   Tobacco Use    Smoking status: Never     Passive exposure: Never    Smokeless tobacco: Never    Tobacco comments:     N/A   Vaping Use    Vaping status: Never Used   Substance and Sexual Activity    Alcohol use: Never    Drug use: Never    Sexual activity: Yes     Partners: Male     Birth control/protection: Hysterectomy     Comment: complete hysterectomy         CURRENT MEDICATIONS    Current Outpatient Medications:     B Complex Vitamins (VITAMIN B COMPLEX PO), Take  by mouth., Disp: , Rfl:     BACLOFEN 10 MG/5ML ORAL SUSPENSION, Take 5 mL by mouth., Disp: , Rfl:     Biotin 1 MG capsule, 5,000 mcg., Disp: , Rfl:     Black Pepper-Turmeric (Turmeric Curcumin) 5-1000 MG capsule, 2 (Two) Times a Day., Disp: , Rfl:     Calcium Carbonate-Vitamin D (CALTRATE 600+D PO), Take  by mouth Daily., Disp: , Rfl:     celecoxib (CeleBREX) 200 MG capsule, Take 1 capsule by mouth Daily., Disp: , Rfl:     fluticasone  "(FLONASE) 50 MCG/ACT nasal spray, 2 sprays into the nostril(s) as directed by provider Daily., Disp: , Rfl:     hydroCHLOROthiazide (HYDRODIURIL) 25 MG tablet, Take 1 tablet by mouth Daily., Disp: , Rfl:     latanoprost (XALATAN) 0.005 % ophthalmic solution, , Disp: , Rfl:     levocetirizine (XYZAL) 5 MG tablet, Take 1 tablet by mouth Every Evening., Disp: , Rfl:     levothyroxine (SYNTHROID, LEVOTHROID) 50 MCG tablet, Take 1 tablet by mouth Daily., Disp: , Rfl:     lisinopril-hydrochlorothiazide (PRINZIDE,ZESTORETIC) 20-12.5 MG per tablet, Take 1 tablet by mouth 2 (Two) Times a Day Before Meals., Disp: , Rfl:     magnesium, as, gluconate (MAGONATE) 500 (27 Mg) MG tablet, Take 1 tablet by mouth 2 (Two) Times a Day., Disp: , Rfl:     montelukast (SINGULAIR) 10 MG tablet, , Disp: , Rfl:     omeprazole (PriLOSEC) 20 MG capsule, Take 1 capsule by mouth Daily., Disp: , Rfl:     potassium chloride (K-DUR,KLOR-CON) 20 MEQ tablet controlled-release ER tablet, Take 1 tablet by mouth Daily., Disp: , Rfl:     pravastatin (PRAVACHOL) 80 MG tablet, Take 1 tablet by mouth Daily., Disp: , Rfl:     Risankizumab-rzaa (Skyrizi) 360 MG/2.4ML solution cartridge, Inject 360 mg under the skin into the appropriate area as directed Every 8 (Eight) Weeks., Disp: 2.4 mL, Rfl: 6    sertraline (ZOLOFT) 50 MG tablet, Take 1 tablet by mouth every night at bedtime., Disp: , Rfl:     vitamin C (ASCORBIC ACID) 250 MG tablet, Take 1 tablet by mouth Daily., Disp: , Rfl:     ALLERGIES  Codeine and Penicillins    VITALS  Vitals:    07/31/24 0805   BP: 128/78   BP Location: Left arm   Patient Position: Sitting   Cuff Size: Large Adult   Weight: (!) 158 kg (349 lb)       PHYSICAL EXAM  Debilities/Disabilities Identified: None  Emotional Behavior: Appropriate  Wt Readings from Last 3 Encounters:   07/31/24 (!) 158 kg (349 lb)   12/21/23 (!) 172 kg (380 lb)   10/23/23 (!) 172 kg (380 lb)     Ht Readings from Last 1 Encounters:   12/21/23 157.5 cm (62.01\") "     Body mass index is 63.82 kg/m².  Physical Exam  Constitutional:       General: She is not in acute distress.     Appearance: Normal appearance. She is not ill-appearing.   HENT:      Head: Normocephalic and atraumatic.      Mouth/Throat:      Mouth: Mucous membranes are moist.      Pharynx: No posterior oropharyngeal erythema.   Eyes:      General: No scleral icterus.  Cardiovascular:      Rate and Rhythm: Normal rate and regular rhythm.      Heart sounds: Normal heart sounds.   Pulmonary:      Effort: Pulmonary effort is normal.      Breath sounds: Normal breath sounds.   Abdominal:      General: Abdomen is flat. Bowel sounds are normal. There is no distension.      Palpations: Abdomen is soft. There is no mass.      Tenderness: There is no abdominal tenderness. There is no guarding or rebound. Negative signs include Pardo's sign.      Hernia: No hernia is present.   Musculoskeletal:      Cervical back: Neck supple.   Skin:     General: Skin is warm.      Capillary Refill: Capillary refill takes less than 2 seconds.   Neurological:      General: No focal deficit present.      Mental Status: She is alert and oriented to person, place, and time.   Psychiatric:         Mood and Affect: Mood normal.         Behavior: Behavior normal.         Thought Content: Thought content normal.         Judgment: Judgment normal.       CLINICAL DATA REVIEWED   reviewed previous lab results and integrated with today's visit, reviewed notes from other physicians and/or last GI encounter, reviewed previous endoscopy results and available photos, reviewed surgical pathology results from previous biopsies    ASSESSMENT  Diagnoses and all orders for this visit:    Crohn's disease of small intestine without complication    Gastroesophageal reflux disease with esophagitis without hemorrhage          PLAN    Stop imuran  Continue skyrizi, taught patient to use autoinjector with the demo kit today  Ok to try GLP-1 inhibitor    Return in  about 6 months (around 1/31/2025).    I have discussed the above plan with the patient.  They verbalize understanding and are in agreement with the plan.  They have been advised to contact the office for any questions, concerns, or changes related to their health.

## 2024-09-13 ENCOUNTER — TELEPHONE (OUTPATIENT)
Dept: GASTROENTEROLOGY | Facility: CLINIC | Age: 69
End: 2024-09-13
Payer: MEDICARE

## 2024-09-13 NOTE — TELEPHONE ENCOUNTER
Risankizumab-rzaa (Skyrizi) 360 MG/2.4ML solution cartridge [[id number]] (Order [id number])       PT SAID DANIEL HAD TOLD HER WHEN EVER SHE WAS GOING TO GET A VACCINE SHE SHOULD NOTIFY HER WHEN WOULD BE THE BEST TIME.    SHE HAD SKYRIZI ON 08/15  HER NEXT DOSE IS 10/10    SHE IS GOING TO SEE HER PCP AND WOULD LIKE TO GET THE FLU VACCINE  SHE ALSO WANTED DANIEL'S THOUGHTS ON THE COVID VACCINE..  SHOULD SHE GET IT OR NOT.    SHE WOULD LIKE TO KNOW WHEN WOULD BE THE BEST TIME FOR HER TO SCHEDULE THE INJECTIONS IN THE TIME FRAME OF HER SKYRIZI    OK TO LEAVE DETAILED VM    CALL BACK [phone]

## 2024-10-16 ENCOUNTER — DOCUMENTATION (OUTPATIENT)
Dept: SLEEP MEDICINE | Facility: HOSPITAL | Age: 69
End: 2024-10-16
Payer: MEDICARE

## 2024-10-16 ENCOUNTER — OFFICE VISIT (OUTPATIENT)
Dept: SLEEP MEDICINE | Facility: HOSPITAL | Age: 69
End: 2024-10-16
Payer: MEDICARE

## 2024-10-16 VITALS
WEIGHT: 293 LBS | HEIGHT: 62 IN | BODY MASS INDEX: 53.92 KG/M2 | SYSTOLIC BLOOD PRESSURE: 138 MMHG | OXYGEN SATURATION: 95 % | DIASTOLIC BLOOD PRESSURE: 69 MMHG | HEART RATE: 65 BPM

## 2024-10-16 DIAGNOSIS — E66.9 SUPER OBESE: ICD-10-CM

## 2024-10-16 DIAGNOSIS — G47.33 OBSTRUCTIVE SLEEP APNEA: Primary | ICD-10-CM

## 2024-10-16 DIAGNOSIS — R68.2 DRY MOUTH: ICD-10-CM

## 2024-10-16 PROCEDURE — G0463 HOSPITAL OUTPT CLINIC VISIT: HCPCS

## 2024-10-16 NOTE — PROGRESS NOTES
[facility]  Telephone: [phone] / Fax: [phone] [place]  Telephone: [phone] / Fax: [phone] Lizabeth Wright    PCP: Yolanda Marshall PA    Reason for visit: KELLY f/u    Susanne Harper is a 69 y.o.female  was last seen at [facility] sleep lab in October 2023. She loves her DreamStation 2 machine and monitoring events on the phone dionisio. Event index is <1. She finds the pressures comfortable.  Device set at 10-12cm H2O. There are occasional episodes of snoring despite machine. She uses under the nose mask that fits well. There are no significant leaks.  Her health has been stable. There were no hospitalizations or ER visits in the interval. Her bedtime schedule is 2am-10:30am. ESS is 8.    SH- no tobacco, no alcohol, 2 tea/coffee per day.    ROS- negative.    DME [facility]    Current Medications:    Current Outpatient Medications:     B Complex Vitamins (VITAMIN B COMPLEX PO), Take  by mouth., Disp: , Rfl:     BACLOFEN 10 MG/5ML ORAL SUSPENSION, Take 5 mL by mouth., Disp: , Rfl:     Biotin 1 MG capsule, 5,000 mcg., Disp: , Rfl:     Black Pepper-Turmeric (Turmeric Curcumin) 5-1000 MG capsule, 2 (Two) Times a Day., Disp: , Rfl:     Calcium Carbonate-Vitamin D (CALTRATE 600+D PO), Take  by mouth Daily., Disp: , Rfl:     celecoxib (CeleBREX) 200 MG capsule, Take 1 capsule by mouth Daily., Disp: , Rfl:     fluticasone (FLONASE) 50 MCG/ACT nasal spray, 2 sprays into the nostril(s) as directed by provider Daily., Disp: , Rfl:     hydroCHLOROthiazide (HYDRODIURIL) 25 MG tablet, Take 1 tablet by mouth Daily., Disp: , Rfl:     latanoprost (XALATAN) 0.005 % ophthalmic solution, , Disp: , Rfl:     levocetirizine (XYZAL) 5 MG tablet, Take 1 tablet by mouth Every Evening., Disp: , Rfl:     levothyroxine (SYNTHROID, LEVOTHROID) 50 MCG tablet, Take 1 tablet by mouth Daily., Disp: , Rfl:     lisinopril-hydrochlorothiazide (PRINZIDE,ZESTORETIC) 20-12.5 MG per tablet, Take 1 tablet by mouth 2 (Two) Times a  Day Before Meals., Disp: , Rfl:     magnesium, as, gluconate (MAGONATE) 500 (27 Mg) MG tablet, Take 1 tablet by mouth 2 (Two) Times a Day., Disp: , Rfl:     montelukast (SINGULAIR) 10 MG tablet, , Disp: , Rfl:     omeprazole (PriLOSEC) 20 MG capsule, Take 1 capsule by mouth Daily., Disp: , Rfl:     potassium chloride (K-DUR,KLOR-CON) 20 MEQ tablet controlled-release ER tablet, Take 1 tablet by mouth Daily., Disp: , Rfl:     pravastatin (PRAVACHOL) 80 MG tablet, Take 1 tablet by mouth Daily., Disp: , Rfl:     Risankizumab-rzaa (Skyrizi) 360 MG/2.4ML solution cartridge, Inject 360 mg under the skin into the appropriate area as directed Every 8 (Eight) Weeks., Disp: 2.4 mL, Rfl: 6    sertraline (ZOLOFT) 50 MG tablet, Take 1 tablet by mouth every night at bedtime., Disp: , Rfl:     vitamin C (ASCORBIC ACID) 250 MG tablet, Take 1 tablet by mouth Daily., Disp: , Rfl:    also entered in Sleep Questionnaire    Patient  has a past medical history of Allergic rhinitis, Anal lesion (06/2023), Anxiety, Aortic valve sclerosis (07/2021), 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), Cellulitis of lower leg (12/2022), Closed patellar sleeve fracture of  right knee (09/16/2019), Colon polyps, COVID-19 (09/15/2021), Crohn's disease, DDD (degenerative disc disease), lumbar, DDD (degenerative disc disease), thoracic, Degenerative joint disease involving multiple joints (03/06/2019), Depression, Generalized edema (06/2023), GERD (gastroesophageal reflux disease), Glaucoma (increased eye pressure) (03/23/2017), Hernia (2015/2016), History of acute renal failure, History of transfusion, Hyperglycemia (02/28/2019), Hyperlipidemia, Hypersomatotropism (07/2018), Hypertension, Hypothyroidism, Lumbar radiculopathy, Obstructive sleep apnea, Osteoarthritis, Osteopenia, Osteoporosis, Palpitations (03/06/2019), Partial obstruction of small intestine (04/20/2020), Partial small bowel obstruction (04/12/2020), Peripheral edema (08/2021), PLMD (periodic limb  "movement disorder), Rectal bleeding (?), Respiratory failure, post-operative, Vertigo, Vitamin D deficiency (09/24/2020), and Wheelchair dependence (06/23/2022).    I have reviewed the Past Medical History, Past Surgical History, Social History and Family History.    Vital Signs /69   Pulse 65   Ht 157.5 cm (62\")   Wt (!) 158 kg (349 lb)   LMP  (LMP Unknown)   SpO2 95%   BMI 63.83 kg/m²  Body mass index is 63.83 kg/m².    General Alert and oriented. No acute distress noted, on wheel chair.   Pharynx/Throat Class III  Mallampati airway, large tongue, no evidence of redundant lateral pharyngeal tissue. No oral lesions. No thrush. Moist mucous membranes.   Head Normocephalic. Symmetrical. Atraumatic.    Nose No septal deviation. No drainage   Chest Wall Normal shape. Symmetric expansion with respiration. No tenderness.   Neck Trachea midline, no thyromegaly or adenopathy    Lungs Clear to auscultation bilaterally. No wheezes. No rhonchi. No rales. Respirations regular, even and unlabored.   Heart Regular rhythm and normal rate. Normal S1 and S2. No murmur   Abdomen Soft, non-tender and non-distended. Normal bowel sounds. No masses.   Extremities Moves all extremities well. +lymphedema noted.   Psychiatric Normal mood and affect.     Testing:  Download  8/1/24-10/10/24- 100% use with average nightly use of 7 hours and 56 minutes on auto CPAP 10-12cm H2O, avg pr is 11cm H2O, AHI is 0.4/hr, leak is 3 seconds.    Study-5/22/2017-Results:  Overnight split polysomnogram study that was completed as diagnostic only since sufficient severity of obstructive sleep apnea was not present.  Sleep efficiency was 68.7% with 4.44 hours of total sleep time.  AHI index is 5.2.  Sleep architecture was abnormal with absence of REM sleep.  Due to the absence of REM sleep severity may be underestimated.  Oxygen saturation remained above 89%.  PLM index increased to 69.6 with PLM arousal index of 4.1.  Snoring noted for 40.7% of " total sleep time.       Impression:  1. Obstructive sleep apnea    2. Super obese    3. Dry mouth          Plan:  Raise humidity to level 3 in view of dry mouth. Call if increase in condensation in the tubing.  Otherwise, Susanne is doing great on the DreamStation 2 machine. We will keep the pressures on the device the same.    I reviewed download report and original sleep study report with the patient. I advised pt to contact us if PAP pressures feel excessive or insufficient.    Weight loss will be strongly beneficial to reduce the severity of sleep-disordered breathing.  Caution during activities that require prolonged concentration is strongly advised if sleepiness returns.     Follow up with Dr. Roberts in one year    Thank you for allowing me to participate in your patient's care.      NAZIA Jett  [facility]  Phone: [phone]      Part of this note may be an electronic transcription/translation of spoken language to printed text using the Dragon Dictation System.

## 2024-10-16 NOTE — PROGRESS NOTES
CPAP changes made in Care  per NAZIA DOLAN:    Issued by Dunia Wilson  Issued on 10/16/2024  Information  Status SENT TO MODEM  Therapy mode AutoCPAP  Device model (found on bottom of device) DreamStation 2 Auto CPAP Advanced  Mode attribute A-Flex  Device settings  Min pressure   10  Max pressure   12  A-Flex setting   3  Auto off   Off  Auto on   On  Ramp+ time   30  Ramp+ start pressure   Off  Mask resistance   X1  Tubing type   15  Opti-Start   On  EZ-Start   Disabled  Patient controls access   On  Patient data access   On  Humidifier settings  Tube temperature   3  Humidifier   3

## 2024-11-20 ENCOUNTER — TRANSCRIBE ORDERS (OUTPATIENT)
Dept: MAMMOGRAPHY | Facility: HOSPITAL | Age: 69
End: 2024-11-20
Payer: MEDICARE

## 2024-11-20 DIAGNOSIS — Z12.31 ENCOUNTER FOR SCREENING MAMMOGRAM FOR MALIGNANT NEOPLASM OF BREAST: Primary | ICD-10-CM

## 2024-12-31 ENCOUNTER — TELEPHONE (OUTPATIENT)
Dept: GASTROENTEROLOGY | Facility: CLINIC | Age: 69
End: 2024-12-31
Payer: MEDICARE

## 2024-12-31 NOTE — TELEPHONE ENCOUNTER
Currently on Skyrizi  2024 denied PAPA from [facility]     LOV : 07/31/24  Labs 01/09/24- Due labs with upcoming appt   Will needed cost 2025 doses and discuss options reapplying for 2025 PAPA

## 2025-01-13 ENCOUNTER — TELEPHONE (OUTPATIENT)
Dept: GASTROENTEROLOGY | Facility: CLINIC | Age: 70
End: 2025-01-13

## 2025-01-13 ENCOUNTER — LAB (OUTPATIENT)
Dept: LAB | Facility: HOSPITAL | Age: 70
End: 2025-01-13
Payer: MEDICARE

## 2025-01-13 ENCOUNTER — OFFICE VISIT (OUTPATIENT)
Dept: GASTROENTEROLOGY | Facility: CLINIC | Age: 70
End: 2025-01-13
Payer: MEDICARE

## 2025-01-13 VITALS
SYSTOLIC BLOOD PRESSURE: 130 MMHG | WEIGHT: 293 LBS | DIASTOLIC BLOOD PRESSURE: 80 MMHG | BODY MASS INDEX: 53.92 KG/M2 | HEIGHT: 62 IN

## 2025-01-13 DIAGNOSIS — K50.00 CROHN'S DISEASE OF SMALL INTESTINE WITHOUT COMPLICATION: ICD-10-CM

## 2025-01-13 DIAGNOSIS — K50.00 CROHN'S DISEASE OF SMALL INTESTINE WITHOUT COMPLICATION: Primary | ICD-10-CM

## 2025-01-13 DIAGNOSIS — Z13.6 ENCOUNTER FOR SCREENING FOR CARDIOVASCULAR DISORDERS: ICD-10-CM

## 2025-01-13 PROCEDURE — 83520 IMMUNOASSAY QUANT NOS NONAB: CPT

## 2025-01-13 PROCEDURE — 3079F DIAST BP 80-89 MM HG: CPT

## 2025-01-13 PROCEDURE — 1160F RVW MEDS BY RX/DR IN RCRD: CPT

## 2025-01-13 PROCEDURE — 82397 CHEMILUMINESCENT ASSAY: CPT

## 2025-01-13 PROCEDURE — 1159F MED LIST DOCD IN RCRD: CPT

## 2025-01-13 PROCEDURE — 99214 OFFICE O/P EST MOD 30 MIN: CPT

## 2025-01-13 PROCEDURE — 36415 COLL VENOUS BLD VENIPUNCTURE: CPT

## 2025-01-13 PROCEDURE — 3075F SYST BP GE 130 - 139MM HG: CPT

## 2025-01-13 PROCEDURE — 86141 C-REACTIVE PROTEIN HS: CPT

## 2025-01-13 RX ORDER — RISANKIZUMAB-RZAA 360 MG/2.4
360 WEARABLE INJECTOR SUBCUTANEOUS
Qty: 2.4 ML | Refills: 6 | Status: SHIPPED | OUTPATIENT
Start: 2025-01-13

## 2025-01-13 RX ORDER — GABAPENTIN 300 MG/1
300 CAPSULE ORAL 3 TIMES DAILY
COMMUNITY
Start: 2024-11-06

## 2025-01-13 RX ORDER — ALENDRONATE SODIUM 70 MG/1
TABLET ORAL
COMMUNITY
Start: 2024-12-15

## 2025-01-13 NOTE — PROGRESS NOTES
PATIENT INFORMATION  Susanne Harper       - 1955    CHIEF COMPLAINT  Chief Complaint   Patient presents with    Crohn's Disease    Heartburn       HISTORY OF PRESENT ILLNESS  Here today for Crohns follow-up     Crohns ileitis diagnosed . Previously maintained on imuran, however 2 NMSC removed in a yr. Crohns monitr 41. Switched to skyrizi, up to date on biologic labs. Recently another NMSC removed. PCP labs coming up.      Compliant with skyrizi. Bowels moving 2-3 times a day, making it to bathroom ok, occasional BRB on toilet paper. No straining or urgency. Cramping maybe not even once a month. No fiber supplement.      Started Tirzepitide compound 2 mos ago and seems to be ok, rings are looser, not sure if appetite improved, denies side effects. Ge     3/29/2023 last colon incomplete due to looping and adhesions. Normal barium enema.     Last Dexa 5/3/2018 with osteopenia hip.     GERD 20 mg omeprazole BID. Well controlled.          REVIEWED PERTINENT RESULTS/ LABS  Lab Results   Component Value Date    CASEREPORT  2020     Surgical Pathology Report                         Case: [id number]                                  Authorizing Provider:  Shantal Greenwood MD         Collected:           2020 09:04 AM          Ordering Location[facility]   Received:            2020 10:28 AM                                 OR                                                                           Pathologist:           Ashok Garcia MD                                                         Specimens:   1) - Small Intestine, terminal ileum biopsy                                                         2) - Large Intestine, Right / Ascending Colon, right colon biopsy                                   3) - Large Intestine, Transverse Colon, transverse colon  biopsy                                    4) - Large Intestine, Left / Descending Colon, left colon biopsy                                     5) - Large Intestine, Rectum, rectal polyp x5                                                       6) - Large Intestine, Rectum, randum rectal biopsies                                       FINALDX  01/08/2020     1. Terminal Ileum, Biopsy: Benign small bowel mucosa with   A. Normal intact villous surface.   B. No significant inflammation, no granulomas.   C. No viral inclusions or other organisms on routinely stained sections.    2. Colon, Right, Biopsy: Benign colonic mucosa with   A. No hyperplastic or tubulovillous change.   B. No significant inflammation.   C. No crypt distortion or basement membrane thickening.   D. No viral inclusions or other organisms on routinely stained sections.    3. Colon, Transverse, Biopsy: Benign colonic mucosa with   A. No hyperplastic or tubulovillous change.   B. No significant inflammation.   C. No crypt distortion or basement membrane thickening.   D. No viral inclusions or other organisms on routinely stained sections.    4. Colon, Left, Biopsy: Benign colonic mucosa with   A. No hyperplastic or tubulovillous change.   B. No significant inflammation.   C. No crypt distortion or basement membrane thickening.   D. No viral inclusions or other organisms on routinely stained sections.    5. Rectum, Biopsy:    A. Hyperplastic polyps.    6. Rectum, Random, Biopsy: Benign colonic mucosa with   A. A single fragment suggestive of developing hyperplastic polyp.    jalil/pkm           Lab Results   Component Value Date    HGB 13.5 12/21/2023    MCV 94.8 12/21/2023     12/21/2023    ALT 16 07/18/2024    AST 25 07/18/2024    HGBA1C 5.80 (H) 04/12/2020    INR 1.04 04/13/2020    TRIG 126 07/08/2024      No results found.    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]     History of vaccination [Z92.29]     Body mass index (BMI) 45.0-49.9, adult [Z68.42]     Benign essential hypertension [I10]     Wheelchair dependence [Z99.3]     COVID-19 [U07.1]     Vitamin D deficiency [E55.9]     Hip pain [M25.559]     Partial obstruction of small intestine [K56.600]     Ventral hernia [K43.9]     Fracture of patella [S82.009A]     Edema of foot [R60.0]     Palpitations [R00.2]     Degenerative joint disease involving multiple joints [M15.9]     Knee pain [M25.569]     Hyperglycemia [R73.9]     Need for influenza vaccination [Z23]     Osteopenia [M85.80]     Postmenopausal state [Z78.0]     Vertigo [R42]     Encounter for screening mammogram for malignant neoplasm of breast [Z12.31]     Crohn's disease [K50.90]     Hyperlipidemia [E78.5]     Obstructive sleep apnea of adult [G47.33]     Hypothyroidism [E03.9]     History of gastroesophageal reflux (GERD) [Z87.19]     Glaucoma (increased eye pressure) [H40.9]     Depressive disorder [F32.A]     Arthritis [M19.90]     Allergic rhinitis [J30.9]     Shoulder joint pain [M25.519]     Sleep apnea [G47.30]     Incisional hernia [K43.2]     Gastroesophageal reflux disease [K21.9]     Hypertensive disorder [I10]          PAST MEDICAL HISTORY  Past Medical History:   Diagnosis Date    Allergic rhinitis     Anal lesion 06/2023    Anxiety     Aortic valve sclerosis 07/2021    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 CINDY ESCOBAR    COVID-19 09/15/2021    Crohn's disease     DDD (degenerative disc disease), lumbar     DDD (degenerative disc disease), thoracic     Degenerative joint disease involving multiple joints 03/06/2019    Depression     Generalized edema 06/2023    GERD  (gastroesophageal reflux disease)     GI (gastrointestinal bleed)     Glaucoma (increased eye pressure) 03/23/2017    Hernia 2015/2016    History of acute renal failure     after surgery    History of transfusion     Hyperglycemia 02/28/2019    Hyperlipidemia     Hypersomatotropism 07/2018    Hypertension     Hypothyroidism     Lumbar radiculopathy     Obstructive sleep apnea     Osteoarthritis     Osteopenia     Osteoporosis     Palpitations 03/06/2019    Partial obstruction of small intestine 04/20/2020    Partial small bowel obstruction 04/12/2020    ADMITTED TO [facility]    Peripheral edema 08/2021    PLMD (periodic limb movement disorder)     Rectal bleeding ?    Respiratory failure, post-operative     on vent x3 days 12/2015    Vertigo     Vitamin D deficiency 09/24/2020    Wheelchair dependence 06/23/2022         SURGICAL HISTORY  Past Surgical History:   Procedure Laterality Date    ABDOMINAL SURGERY  2015/2016    APPENDECTOMY  Same time hysterectomoy    COLONOSCOPY N/A 11/14/2014    MILD SIGMOID DIVERTICULOSIS, HEMORRHOIDS, RANDOM COLON BIOPSIES BENIGN, DR. LYNDSAY LARRY AT [facility]    COLONOSCOPY N/A 01/08/2020    5 BENIGN RECTAL POLYPS, DIVERTICULOSIS ON LEFT SIDE, RESCOPE IN 5 YRS, DR. LYNDSAY LARRY AT [facility]    COLONOSCOPY N/A 03/29/2023    EXCESSIVE LOOPING AND ADHESIONS, DR. CINDY ESCOBAR AT [facility]    COLONOSCOPY N/A 2007    EXPLORATORY LAPAROTOMY N/A 12/21/2015    AND PARTIAL OMENECTOMY, DR. ABE CASTILLO AT [facility]    GANGLION CYST EXCISION Right 1983    HYSTERECTOMY N/A 2007    SINUS ENDOSCOPY Bilateral 07/05/2023    (+) CARCINOMA, DR. LUPILLO ZIEGLER AT  OF     TONSILLECTOMY AND ADENOIDECTOMY Bilateral     UMBILICAL HERNIA REPAIR N/A 12/18/2015    LAPAROSCOPIC, DR. ABE CASTILLO AT [facility]    UPPER GASTROINTESTINAL ENDOSCOPY  ???    VENTRAL/INCISIONAL HERNIA REPAIR N/A 11/30/2016    Procedure: VENTRAL/INCISIONAL HERNIA REPAIR;  Surgeon: Sumit Logan MD;  Location: [facility];  Service:          FAMILY  HISTORY  Family History   Problem Relation Age of Onset    Arthritis Mother     Alcohol abuse Father     Congenital heart disease Sister     Diabetes Maternal Grandmother     Glaucoma Maternal Grandmother     Heart disease Maternal Grandfather     Cancer Maternal Grandfather     Heart disease Paternal Grandfather     Breast cancer Neg Hx     Colon cancer Neg Hx     Colon polyps Neg Hx          SOCIAL HISTORY  Social History     Occupational History    Not on file   Tobacco Use    Smoking status: Never     Passive exposure: Never    Smokeless tobacco: Never    Tobacco comments:     N/A   Vaping Use    Vaping status: Never Used   Substance and Sexual Activity    Alcohol use: Never    Drug use: Never    Sexual activity: Yes     Partners: Male     Birth control/protection: Hysterectomy     Comment: complete hysterectomy         CURRENT MEDICATIONS    Current Outpatient Medications:     alendronate (FOSAMAX) 70 MG tablet, Every 7 (Seven) Days., Disp: , Rfl:     B Complex Vitamins (VITAMIN B COMPLEX PO), Take  by mouth., Disp: , Rfl:     BACLOFEN 10 MG/5ML ORAL SUSPENSION, Take 5 mL by mouth., Disp: , Rfl:     Biotin 1 MG capsule, 5,000 mcg., Disp: , Rfl:     Black Pepper-Turmeric (Turmeric Curcumin) 5-1000 MG capsule, 2 (Two) Times a Day., Disp: , Rfl:     Calcium Carbonate-Vitamin D (CALTRATE 600+D PO), Take  by mouth Daily., Disp: , Rfl:     celecoxib (CeleBREX) 200 MG capsule, Take 1 capsule by mouth Daily., Disp: , Rfl:     fluticasone (FLONASE) 50 MCG/ACT nasal spray, Administer 2 sprays into the nostril(s) as directed by provider Daily., Disp: , Rfl:     gabapentin (NEURONTIN) 300 MG capsule, Take 1 capsule by mouth 3 (Three) Times a Day., Disp: , Rfl:     hydroCHLOROthiazide (HYDRODIURIL) 25 MG tablet, Take 1 tablet by mouth Daily., Disp: , Rfl:     latanoprost (XALATAN) 0.005 % ophthalmic solution, , Disp: , Rfl:     levocetirizine (XYZAL) 5 MG tablet, Take 1 tablet by mouth Every Evening., Disp: , Rfl:      "levothyroxine (SYNTHROID, LEVOTHROID) 50 MCG tablet, Take 1 tablet by mouth Daily., Disp: , Rfl:     lisinopril-hydrochlorothiazide (PRINZIDE,ZESTORETIC) 20-12.5 MG per tablet, Take 1 tablet by mouth 2 (Two) Times a Day Before Meals., Disp: , Rfl:     magnesium, as, gluconate (MAGONATE) 500 (27 Mg) MG tablet, Take 1 tablet by mouth 2 (Two) Times a Day., Disp: , Rfl:     montelukast (SINGULAIR) 10 MG tablet, , Disp: , Rfl:     omeprazole (PriLOSEC) 20 MG capsule, Take 1 capsule by mouth Daily., Disp: , Rfl:     potassium chloride (K-DUR,KLOR-CON) 20 MEQ tablet controlled-release ER tablet, Take 1 tablet by mouth Daily., Disp: , Rfl:     pravastatin (PRAVACHOL) 80 MG tablet, Take 1 tablet by mouth Daily., Disp: , Rfl:     Risankizumab-rzaa (Skyrizi) 360 MG/2.4ML solution cartridge, Inject 360 mg under the skin into the appropriate area as directed Every 8 (Eight) Weeks., Disp: 2.4 mL, Rfl: 6    sertraline (ZOLOFT) 50 MG tablet, Take 1 tablet by mouth every night at bedtime., Disp: , Rfl:     TIRZEPATIDE SC, 1 (One) Time Per Week., Disp: , Rfl:     vitamin C (ASCORBIC ACID) 250 MG tablet, Take 1 tablet by mouth Daily., Disp: , Rfl:     ALLERGIES  Codeine and Penicillins    VITALS  Vitals:    01/13/25 1436   BP: 130/80   BP Location: Left arm   Patient Position: Sitting   Cuff Size: Large Adult   Weight: (!) 158 kg (349 lb)   Height: 157.5 cm (62\")       PHYSICAL EXAM  Debilities/Disabilities Identified: None  Emotional Behavior: Appropriate  Wt Readings from Last 3 Encounters:   01/13/25 (!) 158 kg (349 lb)   10/16/24 (!) 158 kg (349 lb)   07/31/24 (!) 158 kg (349 lb)     Ht Readings from Last 1 Encounters:   01/13/25 157.5 cm (62\")     Body mass index is 63.83 kg/m².  Physical Exam  Constitutional:       General: She is not in acute distress.     Appearance: Normal appearance. She is not ill-appearing.   HENT:      Head: Normocephalic and atraumatic.      Mouth/Throat:      Mouth: Mucous membranes are moist.      " Pharynx: No posterior oropharyngeal erythema.   Eyes:      General: No scleral icterus.  Cardiovascular:      Rate and Rhythm: Normal rate and regular rhythm.      Heart sounds: Normal heart sounds.   Pulmonary:      Effort: Pulmonary effort is normal.      Breath sounds: Normal breath sounds.   Abdominal:      General: Abdomen is flat. Bowel sounds are normal. There is no distension.      Palpations: Abdomen is soft. There is no mass.      Tenderness: There is no abdominal tenderness. There is no guarding or rebound. Negative signs include Pardo's sign.      Hernia: No hernia is present.   Musculoskeletal:      Cervical back: Neck supple.   Skin:     General: Skin is warm.      Capillary Refill: Capillary refill takes less than 2 seconds.   Neurological:      General: No focal deficit present.      Mental Status: She is alert and oriented to person, place, and time.   Psychiatric:         Mood and Affect: Mood normal.         Behavior: Behavior normal.         Thought Content: Thought content normal.         Judgment: Judgment normal.         CLINICAL DATA REVIEWED   reviewed previous lab results and integrated with today's visit, reviewed notes from other physicians and/or last GI encounter, reviewed previous endoscopy results and available photos, reviewed surgical pathology results from previous biopsies    ASSESSMENT  Diagnoses and all orders for this visit:    Crohn's disease of small intestine without complication  -     Risankizumab-rzaa (Skyrizi) 360 MG/2.4ML solution cartridge; Inject 360 mg under the skin into the appropriate area as directed Every 8 (Eight) Weeks.  -     [employer] Monitr Crohn's Disease; Future    Encounter for screening for cardiovascular disorders  -     [facility] Monitr Crohn's Disease; Future    Other orders  -     TIRZEPATIDE SC; 1 (One) Time Per Week.  -     alendronate (FOSAMAX) 70 MG tablet; Every 7 (Seven) Days.  -     gabapentin (NEURONTIN) 300 MG capsule; Take 1 capsule by  mouth 3 (Three) Times a Day.          PLAN    Crohns: Continue skyrizi, crohns monitr today    Return in about 6 months (around 7/13/2025).    I have discussed the above plan with the patient.  They verbalize understanding and are in agreement with the plan.  They have been advised to contact the office for any questions, concerns, or changes related to their health.

## 2025-01-13 NOTE — TELEPHONE ENCOUNTER
Pt in office today  Send new PA through [employer] For [place] injector to [facility]- stating received paid claim.    Will contact [facility] for Auth for 2025  Need cost of medication if decided to pursue PAPA

## 2025-01-15 NOTE — TELEPHONE ENCOUNTER
PER CM:  Authorization already on file for this request. Authorization starting on 10/01/2024 and ending on 12/31/2025.

## 2025-01-27 LAB — REF LAB TEST METHOD: NORMAL

## 2025-01-29 ENCOUNTER — HOSPITAL ENCOUNTER (OUTPATIENT)
Dept: MAMMOGRAPHY | Facility: HOSPITAL | Age: 70
Discharge: HOME | End: 2025-01-29
Admitting: PHYSICIAN ASSISTANT
Payer: MEDICARE

## 2025-01-29 DIAGNOSIS — Z12.31 ENCOUNTER FOR SCREENING MAMMOGRAM FOR MALIGNANT NEOPLASM OF BREAST: ICD-10-CM

## 2025-01-29 PROCEDURE — 77067 SCR MAMMO BI INCL CAD: CPT | Performed by: RADIOLOGY

## 2025-01-29 PROCEDURE — 77063 BREAST TOMOSYNTHESIS BI: CPT

## 2025-01-29 PROCEDURE — 77067 SCR MAMMO BI INCL CAD: CPT

## 2025-01-29 PROCEDURE — 77063 BREAST TOMOSYNTHESIS BI: CPT | Performed by: RADIOLOGY

## 2025-02-28 ENCOUNTER — TELEPHONE (OUTPATIENT)
Dept: GASTROENTEROLOGY | Facility: CLINIC | Age: 70
End: 2025-02-28
Payer: MEDICARE

## 2025-02-28 DIAGNOSIS — Z11.59 ENCOUNTER FOR SCREENING FOR OTHER VIRAL DISEASES: ICD-10-CM

## 2025-02-28 DIAGNOSIS — K50.00 CROHN'S DISEASE OF SMALL INTESTINE WITHOUT COMPLICATION: Primary | ICD-10-CM

## 2025-03-06 ENCOUNTER — LAB (OUTPATIENT)
Dept: LAB | Facility: HOSPITAL | Age: 70
End: 2025-03-06
Payer: MEDICARE

## 2025-03-06 DIAGNOSIS — K50.00 CROHN'S DISEASE OF SMALL INTESTINE WITHOUT COMPLICATION: ICD-10-CM

## 2025-03-06 PROCEDURE — 86704 HEP B CORE ANTIBODY TOTAL: CPT

## 2025-03-06 PROCEDURE — 86480 TB TEST CELL IMMUN MEASURE: CPT

## 2025-03-07 LAB — HBV CORE AB SERPL QL IA: NEGATIVE

## 2025-03-08 LAB
GAMMA INTERFERON BACKGROUND BLD IA-ACNC: 0.01 IU/ML
M TB IFN-G BLD-IMP: NEGATIVE
M TB IFN-G CD4+ BCKGRND COR BLD-ACNC: 0.01 IU/ML
M TB IFN-G CD4+CD8+ BCKGRND COR BLD-ACNC: 0.01 IU/ML
MITOGEN IGNF BCKGRD COR BLD-ACNC: 3.65 IU/ML
QUANTIFERON INCUBATION: NORMAL
SERVICE CMNT-IMP: NORMAL

## 2025-03-10 ENCOUNTER — PATIENT MESSAGE (OUTPATIENT)
Dept: GASTROENTEROLOGY | Facility: CLINIC | Age: 70
End: 2025-03-10
Payer: MEDICARE

## 2025-07-02 ENCOUNTER — TELEPHONE (OUTPATIENT)
Dept: GASTROENTEROLOGY | Facility: CLINIC | Age: 70
End: 2025-07-02
Payer: MEDICARE

## 2025-07-02 NOTE — TELEPHONE ENCOUNTER
LOV 01/13/25  Labs Due March 2026    Original PA on file until 12.21.25   Denied PAPA in 2024- pt was to contact office if wished to reapply in 2025

## 2025-07-16 ENCOUNTER — OFFICE VISIT (OUTPATIENT)
Dept: GASTROENTEROLOGY | Facility: CLINIC | Age: 70
End: 2025-07-16
Payer: MEDICARE

## 2025-07-16 VITALS — DIASTOLIC BLOOD PRESSURE: 60 MMHG | SYSTOLIC BLOOD PRESSURE: 132 MMHG | BODY MASS INDEX: 63.82 KG/M2 | HEIGHT: 62 IN

## 2025-07-16 DIAGNOSIS — K50.00 CROHN'S DISEASE OF ILEUM WITHOUT COMPLICATION: Primary | ICD-10-CM

## 2025-07-16 RX ORDER — TIRZEPATIDE 7.5 MG/.5ML
7.5 INJECTION, SOLUTION SUBCUTANEOUS WEEKLY
COMMUNITY
Start: 2025-04-24

## 2025-07-16 NOTE — PROGRESS NOTES
PATIENT INFORMATION  Susanne Harper       - 1955    CHIEF COMPLAINT  Chief Complaint   Patient presents with    Diarrhea    GI Bleeding       HISTORY OF PRESENT ILLNESS    Here today for Crohns follow-up     Crohns ileitis diagnosed . Previously maintained on imuran, however 2 NMSC removed in a yr. Crohns monitr 41. Switched to skyrizi, up to date on biologic labs.      Compliant with skyrizi.Bowels are moving 1-3 times a day, soft, no cramping. Occasional bleeding she attributes to hemorrhoid, occasional blood on underwear without knowing. Maybe not even once a month, this is not new. 1 episode worried her. Not straining. Reviewed supportive care with patient including fiber and fluids, call back if persistent bleeding. Bleeding precedes last monitr.     On zepbound and not causing any issues other than itching at injection site.     3/29/2023 last colon incomplete due to looping and adhesions. Normal barium enema. Will plan for CT enterography  unless indicated sooner.     Last Dexa 5/3/2018 with osteopenia hip.     GERD 20 mg omeprazole BID. Well controlled.          REVIEWED PERTINENT RESULTS/ LABS  Lab Results   Component Value Date    CASEREPORT  2020     Surgical Pathology Report                         Case: [id number]                                  Authorizing Provider:  Shantal Greenwood MD         Collected:           2020 09:04 AM          Ordering Location:     [facility]   Received:            2020 10:28 AM                                 OR                                                                           Pathologist:           Ashok Garcia MD                                                         Specimens:   1) - Small Intestine, terminal ileum biopsy                                                         2) - Large Intestine, Right / Ascending Colon, right colon biopsy                                   3) - Large Intestine, Transverse  Colon, transverse colon  biopsy                                    4) - Large Intestine, Left / Descending Colon, left colon biopsy                                    5) - Large Intestine, Rectum, rectal polyp x5                                                       6) - Large Intestine, Rectum, randum rectal biopsies                                       FINALDX  01/08/2020     1. Terminal Ileum, Biopsy: Benign small bowel mucosa with   A. Normal intact villous surface.   B. No significant inflammation, no granulomas.   C. No viral inclusions or other organisms on routinely stained sections.    2. Colon, Right, Biopsy: Benign colonic mucosa with   A. No hyperplastic or tubulovillous change.   B. No significant inflammation.   C. No crypt distortion or basement membrane thickening.   D. No viral inclusions or other organisms on routinely stained sections.    3. Colon, Transverse, Biopsy: Benign colonic mucosa with   A. No hyperplastic or tubulovillous change.   B. No significant inflammation.   C. No crypt distortion or basement membrane thickening.   D. No viral inclusions or other organisms on routinely stained sections.    4. Colon, Left, Biopsy: Benign colonic mucosa with   A. No hyperplastic or tubulovillous change.   B. No significant inflammation.   C. No crypt distortion or basement membrane thickening.   D. No viral inclusions or other organisms on routinely stained sections.    5. Rectum, Biopsy:    A. Hyperplastic polyps.    6. Rectum, Random, Biopsy: Benign colonic mucosa with   A. A single fragment suggestive of developing hyperplastic polyp.    jalil/pkm           Lab Results   Component Value Date    HGB 13.5 12/21/2023    MCV 94.8 12/21/2023     12/21/2023    ALT 16 07/18/2024    AST 25 07/18/2024    HGBA1C 5.80 (H) 04/12/2020    INR 1.04 04/13/2020    TRIG 126 07/08/2024      No results found.    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]     History of vaccination [Z92.29]     Body mass index (BMI) 45.0-49.9, adult [Z68.42]     Benign essential hypertension [I10]     Wheelchair dependence [Z99.3]     COVID-19 [U07.1]     Vitamin D deficiency [E55.9]     Hip pain [M25.559]     Partial obstruction of small intestine [K56.600]     Ventral hernia [K43.9]     Fracture of patella [S82.009A]     Edema of foot [R60.0]     Palpitations [R00.2]     Degenerative joint disease involving multiple joints [M15.9]     Knee pain [M25.569]     Hyperglycemia [R73.9]     Need for influenza vaccination [Z23]     Osteopenia [M85.80]     Postmenopausal state [Z78.0]     Vertigo [R42]     Encounter for screening mammogram for malignant neoplasm of breast [Z12.31]     Crohn's disease [K50.90]     Hyperlipidemia [E78.5]     Obstructive sleep apnea of adult [G47.33]     Hypothyroidism [E03.9]     History of gastroesophageal reflux (GERD) [Z87.19]     Glaucoma (increased eye pressure) [H40.9]     Depressive disorder [F32.A]     Arthritis [M19.90]     Allergic rhinitis [J30.9]     Shoulder joint pain [M25.519]     Sleep apnea [G47.30]     Incisional hernia [K43.2]     Gastroesophageal reflux disease [K21.9]     Hypertensive disorder [I10]          PAST MEDICAL HISTORY  Past Medical History:   Diagnosis Date    Allergic rhinitis     Anal lesion 06/2023    Anxiety     Aortic valve sclerosis 07/2021    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 CINDY ESCOBAR    COVID-19 09/15/2021    Crohn's disease     DDD (degenerative disc disease), lumbar     DDD (degenerative disc  disease), thoracic     Degenerative joint disease involving multiple joints 03/06/2019    Depression     Ganglion cyst Around 1983    Generalized edema 06/2023    GERD (gastroesophageal reflux disease)     GI (gastrointestinal bleed)     Glaucoma (increased eye pressure) 03/23/2017    Hernia 2015/2016    History of acute renal failure     after surgery    History of transfusion 2015    Hyperglycemia 02/28/2019    Hyperlipidemia     Hypersomatotropism 07/2018    Hypertension     Hypothyroidism     Lumbar radiculopathy     Obstructive sleep apnea     Osteoarthritis 25-30 years ago    Osteopenia     Osteoporosis     Palpitations 03/06/2019    Partial obstruction of small intestine 04/20/2020    Partial small bowel obstruction 04/12/2020    ADMITTED TO [facility]    Peripheral edema 08/2021    PLMD (periodic limb movement disorder)     Rectal bleeding ?    Respiratory failure, post-operative     on vent x3 days 12/2015    Vertigo     Vitamin D deficiency 09/24/2020    Wheelchair dependence 06/23/2022         SURGICAL HISTORY  Past Surgical History:   Procedure Laterality Date    ABDOMINAL SURGERY  2015/2016    APPENDECTOMY  Same time hysterectomoy    COLONOSCOPY N/A 11/14/2014    MILD SIGMOID DIVERTICULOSIS, HEMORRHOIDS, RANDOM COLON BIOPSIES BENIGN, DR. LYNDSAY LARRY AT [facility]    COLONOSCOPY N/A 01/08/2020    5 BENIGN RECTAL POLYPS, DIVERTICULOSIS ON LEFT SIDE, RESCOPE IN 5 YRS, DR. LYNDSAY LARRY AT  [facility]    COLONOSCOPY N/A 03/29/2023    EXCESSIVE LOOPING AND ADHESIONS, DR. CINDY ESCOBAR AT [facility]    COLONOSCOPY N/A 2007    EXPLORATORY LAPAROTOMY N/A 12/21/2015    AND PARTIAL OMENECTOMY, DR. ABE CASTILLO AT [facility]    GANGLION CYST EXCISION Right 1983    HYSTERECTOMY N/A 2007    SINUS ENDOSCOPY Bilateral 07/05/2023    (+) CARCINOMA, DR. 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 FISH  JONATHAN AT [facility]    UPPER GASTROINTESTINAL ENDOSCOPY  ???    VENTRAL/INCISIONAL HERNIA REPAIR N/A 11/30/2016    Procedure: VENTRAL/INCISIONAL HERNIA REPAIR;  Surgeon: Sumit Logan MD;  Location: [facility] OR;  Service:          FAMILY HISTORY  Family History   Problem Relation Age of Onset    Arthritis Mother     Stroke Mother     Osteoporosis Mother     Alcohol abuse Father     Congenital heart disease Sister     Diabetes Maternal Grandmother     Glaucoma Maternal Grandmother     Heart disease Maternal Grandfather     Cancer Maternal Grandfather     Heart disease Paternal Grandfather     Diabetes Maternal Uncle     Diabetes Maternal Uncle     Diabetes Maternal Uncle     Breast cancer Neg Hx     Colon cancer Neg Hx     Colon polyps Neg Hx          SOCIAL HISTORY  Social History     Occupational History    Not on file   Tobacco Use    Smoking status: Never     Passive exposure: Never    Smokeless tobacco: Never    Tobacco comments:     N/A   Vaping Use    Vaping status: Never Used   Substance and Sexual Activity    Alcohol use: Never    Drug use: Never    Sexual activity: Yes     Partners: Male     Birth control/protection: Hysterectomy     Comment: complete hysterectomy         CURRENT MEDICATIONS    Current Outpatient Medications:     alendronate (FOSAMAX) 70 MG tablet, Every 7 (Seven) Days., Disp: , Rfl:     B Complex Vitamins (VITAMIN B COMPLEX PO), Take  by mouth., Disp: , Rfl:     BACLOFEN 10 MG/5ML ORAL SUSPENSION, Take 5 mL by mouth., Disp: , Rfl:     Biotin 1 MG capsule, 5,000 mcg., Disp: , Rfl:     Black Pepper-Turmeric (Turmeric Curcumin) 5-1000 MG capsule, 2 (Two) Times a Day., Disp: , Rfl:     Calcium Carbonate-Vitamin D (CALTRATE 600+D PO), Take  by mouth Daily., Disp: , Rfl:     celecoxib (CeleBREX) 200 MG capsule, Take 1 capsule by mouth Daily., Disp: , Rfl:     fluticasone (FLONASE) 50 MCG/ACT nasal spray, Administer 2 sprays into the nostril(s) as directed by provider Daily., Disp: , Rfl:      "gabapentin (NEURONTIN) 300 MG capsule, Take 1 capsule by mouth 3 (Three) Times a Day., Disp: , Rfl:     hydroCHLOROthiazide (HYDRODIURIL) 25 MG tablet, Take 1 tablet by mouth Daily., Disp: , Rfl:     latanoprost (XALATAN) 0.005 % ophthalmic solution, , Disp: , Rfl:     levocetirizine (XYZAL) 5 MG tablet, Take 1 tablet by mouth Every Evening., Disp: , Rfl:     levothyroxine (SYNTHROID, LEVOTHROID) 50 MCG tablet, Take 1 tablet by mouth Daily., Disp: , Rfl:     lisinopril-hydrochlorothiazide (PRINZIDE,ZESTORETIC) 20-12.5 MG per tablet, Take 1 tablet by mouth 2 (Two) Times a Day Before Meals., Disp: , Rfl:     magnesium, as, gluconate (MAGONATE) 500 (27 Mg) MG tablet, Take 1 tablet by mouth 2 (Two) Times a Day., Disp: , Rfl:     montelukast (SINGULAIR) 10 MG tablet, , Disp: , Rfl:     omeprazole (PriLOSEC) 20 MG capsule, Take 1 capsule by mouth Daily., Disp: , Rfl:     potassium chloride (K-DUR,KLOR-CON) 20 MEQ tablet controlled-release ER tablet, Take 1 tablet by mouth Daily., Disp: , Rfl:     pravastatin (PRAVACHOL) 80 MG tablet, Take 1 tablet by mouth Daily., Disp: , Rfl:     Risankizumab-rzaa (Skyrizi) 360 MG/2.4ML solution cartridge, Inject 360 mg under the skin into the appropriate area as directed Every 8 (Eight) Weeks., Disp: 2.4 mL, Rfl: 6    sertraline (ZOLOFT) 50 MG tablet, Take 1 tablet by mouth every night at bedtime., Disp: , Rfl:     vitamin C (ASCORBIC ACID) 250 MG tablet, Take 1 tablet by mouth Daily., Disp: , Rfl:     Zepbound 7.5 MG/0.5ML solution auto-injector, Inject 0.5 mL under the skin into the appropriate area as directed 1 (One) Time Per Week., Disp: , Rfl:     ALLERGIES  Codeine and Penicillins    VITALS  Vitals:    07/16/25 1426   BP: 132/60   BP Location: Left arm   Patient Position: Sitting   Cuff Size: Adult   Height: 157.5 cm (62.01\")       PHYSICAL EXAM  Debilities/Disabilities Identified: None  Emotional Behavior: Appropriate  Wt Readings from Last 3 Encounters:   01/13/25 (!) 158 kg " "(349 lb)   10/16/24 (!) 158 kg (349 lb)   07/31/24 (!) 158 kg (349 lb)     Ht Readings from Last 1 Encounters:   07/16/25 157.5 cm (62.01\")     Body mass index is 63.82 kg/m².  Physical Exam  Constitutional:       General: She is not in acute distress.     Appearance: Normal appearance. She is not ill-appearing.   HENT:      Head: Normocephalic and atraumatic.      Mouth/Throat:      Mouth: Mucous membranes are moist.      Pharynx: No posterior oropharyngeal erythema.   Eyes:      General: No scleral icterus.  Cardiovascular:      Rate and Rhythm: Normal rate and regular rhythm.      Heart sounds: Normal heart sounds.   Pulmonary:      Effort: Pulmonary effort is normal.      Breath sounds: Normal breath sounds.   Abdominal:      General: Abdomen is flat. Bowel sounds are normal. There is no distension.      Palpations: Abdomen is soft. There is no mass.      Tenderness: There is no abdominal tenderness. There is no guarding or rebound. Negative signs include Pardo's sign.      Hernia: No hernia is present.   Musculoskeletal:      Cervical back: Neck supple.   Skin:     General: Skin is warm.      Capillary Refill: Capillary refill takes less than 2 seconds.   Neurological:      General: No focal deficit present.      Mental Status: She is alert and oriented to person, place, and time.   Psychiatric:         Mood and Affect: Mood normal.         Behavior: Behavior normal.         Thought Content: Thought content normal.         Judgment: Judgment normal.         CLINICAL DATA REVIEWED   reviewed previous lab results and integrated with today's visit, reviewed notes from other physicians and/or last GI encounter, reviewed previous endoscopy results and available photos, reviewed surgical pathology results from previous biopsies    ASSESSMENT  Diagnoses and all orders for this visit:    Crohn's disease of ileum without complication    Other orders  -     Zepbound 7.5 MG/0.5ML solution auto-injector; Inject 0.5 mL " under the skin into the appropriate area as directed 1 (One) Time Per Week.          PLAN    Continue constantine, CT colonography 3/2026  Call with persistent rectal bleeding    Return in about 6 months (around 1/16/2026).    I have discussed the above plan with the patient.  They verbalize understanding and are in agreement with the plan.  They have been advised to contact the office for any questions, concerns, or changes related to their health.

## (undated) DEVICE — SUCTION CANISTER, 3000CC,SAFELINER: Brand: DEROYAL

## (undated) DEVICE — GOWN ISOL W/THUMB UNIV BLU BX/15

## (undated) DEVICE — GLV SURG SENSICARE PI MIC PF SZ7.5 LF STRL

## (undated) DEVICE — Device: Brand: DEFENDO AIR/WATER/SUCTION AND BIOPSY VALVE

## (undated) DEVICE — JACKT LAB F/R KNIT CUFF/COLR XLG BLU

## (undated) DEVICE — KT ORCA ORCAPOD DISP STRL

## (undated) DEVICE — MASK,FACE,FLUID RESIST,SHLD,EARLOOP: Brand: MEDLINE

## (undated) DEVICE — JACKT LAB KNIT COLR LG BLU

## (undated) DEVICE — HYBRID TUBING/CAP SET FOR OLYMPUS® SCOPES: Brand: ERBE

## (undated) DEVICE — SYR LL 3CC

## (undated) DEVICE — SOL IRR H2O BTL 1000ML STRL

## (undated) DEVICE — BW-412T DISP COMBO CLEANING BRUSH: Brand: SINGLE USE COMBINATION CLEANING BRUSH

## (undated) DEVICE — SPNG GZ WOVN 4X4IN 12PLY 10/BX STRL

## (undated) DEVICE — SAFELINER SUCTION CANISTER 1000CC: Brand: DEROYAL

## (undated) DEVICE — FRCP BX RADJAW4 NDL 2.8 240CM LG OG BX40

## (undated) DEVICE — Device

## (undated) DEVICE — ADAPT CLN BIOGUARD AIR/H2O DISP

## (undated) DEVICE — GLV SURG SENSICARE MICRO PF LF 6 STRL

## (undated) DEVICE — ENDOSCOPY PORT CONNECTOR FOR OLYMPUS® SCOPES: Brand: ERBE

## (undated) DEVICE — VIAL FORMALIN CAP 10P 40ML